# Patient Record
Sex: FEMALE | Race: BLACK OR AFRICAN AMERICAN | NOT HISPANIC OR LATINO | URBAN - METROPOLITAN AREA
[De-identification: names, ages, dates, MRNs, and addresses within clinical notes are randomized per-mention and may not be internally consistent; named-entity substitution may affect disease eponyms.]

---

## 2020-08-15 ENCOUNTER — EMERGENCY (EMERGENCY)
Facility: HOSPITAL | Age: 40
LOS: 1 days | Discharge: ANOTHER HOSPITAL | End: 2020-08-15
Attending: EMERGENCY MEDICINE | Admitting: EMERGENCY MEDICINE
Payer: MEDICARE

## 2020-08-15 VITALS
SYSTOLIC BLOOD PRESSURE: 113 MMHG | HEART RATE: 87 BPM | RESPIRATION RATE: 18 BRPM | DIASTOLIC BLOOD PRESSURE: 65 MMHG | OXYGEN SATURATION: 97 % | TEMPERATURE: 98 F

## 2020-08-15 DIAGNOSIS — R45.851 SUICIDAL IDEATIONS: ICD-10-CM

## 2020-08-15 DIAGNOSIS — F20.9 SCHIZOPHRENIA, UNSPECIFIED: ICD-10-CM

## 2020-08-15 DIAGNOSIS — Z20.828 CONTACT WITH AND (SUSPECTED) EXPOSURE TO OTHER VIRAL COMMUNICABLE DISEASES: ICD-10-CM

## 2020-08-15 DIAGNOSIS — Z88.2 ALLERGY STATUS TO SULFONAMIDES: ICD-10-CM

## 2020-08-15 LAB
ALBUMIN SERPL ELPH-MCNC: 4.3 G/DL — SIGNIFICANT CHANGE UP (ref 3.3–5)
ALP SERPL-CCNC: 63 U/L — SIGNIFICANT CHANGE UP (ref 40–120)
ALT FLD-CCNC: 12 U/L — SIGNIFICANT CHANGE UP (ref 10–45)
ANION GAP SERPL CALC-SCNC: 10 MMOL/L — SIGNIFICANT CHANGE UP (ref 5–17)
APAP SERPL-MCNC: <5 UG/ML — LOW (ref 10–30)
AST SERPL-CCNC: 15 U/L — SIGNIFICANT CHANGE UP (ref 10–40)
BASOPHILS # BLD AUTO: 0.02 K/UL — SIGNIFICANT CHANGE UP (ref 0–0.2)
BASOPHILS NFR BLD AUTO: 0.4 % — SIGNIFICANT CHANGE UP (ref 0–2)
BILIRUB SERPL-MCNC: 0.3 MG/DL — SIGNIFICANT CHANGE UP (ref 0.2–1.2)
BUN SERPL-MCNC: 10 MG/DL — SIGNIFICANT CHANGE UP (ref 7–23)
CALCIUM SERPL-MCNC: 9.5 MG/DL — SIGNIFICANT CHANGE UP (ref 8.4–10.5)
CHLORIDE SERPL-SCNC: 106 MMOL/L — SIGNIFICANT CHANGE UP (ref 96–108)
CO2 SERPL-SCNC: 25 MMOL/L — SIGNIFICANT CHANGE UP (ref 22–31)
CREAT SERPL-MCNC: 0.9 MG/DL — SIGNIFICANT CHANGE UP (ref 0.5–1.3)
EOSINOPHIL # BLD AUTO: 0.24 K/UL — SIGNIFICANT CHANGE UP (ref 0–0.5)
EOSINOPHIL NFR BLD AUTO: 4.3 % — SIGNIFICANT CHANGE UP (ref 0–6)
ETHANOL SERPL-MCNC: 79 MG/DL — HIGH (ref 0–10)
GLUCOSE SERPL-MCNC: 80 MG/DL — SIGNIFICANT CHANGE UP (ref 70–99)
HCT VFR BLD CALC: 34 % — LOW (ref 34.5–45)
HGB BLD-MCNC: 11.1 G/DL — LOW (ref 11.5–15.5)
IMM GRANULOCYTES NFR BLD AUTO: 0.2 % — SIGNIFICANT CHANGE UP (ref 0–1.5)
LYMPHOCYTES # BLD AUTO: 2.59 K/UL — SIGNIFICANT CHANGE UP (ref 1–3.3)
LYMPHOCYTES # BLD AUTO: 46.3 % — HIGH (ref 13–44)
MCHC RBC-ENTMCNC: 29.8 PG — SIGNIFICANT CHANGE UP (ref 27–34)
MCHC RBC-ENTMCNC: 32.6 GM/DL — SIGNIFICANT CHANGE UP (ref 32–36)
MCV RBC AUTO: 91.2 FL — SIGNIFICANT CHANGE UP (ref 80–100)
MONOCYTES # BLD AUTO: 0.46 K/UL — SIGNIFICANT CHANGE UP (ref 0–0.9)
MONOCYTES NFR BLD AUTO: 8.2 % — SIGNIFICANT CHANGE UP (ref 2–14)
NEUTROPHILS # BLD AUTO: 2.27 K/UL — SIGNIFICANT CHANGE UP (ref 1.8–7.4)
NEUTROPHILS NFR BLD AUTO: 40.6 % — LOW (ref 43–77)
NRBC # BLD: 0 /100 WBCS — SIGNIFICANT CHANGE UP (ref 0–0)
PLATELET # BLD AUTO: 254 K/UL — SIGNIFICANT CHANGE UP (ref 150–400)
POTASSIUM SERPL-MCNC: 3.6 MMOL/L — SIGNIFICANT CHANGE UP (ref 3.5–5.3)
POTASSIUM SERPL-SCNC: 3.6 MMOL/L — SIGNIFICANT CHANGE UP (ref 3.5–5.3)
PROT SERPL-MCNC: 7.4 G/DL — SIGNIFICANT CHANGE UP (ref 6–8.3)
RBC # BLD: 3.73 M/UL — LOW (ref 3.8–5.2)
RBC # FLD: 14.1 % — SIGNIFICANT CHANGE UP (ref 10.3–14.5)
SALICYLATES SERPL-MCNC: <0.3 MG/DL — LOW (ref 2.8–20)
SODIUM SERPL-SCNC: 141 MMOL/L — SIGNIFICANT CHANGE UP (ref 135–145)
WBC # BLD: 5.59 K/UL — SIGNIFICANT CHANGE UP (ref 3.8–10.5)
WBC # FLD AUTO: 5.59 K/UL — SIGNIFICANT CHANGE UP (ref 3.8–10.5)

## 2020-08-15 PROCEDURE — 85025 COMPLETE CBC W/AUTO DIFF WBC: CPT

## 2020-08-15 PROCEDURE — 93010 ELECTROCARDIOGRAM REPORT: CPT

## 2020-08-15 PROCEDURE — 80307 DRUG TEST PRSMV CHEM ANLYZR: CPT

## 2020-08-15 PROCEDURE — 93005 ELECTROCARDIOGRAM TRACING: CPT

## 2020-08-15 PROCEDURE — 99285 EMERGENCY DEPT VISIT HI MDM: CPT | Mod: 25

## 2020-08-15 PROCEDURE — 81003 URINALYSIS AUTO W/O SCOPE: CPT

## 2020-08-15 PROCEDURE — 87635 SARS-COV-2 COVID-19 AMP PRB: CPT

## 2020-08-15 PROCEDURE — 99285 EMERGENCY DEPT VISIT HI MDM: CPT

## 2020-08-15 PROCEDURE — 81025 URINE PREGNANCY TEST: CPT

## 2020-08-15 PROCEDURE — 36415 COLL VENOUS BLD VENIPUNCTURE: CPT

## 2020-08-15 PROCEDURE — 80053 COMPREHEN METABOLIC PANEL: CPT

## 2020-08-15 NOTE — ED PROVIDER NOTE - PROGRESS NOTE DETAILS
Director - pt received from Dr Suazo.  PARAM Walton continuing to follow.  Pt sleeping comfortably.  VS, labs reviewed.  Pt medically clear.  Pt eval by psycho who want to reassess in am.  Plan for signout to am team pending repeat psych eval. Director - Pt signed out to Dr Macias and PARAM Watts. Ree: pt received from night team at s/o; pt p/w depression/ suicidality, seen by telepsych and to be re-assessed by day time. Dispo pending. Will continue on 1:1 hold. MIGUEL: pt is awake alert and oriented, states that she is sad, and asking for help. no other current complaints. spoke with psychiatry, who states they are obtaining collateral information and will come to see patient in ed. disposition pending MIGUEL: pt is awake alert and oriented, states that she is sad, and asking for help. no other current complaints. spoke with psychiatry, who states they are obtaining collateral information and will come to see patient in ed. ua obtained, no infection. tox pending. disposition pending psych re-evaluation and reccs plan for voluntary admission. social work as well as case management involved. bed search began. disposition pending spoke with case management. bed search continued-- awaiting placement

## 2020-08-15 NOTE — ED PROVIDER NOTE - ATTENDING CONTRIBUTION TO CARE
The pt is a 39 y/o F, hx of schizophrenia, who presents to ED c/o feeling very depressed and SI over past mon. Pt states that has not been taking any psych meds. States "i'm just so sad", +SI - refusing to disclose plan, states that had 2 glasses wine tonight. Denies cp, sob, n/v/d, abd pain, HI, AH, VH    Pt comfortable not agitated   No focal physical findings  ETOH 79  COVID Neg    Telepsych eval pt and states they would like to reevaluate patient in morning  Signed out to Dr. Morris pending dispo and reevalution

## 2020-08-15 NOTE — ED PROVIDER NOTE - CLINICAL SUMMARY MEDICAL DECISION MAKING FREE TEXT BOX
hx of schizophrenia, not compliant w/meds, c/o worsening depression and si, medically cleared, psych consulted but state that needs an overnight hold and to be reassessed in am

## 2020-08-15 NOTE — ED PROVIDER NOTE - OBJECTIVE STATEMENT
The pt is a 41 y/o F, hx of schizophrenia, who presents to ED c/o feeling very depressed and SI over past mon. The pt is a 41 y/o F, hx of schizophrenia, who presents to ED c/o feeling very depressed and SI over past mon. Pt states that has not been taking any psych meds. States "i'm just so sad", +SI - refusing to disclose plan, states that had 2 glasses wine tonight. Denies cp, sob, n/v/d, abd pain, HI, AH, VH

## 2020-08-15 NOTE — ED ADULT NURSE NOTE - ORIENTED TO SITUATION
[place] INPATIENT ENCOUNTER  CRITICAL CARE DAILY PROGRESS NOTE    ADMISSION DATE:  12/18/2018  DATE:  12/28/2018  CURRENT HOSPITAL DAY:  Hospital Day: 11  ATTENDING PHYSICIAN:  Anthony Juárez MD  CODE STATUS:  No Resuscitation with Maximal Medical Support      IMPRESSION:     Acute on chronic hypoxic and hypercapnic respiratory failure  Altered mental status / possible narcotic induced  Acute on chronic renal failure  Hypotension, post intubation  decompensated diastolic heart failure / small pericardial effusion  Atrial fibrillation  Morbid obesity / Obesity hypoventilation syndrome           PLAN:     Continue vent support  Patient gets apneic on CPAP trials  Keep sedation off  dialysis  Repeat labs   Rate control  Blood sugar control      Patient remain critically ill    35 minutes of critical care time spent        SUBJECTIVE:  No events over night     MEDICATIONS:  SCHEDULED MEDS:  • piperacillin-tazobactam (ZOSYN) extended interval IVPB  3.375 g Intravenous 2 times per day   • chlorhexidine gluconate  15 mL Swish & Spit 2 times per day   • petrolatum(white)/mineral oil/NaCL  1 application Both Eyes 6 times per day   • pantoprazole  40 mg Intravenous Nightly   • epoetin sweetie  20,000 Units Subcutaneous Once per day on Wed   • ferrous sulfate  325 mg Oral Daily with breakfast   • insulin glargine  25 Units Subcutaneous 2 times per day   • pravastatin  80 mg Oral Nightly   • sodium chloride (PF)  2 mL Injection 2 times per day   • insulin lispro   Subcutaneous TID AC       CONTINUOUS INFUSIONS:  • propofol (DIPRIVAN) infusion 10 mcg/kg/min (12/28/18 0517)   • fentaNYL 1000 mcg in sodium chloride 0.9% 100 mL infusion premix 25 mcg/hr (12/28/18 0522)   • norepinephrine (LEVOPHED) 4 mg in sodium chloride 0.9% 250 mL infusion Stopped (12/27/18 0047)   • dextrose 5 % infusion         PRN MEDS:  sodium chloride, albumin human (SPA), sodium chloride, albumin human (SPA), naLOXone, chlorhexidine gluconate **AND**  chlorhexidine gluconate, fentaNYL, fentaNYL, propofol (DIPRIVAN) infusion **AND** Triglyceride, fentaNYL (SUBLIMAZE) standard infusion **AND** fentaNYL, ondansetron, metoCLOPramide, aluminum-magnesium hydroxide-simethicone, albuterol, sodium chloride, sodium chloride, magnesium sulfate, magnesium sulfate, magnesium sulfate, potassium chloride, potassium chloride, potassium chloride 20 mEq/100mL IVPB, potassium chloride, potassium chloride, potassium chloride 20 mEq/100mL IVPB, acetaminophen, polyethylene glycol, labetalol, dextrose, dextrose, glucagon, dextrose, sodium chloride (PF), guaiFENesin-DM)    HISTORIES:  I have reviewed the past medical history, family history, social history, medications and allergies listed in the medical record as well as the nursing notes for this encounter.    OBJECTIVE:  VITAL SIGNS:  Vital Last Value 24 Hour Range   Temperature 97.7 °F (36.5 °C) (12/28/18 0800) Temp  Min: 97.3 °F (36.3 °C)  Max: 98.9 °F (37.2 °C)   Pulse 121 (12/28/18 1000) Pulse  Min: 96  Max: 122   Respiratory 24 (12/27/18 1630) Resp  Min: 12  Max: 24   Non-Invasive  Blood Pressure 136/71 (12/28/18 1000) BP  Min: 88/62  Max: 156/86   Pulse Oximetry 99 % (12/28/18 1000) SpO2  Min: 96 %  Max: 100 %     Vital Today Admitted   Weight (!) 181.4 kg (12/27/18 1629) Weight: (!) 176.4 kg (12/18/18 1136)   Height N/A Height: 5' 7\" (170.2 cm) (12/18/18 1136)   BMI N/A BMI (Calculated): 60.91 (12/18/18 1136)     INTAKE/OUTPUT LAST 3 SHIFTS:  I/O last 3 completed shifts:  In: 687.3 [I.V.:581.7; IV Piggyback:105.6]  Out: 2650 [Urine:800; Drains:150; Other:1700]    INTAKE/OUTPUT THIS SHIFT:  No intake/output data recorded.    VENT SETTINGS LAST 24 HOURS:  FiO2 (%):  [30 %-35 %] 30 %  S RR:  [24] 24  S VT:  [480 mL] 480 mL  PEEP:  [5 cm H20] 5 cm H20    HEMODYNAMIC SETTINGS LAST 24 HOURS:       PHYSICAL EXAM:  General:  Lying in bed not in acute distress  Skin:  Skin color, texture, and turgor normal.  No rashes or  lesions.  Head:  Normocephalic, without obvious abnormality, atraumatic.  Eyes:  PERRL, conjunctivae/corneas clear.  Nose: Nares normal. Septum midline.  Throat:  Lips, mucosa, and tongue normal; teeth and gums normal.  Neck: Supple, symmetrical.  Trachea midline. No adenopathy.  Lungs:  Diminished,ed no crackles, rhonchi or wheezing   Heart:: IRRegular rate and rhythm. S1 and S2 normal. No murmur, rub or gallop.  Abdomen:  Soft, nontender.  Bowel sounds active in all four quadrants. No masses hard to assess for hepatosplenomegaly  Extremities: venous stasis changes, chronic lymphedema   Neurologic:  Sedated doesn't follow commands     LABORATORY DATA:  Recent Labs   Lab 12/28/18  0502 12/27/18  0438 12/26/18  1530   WBC 11.0 9.6 11.3*   HCT 31.1* 30.4* 36.6   HGB 9.0* 8.8* 9.8*   * 126* 142     Recent Labs   Lab 12/28/18  0502 12/27/18  0438 12/26/18  1530   SODIUM 136 136 136   POTASSIUM 3.9 3.8 4.8   CHLORIDE 100 99 99   CO2 29 28 29   ANIONGAP 11 13 13   GLUCOSE 125* 164* 218*   BUN 77* 98* 124*   CREATININE 3.03* 3.37* 4.23*   GFRA 18 16 12   GFRNA 16 14 10   BCRAT 25 29* 29*   CALCIUM 8.1* 7.9* 7.3*       IMAGING STUDIES:  Results for orders placed during the hospital encounter of 12/18/18   XR CHEST AP OR PA    Narrative PROCEDURE: CHEST, 1 VIEW, FRONTAL     COMPARISONS: Study or today    CLINICAL INDICATION: line placement    FINDINGS: Patient is intubated with endotracheal tube tip positioned 4 cm  above the cheryl. Nasogastric tube is directed into the stomach.  Right-sided dialysis catheter remains in place. There is been interval  placement of a left internal jugular central venous catheter with tube tip  in the right atrium. No pneumothorax.    Lung base opacity and right greater than left pleural effusions persist.  Stable heart size.           Impression IMPRESSION: Tubes in expected position. Persistent pleural effusions and  lung base opacity.             Thank you for letting me see Ms.  Galindo.      Jeyson Edwards M.D, [facility],      Yes

## 2020-08-16 ENCOUNTER — INPATIENT (INPATIENT)
Facility: HOSPITAL | Age: 40
LOS: 4 days | Discharge: HOME | End: 2020-08-21
Attending: PSYCHIATRY & NEUROLOGY | Admitting: PSYCHIATRY & NEUROLOGY
Payer: MEDICARE

## 2020-08-16 VITALS
HEART RATE: 66 BPM | DIASTOLIC BLOOD PRESSURE: 72 MMHG | WEIGHT: 128.97 LBS | SYSTOLIC BLOOD PRESSURE: 113 MMHG | TEMPERATURE: 97 F | HEIGHT: 68 IN | RESPIRATION RATE: 18 BRPM

## 2020-08-16 VITALS
TEMPERATURE: 99 F | HEART RATE: 61 BPM | DIASTOLIC BLOOD PRESSURE: 72 MMHG | RESPIRATION RATE: 18 BRPM | SYSTOLIC BLOOD PRESSURE: 131 MMHG | OXYGEN SATURATION: 100 %

## 2020-08-16 DIAGNOSIS — F32.9 MAJOR DEPRESSIVE DISORDER, SINGLE EPISODE, UNSPECIFIED: ICD-10-CM

## 2020-08-16 DIAGNOSIS — F20.9 SCHIZOPHRENIA, UNSPECIFIED: ICD-10-CM

## 2020-08-16 LAB
APPEARANCE UR: CLEAR — SIGNIFICANT CHANGE UP
BILIRUB UR-MCNC: NEGATIVE — SIGNIFICANT CHANGE UP
COLOR SPEC: YELLOW — SIGNIFICANT CHANGE UP
DIFF PNL FLD: NEGATIVE — SIGNIFICANT CHANGE UP
GLUCOSE UR QL: NEGATIVE — SIGNIFICANT CHANGE UP
HCG UR QL: NEGATIVE — SIGNIFICANT CHANGE UP
KETONES UR-MCNC: NEGATIVE — SIGNIFICANT CHANGE UP
LEUKOCYTE ESTERASE UR-ACNC: NEGATIVE — SIGNIFICANT CHANGE UP
NITRITE UR-MCNC: NEGATIVE — SIGNIFICANT CHANGE UP
PCP SPEC-MCNC: SIGNIFICANT CHANGE UP
PH UR: 6 — SIGNIFICANT CHANGE UP (ref 5–8)
PROT UR-MCNC: NEGATIVE MG/DL — SIGNIFICANT CHANGE UP
SARS-COV-2 RNA SPEC QL NAA+PROBE: SIGNIFICANT CHANGE UP
SP GR SPEC: 1.02 — SIGNIFICANT CHANGE UP (ref 1–1.03)
UROBILINOGEN FLD QL: 1 E.U./DL — SIGNIFICANT CHANGE UP

## 2020-08-16 PROCEDURE — 90792 PSYCH DIAG EVAL W/MED SRVCS: CPT | Mod: 95

## 2020-08-16 RX ORDER — HYDROXYZINE HCL 10 MG
25 TABLET ORAL EVERY 6 HOURS
Refills: 0 | Status: DISCONTINUED | OUTPATIENT
Start: 2020-08-16 | End: 2020-08-17

## 2020-08-16 NOTE — ED BEHAVIORAL HEALTH ASSESSMENT NOTE - OTHER PAST PSYCHIATRIC HISTORY (INCLUDE DETAILS REGARDING ONSET, COURSE OF ILLNESS, INPATIENT/OUTPATIENT TREATMENT)
per chart review pt has had prior psychiatric hospitalizations including to [facility], though details are unclear

## 2020-08-16 NOTE — ED BEHAVIORAL HEALTH ASSESSMENT NOTE - DESCRIPTION
Per ED staff pt has been quiet, requesting food, and sleeping, not requiring any PRN medication    Vital Signs Last 24 Hrs  T(C): 36.8 (16 Aug 2020 00:01), Max: 36.8 (16 Aug 2020 00:01)  T(F): 98.3 (16 Aug 2020 00:01), Max: 98.3 (16 Aug 2020 00:01)  HR: 63 (16 Aug 2020 00:01) (63 - 87)  BP: 124/81 (16 Aug 2020 00:01) (113/65 - 124/81)  BP(mean): --  RR: 18 (16 Aug 2020 00:01) (18 - 18)  SpO2: 98% (16 Aug 2020 00:01) (97% - 98%) denies reports living alone in an apartment

## 2020-08-16 NOTE — PATIENT PROFILE BEHAVIORAL HEALTH - NSBHINSIGHT_PSY_A_CORE
Notification Instructions: Patient will be notified of biopsy results. However, patient instructed to call the office if not contacted within 2 weeks. Detail Level: Detailed Bill 68251 For Specimen Handling/Conveyance To Laboratory?: no Size Of Lesion In Cm (Required): 1.3 Hemostasis: Drysol Path Notes (To The Dermatopathologist): Please check margins. Consent was obtained from the patient. The risks and benefits to therapy were discussed in detail. Specifically, the risks of infection, scarring, bleeding, prolonged wound healing, incomplete removal, allergy to anesthesia, nerve injury and recurrence were addressed. Prior to the procedure, the treatment site was clearly identified and confirmed by the patient. All components of Universal Protocol/PAUSE Rule completed. Billing Type: Third-Party Bill X Size Of Lesion In Cm (Optional): 0 Anesthesia Volume In Cc: 0.5 Lab: 6 Medical Necessity Information: It is in your best interest to select a reason for this procedure from the list below. All of these items fulfill various CMS LCD requirements except the new and changing color options. Was A Bandage Applied: Yes Wound Care: Petrolatum Anesthesia Type: 1% lidocaine with epinephrine Lab Facility: 3 Biopsy Method: Dermablade Post-Care Instructions: I reviewed with the patient in detail post-care instructions. Patient is to keep the biopsy site dry overnight, and then apply bacitracin twice daily until healed. Patient may apply hydrogen peroxide soaks to remove any crusting. Medical Necessity Clause: This procedure was medically necessary because the lesion that was treated was: fair

## 2020-08-16 NOTE — H&P ADULT - NSHPLABSRESULTS_GEN_ALL_CORE
11.1   5.59  )-----------( 254      ( 15 Aug 2020 22:22 )             34.0       08-15    141  |  106  |  10  ----------------------------<  80  3.6   |  25  |  0.90    Ca    9.5      15 Aug 2020 22:22    TPro  7.4  /  Alb  4.3  /  TBili  0.3  /  DBili  x   /  AST  15  /  ALT  12  /  AlkPhos  63  08-15              Urinalysis Basic - ( 16 Aug 2020 10:09 )    Color: Yellow / Appearance: Clear / S.025 / pH: x  Gluc: x / Ketone: NEGATIVE  / Bili: Negative / Urobili: 1.0 E.U./dL   Blood: x / Protein: NEGATIVE mg/dL / Nitrite: NEGATIVE   Leuk Esterase: NEGATIVE / RBC: x / WBC x   Sq Epi: x / Non Sq Epi: x / Bacteria: x            Lactate Trend            CAPILLARY BLOOD GLUCOSE

## 2020-08-16 NOTE — H&P ADULT - REASON FOR ADMISSION
40 YEARS OLD FEMALE TRANSFERRED FROM [facility] TO ADMIT TO [facility] UN[facility] FOR SUICIDAL THOUGHTS .

## 2020-08-16 NOTE — ED BEHAVIORAL HEALTH ASSESSMENT NOTE - THOUGHT CONTENT
Reason for Disposition   Passing pure blood or large blood clots (i e , size > a dime) (Exception: marco or small strands)    Answer Assessment - Initial Assessment Questions  1  COLOR of URINE: "Describe the color of the urine "  (e g , tea-colored, pink, red, blood clots, bloody)      Patient has urinated pure colored blood twice since yesterday night  2  ONSET: "When did the bleeding start?"       Noticed last night at around 2100 and this morning at 1130     3  EPISODES: "How many times has there been blood in the urine?" or "How many times today?"      Twice so far since last night  4  PAIN with URINATION: "Is there any pain with passing your urine?" If so, ask: "How bad is the pain?"  (Scale 1-10; or mild, moderate, severe)         Denies pain  5  FEVER: "Do you have a fever?" If so, ask: "What is your temperature, how was it measured, and when did it start?"      97 4 (oral)     6  ASSOCIATED SYMPTOMS: "Are you passing urine more frequently than usual?"     Denies frequency  Denies flank pain  No fever  7  OTHER SYMPTOMS: "Do you have any other symptoms?" (e g , back/flank pain, abdominal pain, vomiting)     No other symptoms  Is not on any blood thinners      Protocols used: URINE - BLOOD IN-ADULT- Other

## 2020-08-16 NOTE — ED BEHAVIORAL HEALTH ASSESSMENT NOTE - HPI (INCLUDE ILLNESS QUALITY, SEVERITY, DURATION, TIMING, CONTEXT, MODIFYING FACTORS, ASSOCIATED SIGNS AND SYMPTOMS)
Pt is a 41 y/o AAF, domiciled, reported psychiatric hx of schizophrenia, substance misuse, no significant pmh, prior psychiatric hospitalizations including state hospitalization (per chart review), unclear hx of prior suicidality, non-adherent to outpatient care and medication, presenting self to ED with suicidal ideations.    Per ED staff pt was vague but reported SI without reporting plan. She was cooperative with staff and requesting food. She had reported drinking 2 glasses of wine and had a BAL of 79 upon arrival, UDS pending.    Upon evaluation, pt was minimally cooperative and was falling asleep throughout interview, became more irritable when aroused or woken up. She would only say that she took the train to the hospital because she was feeling 'sad' and 'needs to be in psych'. She continued to fall asleep and when woken up would only repeat those statements, not answering questions about suicidality or plan, any current or active stressors, compliance with treatment or medication, or any psychotic sxs. She may be under the influence of drugs and or alcohol at this time contributing to her presentation, but would not address these questions.  Pt had no available collaterals, and most of her exam at this point is limited due to her lack of ability to cooperate at this time, requiring further assessment.

## 2020-08-16 NOTE — ED BEHAVIORAL HEALTH NOTE - BEHAVIORAL HEALTH NOTE
===================  PRE-HOSPITAL COURSE  ===================  SOURCE:  ED triage note and ED RN  DETAILS:  Patient walked into the ED stating she was feeling sad, depressed and suicidal    ============  ED COURSE   ============  SOURCE:  ED Triage note and ED RN   ARRIVAL:  Walk in  BELONGINGS:  All belongings were searched and secured.   BEHAVIOR: Per ED RN patient was cooperative during the triage process and has been calm and cooperative during her stay in the ED. Per ED RN patient has been quiet and sleeping. When patient was awake she told the RN she was depressed and had thoughts of hurting herself by overdosing on pills. Per ED RN patients speech was low, no eye contact, no a/v/h, does not look disheveled.   TREATMENT:  She did not receive any medications   VISITORS:  None    ========================  FOR EACH COLLATERAL  ========================  NAME: Paula  NUMBER: [phone]  RELATIONSHIP: Mother  COMMENTS: [facility] left message for mother on 8/15/2020 at 23:53        COVID Exposure Screen- collateral (i.e. third-party, chart review, belongings, etc; include EMS and ED staff)    1.            *In the past 14 days, has the patient been around anyone with a positive COVID-19 test?*   (  ) Yes   (  ) No   (  x) Unknown- Reason (e.g. collateral uncertain, refusing to answer, etc.):  ______  IF YES PROCEED TO QUESTION #2. IF NO or UNKNOWN, PLEASE SKIP TO QUESTION #7  2.            Was the patient within 6 feet of them for at least 15 minutes? (  ) Yes   (  ) No   (  ) Unknown- Reason: ______    3.            Did the patient provide care for them? (  ) Yes   (  ) No   (  ) Unknown- Reason: ______    4.            Did the patient have direct physical contact with them (touched, hugged, or kissed them)? (  ) Yes   (  ) No    (  ) Unknown- Reason: ______    5.            Did the patient share eating or drinking utensils with them? (  ) Yes   (  ) No    (  ) Unknown- Reason: ______    6.            Have they sneezed, coughed, or somehow got respiratory droplets on the patient? (  ) Yes   (  ) No    (  ) Unknown- Reason: ______    7.            *Has the patient been out of [place] within the past 14 days?*  (  ) Yes   (  ) No   ( x ) Unknown- Reason (e.g. collateral uncertain, refusing to answer, etc.): _______  IF YES PLEASE ANSWER THE FOLLOWING QUESTIONS:  8.            Which state/country has the patient been to? ______   9.            Was the patient there over 24 hours? (  ) Yes   (  ) No    (  ) Unknown- Reason: ______    10.          What date did the patient return to [facility]? ______

## 2020-08-16 NOTE — H&P ADULT - HISTORY OF PRESENT ILLNESS
41 y/o F, hx of schizophrenia, who presents to ED c/o feeling very depressed and SI over past mon. Pt states that has not been taking any psych meds. States "i'm just so sad", +SI - refusing to disclose plan, states that had 2 glasses wine tonight. Denies cp, sob, n/v/d, abd pain, HI, AH, VH

## 2020-08-16 NOTE — ED BEHAVIORAL HEALTH NOTE - BEHAVIORAL HEALTH NOTE
Patient ID  Patient is a 39 yo woman, domiciled with mother in apartment, unemployed, PPH of schizoaffective disorder, polysubstance use (alcohol, crack cocaine), multiple prior hospitalizations, most recently discharged from [facility] on 7/31/20 after admission for low mood, reported history of Invega IM and AOT, previous suicide attempts, with remote PMH of seizure disorder per chart, who walked in reporting sadness.     The patient was uncooperative with initial assessment overnight, and continued to fall asleep during interview. BAL was 79 on arrival.        Interval Events/HPI  Upon evaluation today, the patient remains sleepy but overall cooperative with questions. She demonstrates an odd affect, smiling and laughing inappropriately. Her thought process however is overall linear and there appears to be no evidence of distraction or internal preoccupation. She is guarded and not forthcoming throughout the interview. She says “I feel sad” and reports vague SI with plan to overdose “on pills”. She denies HI, AH, paranoid thoughts. She says she is usually on Invega but “not in a while”. She denies any  recent substance use, including alcohol. She does not provide any spontaneous information, and cannot allude to any current psychosocial stressors causing her low mood. She requests admission to “get back on medications” and is vague about her plan to follow up as an outpatient after. She declined to provide any collateral informants.     Collateral from [facility] system shows a recent admission per chart at [facility] for 10 days, discharged 7/31/20. The patient had presented similarly to the [facility] ER for reported “sadness”, and was found to be inappropriately smiling, guarded, and minimizing of her symptoms. She was admitted and re-started on Risperdal. The team was unable to reach her AOT/outpatient team or gather collateral regarding her Invega IM. The patient was safe for discharge 10 days later with a plan to follow up at [facility] outpatient clinic.        Mental Status Exam:  Patient appropriately groomed and dressed lying in stretcher, sleepy but calm, cooperative, speech within normal limits, no psychomotor abnormalities, mood “sad”, affect constricted with occasional inappropriate smiling and laughter, thought process concrete, linear, not overtly disorganized, thought content was non-spontaneous, endorsed SI with plan to OD, denied HI, AH, paranoid thoughts, did not appear internally preoccupied, cognition grossly intact, insight limited, judgement fair, impulse control appropriate to setting at this time.       Assessment:  Patient is a 39 yo woman, domiciled with mother in apartment, unemployed, PPH of schizoaffective disorder, polysubstance use (alcohol, crack cocaine), multiple prior hospitalizations, most recently discharged from [facility] on 7/31/20 after admission for low mood, reported history of Invega IM and AOT, previous suicide attempts, with remote PMH of seizure disorder per chart, who walked in reporting sadness.     Patient presented with no overt signs of faraz, depression, or psychosis. Thought process was overall linear albeit concrete and without spontaneous offering of information. She demonstrated an odd affect, smiling and laughing inappropriately when discussing suicide. She appeared guarded and minimizing any symptoms, though did constantly mention her “sadness”. Although she did not demonstrate other signs of psychosis, the patient has a history per chart collateral of requiring admission for what may be negative symptoms of schizophrenia vs schizoaffective disorder depressive type. There is likely a substance use component to her presentation (alcohol at least), with unclear implications for a possible substance-induced mood disorder. Patient was requesting admission for restarting medications and she subsequently signed 9.13 voluntary paperwork.      It is unclear whether patient is still on Invega IM or AOT; admission will be helpful for further diagnostic clarification and outpatient service bolstering. The patient at this time does not appear to be interested in any deliberate secondary gain.        Impression:  Schizophrenia vs schizoaffective disorder depressive type  Rule out alcohol use disorder  Rule out substance-induced mood disorder       Risk Assessment:  Suicide  Chronic risk factors include history of psychiatric illness, history of mood symptoms, history of substance use, prior suicide attempts. Acute factors include recent substance use, current mood symptoms, current endorsement of SI. Mitigating factors include patient’s vagueness without intent to die, help-seeking behavior, and stable housing with mother (unconfirmed). Overall, patient has a moderate chronic risk but low acute risk of suicide.     Violence  Patient is calm, cooperative, not endorsing any violent thoughts or HI at this time. There is no clear history of violence per chart. Patient is a low violence risk at this time.       Plan  --Admit 9.13/voluntary; paperwork signed; searching for available beds  --Continue 1:1 for suicidality and elopement precautions  --Follow up urine toxicology  --Hold medications for now due to sleepiness; would consider restarting Risperdal in near future  --Vitals q4hr; assess for any alcohol withdrawal symptoms

## 2020-08-16 NOTE — H&P ADULT - NSHPPHYSICALEXAM_GEN_ALL_CORE
Vital Signs Last 24 Hrs  T(C): 36.3 (08-16-20 @ 19:55)  T(F): 97.4 (08-16-20 @ 19:55), Max: 98.8 (08-16-20 @ 17:35)  HR: 66 (08-16-20 @ 19:55) (54 - 87)  BP: 113/72 (08-16-20 @ 19:55)  BP(mean): --  RR: 18 (08-16-20 @ 19:55) (16 - 18)  SpO2: 100% (08-16-20 @ 17:35) (97% - 100%)  Wt(kg): --

## 2020-08-16 NOTE — ED BEHAVIORAL HEALTH ASSESSMENT NOTE - RISK ASSESSMENT
unable to fully determine at this time due to patients uncooperative presentation Unable to determine Suicide Risk

## 2020-08-16 NOTE — ED BEHAVIORAL HEALTH ASSESSMENT NOTE - SUMMARY
Pt is a 41 y/o AAF, domiciled, reported psychiatric hx of schizophrenia, substance misuse, no significant pmh, prior psychiatric hospitalizations including state hospitalization (per chart review), unclear hx of prior suicidality, non-adherent to outpatient care and medication, presenting self to ED with suicidal ideations.  Pt was largely uncooperative during interview and repeatedly falling asleep after saying she is feeling sad and 'needs psych', refusing to elaborate and unable to stay awake. Pt does carry a significant psychiatric hx but it is unclear if she has bene compliant with outpatient care or follow up contributing to her current presentation.  She requires further assessment once better able to tolerate interview.

## 2020-08-17 DIAGNOSIS — F17.200 NICOTINE DEPENDENCE, UNSPECIFIED, UNCOMPLICATED: ICD-10-CM

## 2020-08-17 DIAGNOSIS — F14.10 COCAINE ABUSE, UNCOMPLICATED: ICD-10-CM

## 2020-08-17 DIAGNOSIS — F25.9 SCHIZOAFFECTIVE DISORDER, UNSPECIFIED: ICD-10-CM

## 2020-08-17 DIAGNOSIS — F10.10 ALCOHOL ABUSE, UNCOMPLICATED: ICD-10-CM

## 2020-08-17 LAB
SARS-COV-2 IGG SERPL QL IA: NEGATIVE — SIGNIFICANT CHANGE UP
SARS-COV-2 IGM SERPL IA-ACNC: 0.12 INDEX — SIGNIFICANT CHANGE UP

## 2020-08-17 PROCEDURE — 99221 1ST HOSP IP/OBS SF/LOW 40: CPT

## 2020-08-17 PROCEDURE — 99232 SBSQ HOSP IP/OBS MODERATE 35: CPT

## 2020-08-17 RX ORDER — RISPERIDONE 4 MG/1
1 TABLET ORAL EVERY 8 HOURS
Refills: 0 | Status: DISCONTINUED | OUTPATIENT
Start: 2020-08-17 | End: 2020-08-18

## 2020-08-17 RX ORDER — HYDROXYZINE HCL 10 MG
25 TABLET ORAL EVERY 6 HOURS
Refills: 0 | Status: DISCONTINUED | OUTPATIENT
Start: 2020-08-17 | End: 2020-08-18

## 2020-08-17 RX ORDER — HYDROXYZINE HCL 10 MG
50 TABLET ORAL ONCE
Refills: 0 | Status: COMPLETED | OUTPATIENT
Start: 2020-08-17 | End: 2020-08-17

## 2020-08-17 RX ORDER — NICOTINE POLACRILEX 2 MG
2 GUM BUCCAL
Refills: 0 | Status: DISCONTINUED | OUTPATIENT
Start: 2020-08-17 | End: 2020-08-21

## 2020-08-17 RX ADMIN — RISPERIDONE 1 MILLIGRAM(S): 4 TABLET ORAL at 18:41

## 2020-08-17 RX ADMIN — Medication 2 MILLIGRAM(S): at 17:21

## 2020-08-17 RX ADMIN — Medication 25 MILLIGRAM(S): at 17:27

## 2020-08-17 RX ADMIN — Medication 50 MILLIGRAM(S): at 19:58

## 2020-08-17 RX ADMIN — Medication 25 MILLIGRAM(S): at 20:15

## 2020-08-17 NOTE — PROGRESS NOTE BEHAVIORAL HEALTH - SUMMARY
41 y/o woman, domiciled with mother in apartment?, unemployed, PPH: schizoaffective disorder, polysubstance use (alcohol, crack cocaine), multiple prior hospitalizations, most recently discharged from [facility] on 7/31/20 after admission for low mood, reported history of Invega IM and AOT, previous suicide attempts, with remote PMH of seizure disorder per chart, BIB self to [facility] ED reporting sadness. On evaluation, pt presents with odd affect and inappropriate smiling/laughing while discussing past SA and mood history, becoming increasingly guarded and evasive on hx of psychosis. Of note, on admission BAL 79 and UDS positive cocaine. Her presentation seems consistent with SIMD at this time, however d/t pt's hx of schizoaffective disorder and inability to confirm with collaterals medication history, decompensation in context of medication nonadherence remains on differential.     #Schizoaffective d/o; R/O SIMD  -consider starting risperdal 1 mg bid  -pt states received Invega NAGY prior to discharge from [facility], unable to verify  -c/w hydroxyzine 50 mg q6h prn for anxiety or/and insomnia    #Polysubstance abuse (cocaine, ETOH, tobacco)  -pt denies withdrawal sx  -monitor for s/sx of withdrawal  -encourage rehab 41 y/o woman, domiciled with mother in apartment?, unemployed, PPH: schizoaffective disorder, polysubstance use (alcohol, crack cocaine), multiple prior hospitalizations, most recently discharged from [facility] on 7/31/20 after admission for low mood, reported history of Invega IM and AOT, previous suicide attempts, with remote PMH of seizure disorder per chart, BIB self to [facility] ED reporting sadness. On evaluation, pt presents with odd affect and inappropriate smiling/laughing while discussing past SA and mood history, becoming increasingly guarded and evasive on hx of psychosis. Of note, on admission BAL 79 and UDS positive cocaine. Her presentation seems consistent with SIMD at this time, however d/t pt's hx of schizoaffective disorder and inability to confirm with collaterals medication history, decompensation in context of medication nonadherence remains on differential.     #Schizoaffective d/o; R/O SIMD  -consider starting risperdal 1 mg bid  -start risperdal 1 mg q8h prn for agitation  -pt states received Invega NAGY prior to discharge from [facility], unable to verify  -c/w hydroxyzine 25 mg q6h prn for anxiety or/and insomnia    #Polysubstance abuse (cocaine, ETOH, tobacco)  -pt denies withdrawal sx  -monitor for s/sx of withdrawal  -start librium 25 mg q4h prn for objective s/sx of alcohol withdrawal  -addiction consult pending  -encourage rehab 39 y/o woman, domiciled with mother in apartment?, unemployed, PPH: schizoaffective disorder, polysubstance use (alcohol, crack cocaine), multiple prior hospitalizations, most recently discharged from [facility] on 7/31/20 after admission for low mood, reported history of Invega IM and AOT, previous suicide attempts, with remote PMH of seizure disorder per chart, BIB self to [facility] ED reporting sadness. On evaluation, pt presents with odd affect and inappropriate smiling/laughing while discussing past SA and mood history, becoming increasingly guarded and evasive on hx of psychosis. Of note, on admission BAL 79 and UDS positive cocaine, pt appears to be minimizing substance use hx, addiction consult pending. Her presentation seems consistent with SIMD at this time, however d/t pt's hx of schizoaffective disorder and inability to confirm with collaterals on medication history, decompensation in context of medication nonadherence remains on differential.     #Schizoaffective d/o; R/O SIMD  -consider starting risperdal 1 mg bid  -start risperdal 1 mg q8h prn for agitation  -pt states received Invega NAGY prior to discharge from [facility], unable to verify  -c/w hydroxyzine 25 mg q6h prn for anxiety or/and insomnia    #Polysubstance abuse (cocaine, ETOH, tobacco)  -pt denies withdrawal sx  -monitor for s/sx of withdrawal  -start librium 25 mg q4h prn for objective s/sx of alcohol withdrawal  -addiction consult pending  -encourage rehab

## 2020-08-17 NOTE — PROGRESS NOTE BEHAVIORAL HEALTH - OTHER
Message from [employer]:  Gentry Estrada would like a refill of the following medications:     amphetamine-dextroamphetamine (ADDERALL) 20 MG tablet [Amadeo Rios MD]     zolpidem (AMBIEN) 10 MG tablet [Amadeo Rios MD]    Preferred pharmacy: [facility] [id number] [place] [address] [address]  AT [id number]  [address]  Delivery method: Pickup  Preferred pick-up date and time: 8/16/2018 6:00 PM       unable to assess

## 2020-08-17 NOTE — PROGRESS NOTE BEHAVIORAL HEALTH - NSBHFUPADDHPIFT_PSY_A_CORE
As per chart review, HPI obtained by writer in the emergency room.  Interval CC and HPI in designated sections below.     39 yo woman, domiciled with mother in apartment, unemployed, PPH of schizoaffective disorder, polysubstance use (alcohol, crack cocaine), multiple prior hospitalizations, most recently discharged from  on 7/31/20 after admission for low mood, reported history of Invega IM and AOT, previous suicide attempts, with remote PMH of seizure disorder per chart, BIB self to [facility] ED reporting sadness. The patient was uncooperative with initial assessment overnight, and continued to fall asleep during interview. BAL was 79 on arrival.     Upon evaluation today, the patient remains sleepy but overall cooperative with questions. She demonstrates an odd affect, smiling and laughing inappropriately. Her thought process however is overall linear and there appears to be no evidence of distraction or internal preoccupation. She is guarded and not forthcoming throughout the interview. She says “I feel sad” and reports vague SI with plan to overdose “on pills”. She denies HI, AH, paranoid thoughts. She says she is usually on Invega but “not in a while”. She denies any recent substance use, including alcohol. She does not provide any spontaneous information, and cannot allude to any current psychosocial stressors causing her low mood. She requests admission to “get back on medications” and is vague about her plan to follow up as an outpatient after. She declined to provide any collateral informants.     Collateral from [facility] system shows a recent admission per chart at  for 10 days, discharged 7/31/20. The patient had presented similarly to the [facility] ER for reported “sadness”, and was found to be inappropriately smiling, guarded, and minimizing of her symptoms. She was admitted and re-started on Risperdal. The team was unable to reach her AOT/outpatient team or gather collateral regarding her Invega IM. The patient was safe for discharge 10 days later with a plan to follow up at [facility] outpatient clinic.

## 2020-08-17 NOTE — CONSULT NOTE ADULT - ASSESSMENT
39 yo with schizophrenia admitted to [facility] for suicidal ideation and has no known medical history    Continue care per psychiatry team  Smoking cessation counseled will need nicotine patch    Please call medicine for any medical issues that may arise during his admission    Dr Almaraz  [phone]

## 2020-08-17 NOTE — PROGRESS NOTE BEHAVIORAL HEALTH - NSBHFUPINTERVALHXFT_PSY_A_CORE
Pt seen and evaluated, chart reviewed. As per nursing report, pt had no acute events overnight. On evaluation, pt presents sleeping in bed, arousable to verbal stimuli but requesting for this writer to come back later for interview. On reevaluation, pt endorses she continu Pt seen and evaluated, chart reviewed. As per nursing report, pt had no acute events overnight. On evaluation, pt presents sleeping in bed, arousable to verbal stimuli but requesting for this writer to come back later for interview. On reevaluation, pt appears linear and does not appear to be responding to internal stimuli. Pt presents with an odd affect, at times smiling and laughing inappropriately when discussing suicide and then becoming increasingly guarded and evasive when discussing history of psychosis as per chart review. Pt endorses having “sadness”, unable to quantify for how long stating "for awhile", unable to identify trigger, shrugging her shoulders. Pt endorses past medications include Invega and risperdal, however is unable to state dose and when last received. When evaluated on depression, pt states she has been sleeping a lot, denies changes to her appetite, shrugging on evaluation of motivation and concentration. Denies current SI/HI, intent and plan. Pt endorses hx of not sleeping for days and feeling full of energy, states last time "awhile ago". Pt appears to be minimizing substance use, at first denying alcohol and cocaine use, but when told of positive lab results, pt endorses she drinks alcohol "sometimes", unsure quantity; and takes cocaine "now and then", states last use day before admission, unsure of quantity. Pt is unable to differentiate prior hx of manic sx with periods of sobriety. Pt denies current AV/VH, paranoia, delusions; at first denied past hx, but then admits to past hx after chart review, states "it happened before, not anymore... not for a long time", refusing to discuss prior psychosis. Pt became increasing guarded and agitated, refusing to answer further questions, refuses to start standing medications and refuses to provide collateral and permission to speak with her mother, Paula Abbott. Pt seen and evaluated, chart reviewed. As per nursing report, pt had no acute events overnight. On evaluation, pt presents sleeping in bed, arousable to verbal stimuli but requesting for this writer to come back later for interview. On reevaluation, pt appears linear and does not appear to be responding to internal stimuli. Pt presents with an odd affect, at times smiling and laughing inappropriately when discussing suicide and then becoming increasingly guarded and evasive when discussing history of psychosis as per chart review. Pt endorses having “sadness”, unable to quantify for how long stating "for awhile", unable to identify trigger, shrugging her shoulders. Pt endorses past medications include Invega and risperdal, however is unable to state dose and when last received, states she received a shot when last discharged at [facility] "awhile ago... I didn't  medications", denies current OP providers, unable to identify prior providers. When evaluated on depression, pt states she has been sleeping a lot, denies changes to her appetite, shrugging on evaluation of motivation and concentration. Denies current SI/HI, intent and plan. Pt endorses hx of not sleeping for days and feeling full of energy, states last time "awhile ago". Pt appears to be minimizing substance use, at first denying alcohol and cocaine use, but when told of positive lab results, pt endorses she drinks alcohol "sometimes", unsure quantity; and takes cocaine "now and then", states last use day before admission, unsure of quantity. Pt is unable to differentiate prior hx of manic sx with periods of sobriety. Pt denies current AV/VH, paranoia, delusions; at first denied past hx, but then admits to past hx after chart review, states "it happened before, not anymore... not for a long time", refusing to discuss prior psychosis. Denies withdrawal sx, negative hand tremor. Pt became increasing guarded and agitated, refusing to answer further questions, refuses to start standing medications and refuses to provide collateral and permission to speak with her mother, Paula Abbott.    Attempted to contact pt's last provider, as per chart review pt was to follow-up at [facility] OP clinic ([phone]) - as per clinic, pt was a no-show for her intake. States pt was discharged from [facility]'s 8W IPP ([phone]) - left VM and callback #. Pt seen and evaluated, chart reviewed. As per nursing report, pt had no acute events overnight. On evaluation, pt presents sleeping in bed, arousable to verbal stimuli but requesting for this writer to come back later for interview. On reevaluation, pt appears linear and does not appear to be responding to internal stimuli. Pt presents with an odd affect, at times smiling and laughing inappropriately when discussing suicide and then becoming increasingly guarded and evasive when discussing history of psychosis as per chart review. Pt endorses having “sadness”, unable to quantify for how long stating "for awhile", unable to identify trigger, shrugging her shoulders. Pt endorses past medications include Invega and risperdal, however is unable to state dose and when last received, states she received a shot when last discharged at [facility] [place] "awhile ago... I didn't  medications", denies current OP providers, unable to identify prior providers and pharmacy. When evaluated on depression, pt states she has been sleeping a lot, denies changes to her appetite, shrugging on evaluation of motivation and concentration. Denies current SI/HI, intent and plan. Pt endorses hx of not sleeping for days and feeling full of energy, states last time "awhile ago". Pt appears to be minimizing substance use, at first denying alcohol and cocaine use, but when told of positive lab results, pt endorses she drinks alcohol "sometimes", unsure quantity; and takes cocaine "now and then", states last use day before admission, unsure of quantity. Pt is unable to differentiate prior hx of manic sx with periods of sobriety. Pt denies current AV/VH, paranoia, delusions; at first denied past hx, but then admits to past hx after chart review, states "it happened before, not anymore... not for a long time", refusing to discuss prior psychosis and becoming increasingly agitated with further questioning on past psychosis. Pt refuses to answer further questions, refuses to start standing medications and refuses to provide collateral and permission to speak with her mother, Paula Abbott, as listed in chart review.    Attempted to contact pt's last provider, as per chart review pt was to follow-up at [facility] [place] OP clinic ([phone]) - as per clinic, pt was a no-show for her intake. States pt was discharged from [facility] [place]'s 8[facility] ([phone]) - left VM and callback #.

## 2020-08-17 NOTE — CHART NOTE - NSCHARTNOTEFT_GEN_A_CORE
Social Work Admit Note:    Patient is 40 years of age female who was admitted for evaluation after persistent feelings of sadness along with suicidal thoughts, no plan or intent. Patient agrees to meet with treatment team for evaluation, chart reviewed as well. Patient presents with soft low speech and at times laughs inappropriately when discussing mental health history.  Patient denies mental health history and indicates there is no one in the community who can provide collateral. Upon reviewing chart it appears patient was previously at the [facility].     Patient endorsed persistent sadness the past few months, indicates that there was no precipitating factor states that she "just feels sad". Patient denies illicit drug use however UDS is positive for cocaine. Results discussed patient then admitted to using intermittently most recently a few days prior to admission. Patient is a poor historian and or is minimizing symptoms, MH HX/TX and SA usage.  Audio / visual hallucinations denied.      In the community patient resides in a private residence with her spouse and two children – ages 20 and 22 years of age.  She is unemployed.  Highest level of education is three years of college.  She stopped working after having her first child and did not return to work.     Sexual History – patient identifies as heterosexual. 	  Family relationships and history – Patient is  with two children.  She reports good relations with family members.    Leisure Activity Assessment – spending time with her family and children.       Community Supports – No known attendance in any self- help groups or other organizations.   Employment – patient is not employed at this time.   Substance Use Assessment – use of alcohol or other substances denied.     History of suicidality or self- injurious behaviors – history of cutting.      Significant Loses – None identified.    Life Goals – patient verbalized goal of possible return to work in the future.       will continue to meet with patient 1:1 and with treatment team daily.  Discharge plan is for continued mental health treatment in outpatient setting.    Please refer to Social Work Psychosocial for additional information. Social Work Admit Note:    Patient is 40 years of age female who was admitted for evaluation after persistent feelings of sadness along with suicidal thoughts, no plan or intent. Patient agrees to meet with treatment team for evaluation, chart reviewed as well. Patient presents with soft low speech and at times laughs inappropriately when discussing mental health history.  Patient denies mental health history and indicates there is no one in the community who can provide collateral. Upon reviewing chart it appears patient was previously at the [facility].        Patient endorsed persistent sadness the past few months, indicates that there was no precipitating factor states that she "just feels sad". Patient denies illicit drug use however UDS is positive for cocaine. Results discussed, patient then admitted to using intermittently most recently a few days prior to admission. Patient is a poor historian and or is minimizing symptoms, MH HX/TX and SA usage. As per chart: Patient is a 41 yo woman, domiciled with mother in apartment, unemployed, PPH of schizoaffective disorder, polysubstance use (alcohol, crack cocaine), multiple prior hospitalizations, most recently discharged from [facility] on 7/31/20 after admission for low mood, reported history of Invega IM and AOT, previous suicide attempts, with remote PMH of seizure disorder per chart, who walked in reporting sadness.    Audio / visual hallucinations denied.  Suicidal plan and or intent denied at this time. Patient remains with suicidal thoughts, feelings of helplessness and hopelessness. Patient remains with lack of motivation, lethargy and low mood. Patient contracts for safety on unit and is amenable to treatment.         Sexual History – patient identifies as homosexual. 	  Family relationships and history – patient is single and lives with her mother.  Leisure Activity Assessment – spending time with her family and friends.       Community Supports – no known attendance in any self- help groups or other organizations.   Employment – patient is not employed at this time.   Substance Use Assessment – use of alcohol or other substances denied UDS + for cocaine.     History of suicidality or self- injurious behaviors – denies.       Significant Loses – none identified.    Life Goals – patient verbalized goal of feeling better.       will continue to meet with patient 1:1 and with treatment team daily.  Discharge plan is for continued mental health treatment in outpatient setting.      Please refer to Social Work Psychosocial for additional information.

## 2020-08-17 NOTE — CONSULT NOTE ADULT - REASON FOR ADMISSION
40 YEARS OLD FEMALE TRANSFERRED FROM [facility] TO ADMIT TO [facility] UNIT FOR SUICIDAL THOUGHTS .

## 2020-08-17 NOTE — CONSULT NOTE ADULT - SUBJECTIVE AND OBJECTIVE BOX
FIDENCIO GARSIA  40y, Female  Allergy: Haldol (Unknown)      OVERNIGHT EVENTS:   39 y/o F, hx of schizophrenia admitted to [facility] for suicidal ideation. Patient seen and evaluated at bedside, he denied any medical condition. No chest pain, sob, palpitations.     REVIEW OF SYSTEMS:    CONSTITUTIONAL: No weakness, fevers or chills  EYES/ENT: No visual changes;  No vertigo or throat pain   NECK: No pain or stiffness  RESPIRATORY: No cough, wheezing, hemoptysis; No shortness of breath  CARDIOVASCULAR: No chest pain or palpitations  GASTROINTESTINAL: No abdominal or epigastric pain. No nausea, vomiting, or hematemesis; No diarrhea or constipation. No melena or hematochezia.  GENITOURINARY: No dysuria, frequency or hematuria  NEUROLOGICAL: No numbness or weakness  SKIN: No itching, rashes    PAST MEDICAL & SURGICAL HISTORY:  Schizophrenia    FAMILY HISTORY:  Reviewed and none    Social History:  Smokes 1PP of cigarette  Denied alcohol use      VITALS:  T(F): 97.4 (20 @ 19:55), Max: 97.4 (20 @ 19:55)  HR: 66 (20 @ 19:55)  BP: 113/72 (20 @ 19:55) (113/72 - 113/72)  RR: 18 (20 @ 19:55)  SpO2: --    General: WN/WD NAD  Neurology: A&Ox3, nonfocal, WALL x 4  Eyes: PERRLA/ EOMI, Gross vision intact  ENT/Neck: Neck supple, trachea midline, No JVD, Gross hearing intact  Respiratory: CTA B/L, No wheezing, rales, rhonchi  CV: RRR, S1S2, no murmurs, rubs or gallops  Abdominal: Soft, NT, ND +BS,   Extremities: No edema, + peripheral pulses  Skin: No Rashes, Hematoma, Ecchymosis    TESTS & MEASUREMENTS:  Height (cm): 172.7 (20 @ 19:55)  Weight (kg): 58.513 (20 @ 19:55)  BMI (kg/m2): 19.6 (20 @ 19:55)                          11.1   5.59  )-----------( 254      ( 15 Aug 2020 22:22 )             34.0       08-15    141  |  106  |  10  ----------------------------<  80  3.6   |  25  |  0.90    Ca    9.5      15 Aug 2020 22:22    TPro  7.4  /  Alb  4.3  /  TBili  0.3  /  DBili  x   /  AST  15  /  ALT  12  /  AlkPhos  63  08-15    LIVER FUNCTIONS - ( 15 Aug 2020 22:22 )  Alb: 4.3 g/dL / Pro: 7.4 g/dL / ALK PHOS: 63 U/L / ALT: 12 U/L / AST: 15 U/L / GGT: x                 Urinalysis Basic - ( 16 Aug 2020 10:09 )    Color: Yellow / Appearance: Clear / S.025 / pH: x  Gluc: x / Ketone: NEGATIVE  / Bili: Negative / Urobili: 1.0 E.U./dL   Blood: x / Protein: NEGATIVE mg/dL / Nitrite: NEGATIVE   Leuk Esterase: NEGATIVE / RBC: x / WBC x   Sq Epi: x / Non Sq Epi: x / Bacteria: x        RADIOLOGY & ADDITIONAL TESTS:    MEDICATIONS:  MEDICATIONS  (STANDING):    MEDICATIONS  (PRN):  hydrOXYzine hydrochloride 25 milliGRAM(s) Oral every 6 hours PRN anxiety  nicotine  Polacrilex Gum 2 milliGRAM(s) Oral every 2 hours PRN smoking cessation      HEALTH ISSUES - PROBLEM Dx:  Schizophrenia: Schizophrenia          Case discussed in details with:

## 2020-08-17 NOTE — PROGRESS NOTE BEHAVIORAL HEALTH - NSBHADDHXPSYSOCFT_PSY_A_CORE
As per chart review, reports living alone in an apartment, has upcoming court date for petit larceny

## 2020-08-17 NOTE — PROGRESS NOTE BEHAVIORAL HEALTH - NSBHCHARTREVIEWLAB_PSY_A_CORE FT
Complete Blood Count + Automated Diff (08.15.20 @ 22:22)    WBC Count: 5.59 K/uL    RBC Count: 3.73 M/uL    Hemoglobin: 11.1 g/dL    Hematocrit: 34.0 %    Mean Cell Volume: 91.2 fl    Mean Cell Hemoglobin: 29.8 pg    Mean Cell Hemoglobin Conc: 32.6 gm/dL    Red Cell Distrib Width: 14.1 %    Platelet Count - Automated: 254 K/uL    Auto Neutrophil #: 2.27 K/uL    Auto Lymphocyte #: 2.59 K/uL    Auto Monocyte #: 0.46 K/uL    Auto Eosinophil #: 0.24 K/uL    Auto Basophil #: 0.02 K/uL    Auto Neutrophil %: 40.6    Auto Lymphocyte %: 46.3 %    Auto Monocyte %: 8.2 %    Auto Eosinophil %: 4.3 %    Auto Basophil %: 0.4 %    Auto Immature Granulocyte %: 0.2 %    Nucleated RBC: 0 /100 WBCs  Comprehensive Metabolic Panel (08.15.20 @ 22:22)    Sodium, Serum: 141 mmol/L    Potassium, Serum: 3.6 mmol/L    Chloride, Serum: 106 mmol/L    Carbon Dioxide, Serum: 25 mmol/L    Anion Gap, Serum: 10 mmol/L    Blood Urea Nitrogen, Serum: 10 mg/dL    Creatinine, Serum: 0.90 mg/dL    Glucose, Serum: 80 mg/dL    Calcium, Total Serum: 9.5 mg/dL    Protein Total, Serum: 7.4 g/dL    Albumin, Serum: 4.3 g/dL    Bilirubin Total, Serum: 0.3 mg/dL    Alkaline Phosphatase, Serum: 63 U/L    Aspartate Aminotransferase (AST/SGOT): 15 U/L    Alanine Aminotransferase (ALT/SGPT): 12 U/L    eGFR if Non : 79    eGFR if : 91 Complete Blood Count + Automated Diff (08.15.20 @ 22:22)    WBC Count: 5.59 K/uL    RBC Count: 3.73 M/uL    Hemoglobin: 11.1 g/dL    Hematocrit: 34.0 %    Mean Cell Volume: 91.2 fl    Mean Cell Hemoglobin: 29.8 pg    Mean Cell Hemoglobin Conc: 32.6 gm/dL    Red Cell Distrib Width: 14.1 %    Platelet Count - Automated: 254 K/uL    Auto Neutrophil #: 2.27 K/uL    Auto Lymphocyte #: 2.59 K/uL    Auto Monocyte #: 0.46 K/uL    Auto Eosinophil #: 0.24 K/uL    Auto Basophil #: 0.02 K/uL    Auto Neutrophil %: 40.6    Auto Lymphocyte %: 46.3 %    Auto Monocyte %: 8.2 %    Auto Eosinophil %: 4.3 %    Auto Basophil %: 0.4 %    Auto Immature Granulocyte %: 0.2 %    Nucleated RBC: 0 /100 WBCs  Comprehensive Metabolic Panel (08.15.20 @ 22:22)    Sodium, Serum: 141 mmol/L    Potassium, Serum: 3.6 mmol/L    Chloride, Serum: 106 mmol/L    Carbon Dioxide, Serum: 25 mmol/L    Anion Gap, Serum: 10 mmol/L    Blood Urea Nitrogen, Serum: 10 mg/dL    Creatinine, Serum: 0.90 mg/dL    Glucose, Serum: 80 mg/dL    Calcium, Total Serum: 9.5 mg/dL    Protein Total, Serum: 7.4 g/dL    Albumin, Serum: 4.3 g/dL    Bilirubin Total, Serum: 0.3 mg/dL    Alkaline Phosphatase, Serum: 63 U/L    Aspartate Aminotransferase (AST/SGOT): 15 U/L    Alanine Aminotransferase (ALT/SGPT): 12 U/L    eGFR if Non : 79    eGFR if : 91  Pregnancy Profile, Urine (08.16.20 @ 10:09)    Pregnancy Profile, Urine: Negative  Alcohol, Blood (08.15.20 @ 22:22)    Alcohol, Blood: 79  Cocaine Metabolite, Urine (08.16.20 @ 10:09)    Cocaine Metabolite, Urine: Positive

## 2020-08-18 LAB
A1C WITH ESTIMATED AVERAGE GLUCOSE RESULT: 5.2 % — SIGNIFICANT CHANGE UP (ref 4–5.6)
CHOLEST SERPL-MCNC: 135 MG/DL — SIGNIFICANT CHANGE UP (ref 100–200)
ESTIMATED AVERAGE GLUCOSE: 103 MG/DL — SIGNIFICANT CHANGE UP (ref 68–114)
HDLC SERPL-MCNC: 51 MG/DL — SIGNIFICANT CHANGE UP
LIPID PNL WITH DIRECT LDL SERPL: 84 MG/DL — SIGNIFICANT CHANGE UP (ref 4–129)
TOTAL CHOLESTEROL/HDL RATIO MEASUREMENT: 2.6 RATIO — LOW (ref 4–5.5)
TRIGL SERPL-MCNC: 48 MG/DL — SIGNIFICANT CHANGE UP (ref 10–149)
TSH SERPL-MCNC: 0.8 UIU/ML — SIGNIFICANT CHANGE UP (ref 0.27–4.2)

## 2020-08-18 PROCEDURE — 99231 SBSQ HOSP IP/OBS SF/LOW 25: CPT

## 2020-08-18 RX ORDER — RISPERIDONE 4 MG/1
1 TABLET ORAL EVERY 8 HOURS
Refills: 0 | Status: DISCONTINUED | OUTPATIENT
Start: 2020-08-18 | End: 2020-08-21

## 2020-08-18 RX ORDER — HYDROXYZINE HCL 10 MG
50 TABLET ORAL EVERY 6 HOURS
Refills: 0 | Status: DISCONTINUED | OUTPATIENT
Start: 2020-08-18 | End: 2020-08-21

## 2020-08-18 RX ORDER — RISPERIDONE 4 MG/1
1 TABLET ORAL AT BEDTIME
Refills: 0 | Status: DISCONTINUED | OUTPATIENT
Start: 2020-08-18 | End: 2020-08-21

## 2020-08-18 RX ADMIN — RISPERIDONE 1 MILLIGRAM(S): 4 TABLET ORAL at 20:19

## 2020-08-18 RX ADMIN — Medication 2 MILLIGRAM(S): at 17:15

## 2020-08-18 RX ADMIN — Medication 50 MILLIGRAM(S): at 20:19

## 2020-08-18 NOTE — PROGRESS NOTE BEHAVIORAL HEALTH - SUMMARY
39 y/o woman, domiciled with mother in apartment?, unemployed, PPH: schizoaffective disorder, polysubstance use (alcohol, crack cocaine), multiple prior hospitalizations, most recently discharged from [facility] on 7/31/20 after admission for low mood, reported history of Invega IM and AOT, previous suicide attempts, with remote PMH of seizure disorder per chart, BIB self to [facility] ED reporting sadness. On evaluation, pt presents with odd affect and inappropriate smiling/laughing while discussing past SA and mood history, becoming increasingly guarded and evasive on hx of psychosis. Of note, on admission BAL 79 and UDS positive cocaine, pt appears to be minimizing substance use hx, addiction consult pending. Her presentation seems consistent with SIMD at this time, however d/t pt's hx of schizoaffective disorder and inability to confirm with collaterals on medication history, decompensation in context of medication nonadherence remains on differential.     #Schizoaffective d/o; R/O SIMD  -consider starting risperdal 1 mg bid  -start risperdal 1 mg q8h prn for agitation  -pt states received Invega NAGY prior to discharge from [facility], unable to verify  -c/w hydroxyzine 25 mg q6h prn for anxiety or/and insomnia    #Polysubstance abuse (cocaine, ETOH, tobacco)  -pt denies withdrawal sx  -monitor for s/sx of withdrawal  -start librium 25 mg q4h prn for objective s/sx of alcohol withdrawal  -addiction consult pending  -pt requests rehab on discharge 39 y/o woman, domiciled with mother in apartment?, unemployed, PPH: schizoaffective disorder, polysubstance use (alcohol, crack cocaine), multiple prior hospitalizations, most recently discharged from [facility] on 7/31/20 after admission for low mood, reported history of Invega IM and AOT, previous suicide attempts, with remote PMH of seizure disorder per chart, BIB self to [facility] ED reporting sadness. On evaluation, pt presents with odd affect and inappropriate smiling/laughing while discussing past SA and mood history, becoming increasingly guarded and evasive on hx of psychosis. Of note, on admission BAL 79 and UDS positive cocaine, pt appears to be minimizing substance use hx, addiction consult pending. Her presentation seems consistent with SIMD at this time, however d/t pt's hx of schizoaffective disorder and inability to confirm with collaterals on medication history, decompensation in context of medication nonadherence remains on differential.     #Schizoaffective d/o; R/O SIMD  -start risperdal 1 mg qhs  -start risperdal 1 mg q8h prn for agitation  -pt states received Invega NAGY prior to discharge from [facility], unable to verify  -c/w hydroxyzine 25 mg q6h prn for anxiety or/and insomnia    #Polysubstance abuse (cocaine, ETOH, tobacco)  -pt denies withdrawal sx  -monitor for s/sx of withdrawal  -start librium 25 mg q4h prn for objective s/sx of alcohol withdrawal  -addiction consult pending  -pt requests rehab on discharge

## 2020-08-18 NOTE — PROGRESS NOTE BEHAVIORAL HEALTH - NSBHCHARTREVIEWLAB_PSY_A_CORE FT
Complete Blood Count + Automated Diff (08.15.20 @ 22:22)    WBC Count: 5.59 K/uL    RBC Count: 3.73 M/uL    Hemoglobin: 11.1 g/dL    Hematocrit: 34.0 %    Mean Cell Volume: 91.2 fl    Mean Cell Hemoglobin: 29.8 pg    Mean Cell Hemoglobin Conc: 32.6 gm/dL    Red Cell Distrib Width: 14.1 %    Platelet Count - Automated: 254 K/uL    Auto Neutrophil #: 2.27 K/uL    Auto Lymphocyte #: 2.59 K/uL    Auto Monocyte #: 0.46 K/uL    Auto Eosinophil #: 0.24 K/uL    Auto Basophil #: 0.02 K/uL    Auto Neutrophil %: 40.6    Auto Lymphocyte %: 46.3 %    Auto Monocyte %: 8.2 %    Auto Eosinophil %: 4.3 %    Auto Basophil %: 0.4 %    Auto Immature Granulocyte %: 0.2 %    Nucleated RBC: 0 /100 WBCs  Comprehensive Metabolic Panel (08.15.20 @ 22:22)    Sodium, Serum: 141 mmol/L    Potassium, Serum: 3.6 mmol/L    Chloride, Serum: 106 mmol/L    Carbon Dioxide, Serum: 25 mmol/L    Anion Gap, Serum: 10 mmol/L    Blood Urea Nitrogen, Serum: 10 mg/dL    Creatinine, Serum: 0.90 mg/dL    Glucose, Serum: 80 mg/dL    Calcium, Total Serum: 9.5 mg/dL    Protein Total, Serum: 7.4 g/dL    Albumin, Serum: 4.3 g/dL    Bilirubin Total, Serum: 0.3 mg/dL    Alkaline Phosphatase, Serum: 63 U/L    Aspartate Aminotransferase (AST/SGOT): 15 U/L    Alanine Aminotransferase (ALT/SGPT): 12 U/L    eGFR if Non : 79    eGFR if : 91  Lipid Profile in AM (08.18.20 @ 09:03)    Total Cholesterol/HDL Ratio Measurement: 2.6 Ratio    Cholesterol, Serum: 135 mg/dL    Triglycerides, Serum: 48 mg/dL    HDL Cholesterol, Serum: 51    Direct LDL: 84  Alcohol, Blood (08.15.20 @ 22:22)  Alcohol, Blood: 79  Cocaine Metabolite, Urine (08.16.20 @ 10:09)  Cocaine Metabolite, Urine: Positive

## 2020-08-18 NOTE — PROGRESS NOTE BEHAVIORAL HEALTH - NSBHFUPINTERVALHXFT_PSY_A_CORE
Pt seen and evaluated, chart reviewed. As per nursing report, pt became agitated overnight and tremulous, PRNs risperdal, hydroxyzine and librium given with good effect. On evaluation, pt presents linear, remains with odd effect, does not appear to be responding to internal stimuli. Pt endorses improved mood since admission, states mood "I'm fine", denies sadness from yesterday, endorses concern of anxiety she experienced last night. Pt states she is unsure why she felt anxious; in questioning pt's substance use, pt denies daily alcohol use, states drank 2 glasses of wine before admission, and denies daily cocaine use "only sometimes". Denies withdrawal sx, denies shakiness, HA, nausea/diarrhea, sweats; negative hand and lingual tremor. Of note, pt appears to be minimizing substance use, addiction consult pending. Denies AV/VH, paranoia, delusions. Denies SI/HI, intent and plan. Pt continues to refuse permission for this writer to obtain collateral from her mother, Paula Abbott, states she does not live with her, states she lives "with a friend", refuses to provide name and contact information. Pt requests rehab at discharge.

## 2020-08-19 PROCEDURE — 99231 SBSQ HOSP IP/OBS SF/LOW 25: CPT

## 2020-08-19 RX ORDER — PALIPERIDONE 1.5 MG/1
234 TABLET, EXTENDED RELEASE ORAL ONCE
Refills: 0 | Status: COMPLETED | OUTPATIENT
Start: 2020-08-20 | End: 2020-08-20

## 2020-08-19 RX ADMIN — Medication 50 MILLIGRAM(S): at 20:36

## 2020-08-19 RX ADMIN — RISPERIDONE 1 MILLIGRAM(S): 4 TABLET ORAL at 20:07

## 2020-08-19 RX ADMIN — Medication 2 MILLIGRAM(S): at 12:15

## 2020-08-19 NOTE — DISCHARGE NOTE BEHAVIORAL HEALTH - REASON FOR ADMISSION
BIB self to [facility] ED reporting "sadness" and SI in context of relapse on illicit substances

## 2020-08-19 NOTE — DISCHARGE NOTE BEHAVIORAL HEALTH - NSBHDCADMRISKMITFT_PSY_A_CORE
Patient is future oriented, endorses relapse on illicit as major contributing factor to current hospitalization and prior low mood, verbalizes motivation to maintain sobriety Patient is future oriented, endorses relapse on illicit as major contributing factor to current hospitalization and prior low mood, verbalizes motivation to maintain sobriety, active AOT with

## 2020-08-19 NOTE — DISCHARGE NOTE BEHAVIORAL HEALTH - NSTOBACCOREFERRAL_GEN_A_NCS
Yes Yes/Patient registered and referred to the [place] [facility]. Phone appointment scheduled for 8/24/2020 at 0900.

## 2020-08-19 NOTE — DISCHARGE NOTE BEHAVIORAL HEALTH - NSBHDCSUBSTHXFT_PSY_A_CORE
Of note, pt appears to be minimizing substance use, at first denying alcohol and cocaine use, but when told of positive UDS, pt endorses she drinks alcohol "sometimes", unsure quantity; and takes cocaine "now and then", states last use day before admission, unsure of quantity.

## 2020-08-19 NOTE — DISCHARGE NOTE BEHAVIORAL HEALTH - NSBHDCTHERAPYFT_PSY_A_CORE
Patient was provided with milieu therapy as well as daily supportive psychotherapy. Patient has been actively engaged in treatment, attending several groups. Patient has attended group related to illness management and recovery as well as DBT crisis skill group. Reinforced positive coping skills learned on the unit, TIPP skill. Patient was provided with milieu therapy as well as daily supportive psychotherapy. Reinforced positive coping skills learned on the unit, ie STOP skill.

## 2020-08-19 NOTE — DISCHARGE NOTE BEHAVIORAL HEALTH - NSBHDCRESPONSEFT_PSY_A_CORE
Pt has made significant progress over the course of hospitalization. With continuous psychotherapy from the treatment team and the medications, patient reports feeling better, sleeping and eating well. Thought process and insight improved. Pt was calm and cooperative and was in good behavioral control. Patient denied any suicidal or homicidal ideations. Patient denied any auditory or visual hallucinations. Pt was motivated to go to inpatient rehab, was screened and accepted. Pt was evaluated by treatment team, pt is stable for discharge and patient shows no imminent danger to self, others or property at this time. Pt understands and agrees with treatment plan and following up with outpatient. Psychoeducation provided regarding diagnosis, medications, treatment and follow up. Risks, benefits, alternatives discussed, all questions and concerns addressed and answered. Pt has made significant progress over the course of hospitalization. With continuous psychotherapy from the treatment team and the medications, patient reports feeling better, sleeping and eating well. Thought process and insight improved, pt verbalizes substance use as major contributing factor to hospitalization, endorses motivation to maintain sobriety. Pt was calm and cooperative and was in good behavioral control. Patient denied any suicidal or homicidal ideations. Patient denied any auditory or visual hallucinations. Pt was evaluated by treatment team, pt is stable for discharge and patient shows no imminent danger to self, others or property at this time. Pt understands and agrees with treatment plan and following up with outpatient. Psychoeducation provided regarding diagnosis, medications, treatment and follow up. Risks, benefits, alternatives discussed, all questions and concerns addressed and answered. Pt has made significant progress over the course of hospitalization. With continuous psychotherapy from the treatment team and the medications, patient reports feeling better, sleeping and eating well. Thought process and insight improved, pt verbalizes substance use as major contributing factor to hospitalization, endorses motivation to maintain sobriety, refuses rehab and MAT at this time, endorses plan to join AA/NA. Pt was calm and cooperative and was in good behavioral control. Patient denied any suicidal or homicidal ideations. Patient denied any auditory or visual hallucinations. Pt was evaluated by treatment team, pt is stable for discharge and patient shows no imminent danger to self, others or property at this time. Pt understands and agrees with treatment plan and following up with outpatient. Psychoeducation provided regarding diagnosis, medications, treatment and follow up. Risks, benefits, alternatives discussed, all questions and concerns addressed and answered.

## 2020-08-19 NOTE — PROGRESS NOTE BEHAVIORAL HEALTH - NSBHCHARTREVIEWLAB_PSY_A_CORE FT
Complete Blood Count + Automated Diff (08.15.20 @ 22:22)    WBC Count: 5.59 K/uL    RBC Count: 3.73 M/uL    Hemoglobin: 11.1 g/dL    Hematocrit: 34.0 %    Mean Cell Volume: 91.2 fl    Mean Cell Hemoglobin: 29.8 pg    Mean Cell Hemoglobin Conc: 32.6 gm/dL    Red Cell Distrib Width: 14.1 %    Platelet Count - Automated: 254 K/uL    Auto Neutrophil #: 2.27 K/uL    Auto Lymphocyte #: 2.59 K/uL    Auto Monocyte #: 0.46 K/uL    Auto Eosinophil #: 0.24 K/uL    Auto Basophil #: 0.02 K/uL    Auto Neutrophil %: 40.6    Auto Lymphocyte %: 46.3 %    Auto Monocyte %: 8.2 %    Auto Eosinophil %: 4.3 %    Auto Basophil %: 0.4 %    Auto Immature Granulocyte %: 0.2 %    Nucleated RBC: 0 /100 WBCs  Comprehensive Metabolic Panel (08.15.20 @ 22:22)    Sodium, Serum: 141 mmol/L    Potassium, Serum: 3.6 mmol/L    Chloride, Serum: 106 mmol/L    Carbon Dioxide, Serum: 25 mmol/L    Anion Gap, Serum: 10 mmol/L    Blood Urea Nitrogen, Serum: 10 mg/dL    Creatinine, Serum: 0.90 mg/dL    Glucose, Serum: 80 mg/dL    Calcium, Total Serum: 9.5 mg/dL    Protein Total, Serum: 7.4 g/dL    Albumin, Serum: 4.3 g/dL    Bilirubin Total, Serum: 0.3 mg/dL    Alkaline Phosphatase, Serum: 63 U/L    Aspartate Aminotransferase (AST/SGOT): 15 U/L    Alanine Aminotransferase (ALT/SGPT): 12 U/L    eGFR if Non : 79    eGFR if : 91  Lipid Profile in AM (08.18.20 @ 09:03)    Total Cholesterol/HDL Ratio Measurement: 2.6 Ratio    Cholesterol, Serum: 135 mg/dL    Triglycerides, Serum: 48 mg/dL    HDL Cholesterol, Serum: 51    Direct LDL: 84  Alcohol, Blood (08.15.20 @ 22:22)  Alcohol, Blood: 79  Cocaine Metabolite, Urine (08.16.20 @ 10:09)  Cocaine Metabolite, Urine: Positive  Thyroid Stimulating Hormone, Serum in AM (08.18.20 @ 09:03)  Thyroid Stimulating Hormone, Serum: 0.80 uIU/mL  A1C with Estimated Average Glucose in AM (08.18.20 @ 09:03)  A1C with Estimated Average Glucose Result: 5.2

## 2020-08-19 NOTE — PROGRESS NOTE BEHAVIORAL HEALTH - NSBHFUPINTERVALHXFT_PSY_A_CORE
Pt seen and evaluated, chart reviewed. As per nursing report, pt c/o insomnia last night, PRN trazodone given with good effect, medication compliant. On evaluation, pt presents linear, remains with odd effect, however does not appear to be responding to internal stimuli. Pt endorses improved mood since admission, states mood "I'm fine", denies continued sadness and anxiety, states ready for discharge. Denies AV/VH, paranoia, delusions. Denies SI/HI, intent and plan. Denies medication side effects. Denies withdrawal sx, no withdrawal sx noted. Of note, pt appears to be minimizing substance use, at first requesting rehab at discharge, but then retracting and writing 72 hr letter for discharge.

## 2020-08-19 NOTE — DISCHARGE NOTE BEHAVIORAL HEALTH - NSBHDCSUICFCTRMIT_PSY_A_CORE
Patient has been medication compliant, not endorsing any side effects. Patient verbalizing reasons for living, states motivation to maintain sobriety at discharge. Patient with improved insight into events that led up to hospitalization, able to verbalize substance use as major contributing factor to low mood. Patient to use positive coping skills learned during the course of the inpatient hospitalization (ie, STOP Skill). Patient verbalized willingness to seek care in moment of crisis. Patient is agreeable that should she have any thoughts of hurting herself or others, she will call 911, return to the ED. Patient has been medication compliant, not endorsing any side effects. Patient verbalizing reasons for living, states motivation to maintain sobriety at discharge. Patient with improved insight into events that led up to hospitalization, able to verbalize substance use as major contributing factor to low mood. Patient to use positive coping skills learned during the course of the inpatient hospitalization (ie, STOP Skill). Patient verbalized willingness to seek care in moment of crisis. Patient is agreeable that should she have any thoughts of hurting herself or others, she will call 911, return to the ED. Patient has active AOT and .

## 2020-08-19 NOTE — DISCHARGE NOTE BEHAVIORAL HEALTH - CONDITIONS AT DISCHARGE
Patient verbalizes understanding of discharge instructions and follow-up care. She denies hallucinations, delusions, and/or suicidal ideation at this time. No signs of distress noted. No complaints offered.

## 2020-08-19 NOTE — DISCHARGE NOTE BEHAVIORAL HEALTH - SECONDARY DIAGNOSIS.
Schizoaffective disorder, unspecified type Alcohol use disorder, mild, abuse Cocaine use disorder Tobacco use disorder

## 2020-08-19 NOTE — CHART NOTE - NSCHARTNOTEFT_GEN_A_CORE
Writer met with patient; Pt assessed writer provided support and education. Treatment plan and discharge plan discussed. Patient is compliant with treatment and unit protocol. Patient is taking medications as prescribed and is compliant with unit rules. Patient contracts for safety on the unit. Patient is oriented on all spheres, denies current suicidal and or homicidal ideations. Patient denies audio visual hallucinations but presents as internally preoccupied. Patient is in good behavioral control at this time. Activities of daily living are within normal limits. Patient refused referral to Health Home. Patient submitted 72 hour notice for discharge. Patient is voluntary does not meet clinical need for [facility]PC. Patient is slated for discharge tomorrow providing no regressive behaviors and or medical complications.    Mental Status Exam:    Mood – Low  Sleep - Fair  Appetite - Good  ADLs - Fair  Thought Process – Linear    Observation – [id number]    No barriers to discharge identified at this time. Plan is for referral to [facility] outpatient program.

## 2020-08-19 NOTE — PROGRESS NOTE BEHAVIORAL HEALTH - SUMMARY
39 y/o woman, domiciled with mother in apartment?, unemployed, PPH: schizoaffective disorder, polysubstance use (alcohol, crack cocaine), multiple prior hospitalizations, most recently discharged from [facility] on 7/31/20 after admission for low mood, reported history of Invega IM and AOT, previous suicide attempts, with remote PMH of seizure disorder per chart, BIB self to [facility] ED reporting sadness. On evaluation, pt presents with odd affect and inappropriate smiling/laughing while discussing past SA and mood history, becoming increasingly guarded and evasive on hx of psychosis. Of note, on admission BAL 79 and UDS positive cocaine, pt appears to be minimizing substance use hx, addiction consult pending. Her presentation seems consistent with SIMD at this time, however d/t pt's hx of schizoaffective disorder and inability to confirm with collaterals on medication history, decompensation in context of medication nonadherence remains on differential. On evaluation, pt presents linear, remains with odd effect, however does not appear to be responding to internal stimuli. Pt endorses improved mood, denies continued sadness and anxiety, denies SI/HI, states ready for discharge and submitted 72 hr letter for discharge (8/19/20).    #Schizoaffective d/o; R/O SIMD  -start risperdal 1 mg qhs  -start risperdal 1 mg q8h prn for agitation  -pt states received Invega NAGY prior to discharge from [facility], unable to verify  -c/w hydroxyzine 25 mg q6h prn for anxiety or/and insomnia    #Polysubstance abuse (cocaine, ETOH, tobacco)  -pt denies withdrawal sx; no withdrawal sx noted  -monitor for s/sx of withdrawal  -start librium 25 mg q4h prn for objective s/sx of alcohol withdrawal  -addiction consult pending  -pt refuses rehab at discharge

## 2020-08-19 NOTE — DISCHARGE NOTE BEHAVIORAL HEALTH - NSBHDCSUICPROTECTFT_PSY_A_CORE
Medication and treatment adherence, future orientation, ability to state reasons for living, improved insight, motivation to maintain sobriety, willingness to seek care in moment of crisis Medication and treatment adherence, future orientation, ability to state reasons for living, improved insight, motivation to maintain sobriety, willingness to seek care in moment of crisis, active AOT and

## 2020-08-19 NOTE — DISCHARGE NOTE BEHAVIORAL HEALTH - HPI (INCLUDE ILLNESS QUALITY, SEVERITY, DURATION, TIMING, CONTEXT, MODIFYING FACTORS, ASSOCIATED SIGNS AND SYMPTOMS)
41 yo woman, domiciled with mother in apartment, unemployed, PPH of schizoaffective disorder, polysubstance use (alcohol, crack cocaine), multiple prior hospitalizations, most recently discharged from [facility] on 7/31/20 after admission for low mood, reported history of Invega IM and AOT, previous suicide attempts, with remote PMH of seizure disorder per chart, BIB self to [facility] ED reporting sadness. The patient was uncooperative with initial assessment overnight, and continued to fall asleep during interview. BAL was 79 on arrival. She demonstrates an odd affect, smiling and laughing inappropriately. Her thought process however is overall linear and there appears to be no evidence of distraction or internal preoccupation. She is guarded and not forthcoming throughout the interview. She says “I feel sad” and reports vague SI with plan to overdose “on pills”. She denies HI, AH, paranoid thoughts. She says she is usually on Invega but “not in a while”. She denies any recent substance use, including alcohol. She does not provide any spontaneous information, and cannot allude to any current psychosocial stressors causing her low mood. She requests admission to “get back on medications” and is vague about her plan to follow up as an outpatient after. She declined to provide any collateral informants.     Collateral from [facility] system shows a recent admission per chart at [facility] for 10 days, discharged 7/31/20. The patient had presented similarly to the [facility] ER for reported “sadness”, and was found to be inappropriately smiling, guarded, and minimizing of her symptoms. She was admitted and re-started on Risperdal. The team was unable to reach her AOT/outpatient team or gather collateral regarding her Invega IM. The patient was safe for discharge 10 days later with a plan to follow up at [facility] outpatient clinic.    In IPP, pt appears linear and does not appear to be responding to internal stimuli. Pt presents with an odd affect, at times smiling and laughing inappropriately when discussing suicide and then becoming increasingly guarded and evasive when discussing history of psychosis as per chart review. Pt endorses having “sadness”, unable to quantify for how long stating "for awhile", unable to identify trigger, shrugging her shoulders. Pt endorses past medications include Invega and risperdal, however is unable to state dose and when last received, states she received a shot when last discharged at [facility] "awhile ago... I didn't  medications", denies current OP providers, unable to identify prior providers and pharmacy. When evaluated on depression, pt states she has been sleeping a lot, denies changes to her appetite, shrugging on evaluation of motivation and concentration. Denies current SI/HI, intent and plan. Pt endorses hx of not sleeping for days and feeling full of energy, states last time "awhile ago". Pt appears to be minimizing substance use, at first denying alcohol and cocaine use, but when told of positive lab results, pt endorses she drinks alcohol "sometimes", unsure quantity; and takes cocaine "now and then", states last use day before admission, unsure of quantity. Pt is unable to differentiate prior hx of manic sx with periods of sobriety. Pt denies current AV/VH, paranoia, delusions; at first denied past hx, but then admits to past hx after chart review, states "it happened before, not anymore... not for a long time", refusing to discuss prior psychosis and becoming increasingly agitated with further questioning on past psychosis. Pt refuses to answer further questions, refuses to start standing medications and refuses to provide collateral and permission to speak with her mother, Paula Abbott, as listed in chart review.    Attempted to contact pt's last provider, as per chart review pt was to follow-up at [facility] OP clinic ([phone]) - as per clinic, pt was a no-show for her intake. States pt was discharged from [facility]'s 8W IPP ([phone]) - left VM and callback #. 39 yo woman, domiciled with mother in apartment, unemployed, PPH of schizoaffective disorder, polysubstance use (alcohol, crack cocaine), multiple prior hospitalizations, most recently discharged from [facility] on 7/31/20 after admission for low mood, reported history of Invega IM and AOT, previous suicide attempts, with remote PMH of seizure disorder per chart, BIB self to [facility] ED reporting sadness. The patient was uncooperative with initial assessment overnight, and continued to fall asleep during interview. BAL was 79 on arrival. She demonstrates an odd affect, smiling and laughing inappropriately. Her thought process however is overall linear and there appears to be no evidence of distraction or internal preoccupation. She is guarded and not forthcoming throughout the interview. She says “I feel sad” and reports vague SI with plan to overdose “on pills”. She denies HI, AH, paranoid thoughts. She says she is usually on Invega but “not in a while”. She denies any recent substance use, including alcohol. She does not provide any spontaneous information, and cannot allude to any current psychosocial stressors causing her low mood. She requests admission to “get back on medications” and is vague about her plan to follow up as an outpatient after. She declined to provide any collateral informants.     Collateral from [facility] system shows a recent admission per chart at [facility] for 10 days, discharged 7/31/20. The patient had presented similarly to the [facility] ER for reported “sadness”, and was found to be inappropriately smiling, guarded, and minimizing of her symptoms. She was admitted and re-started on Risperdal. The team was unable to reach her AOT/outpatient team or gather collateral regarding her Invega IM. The patient was safe for discharge 10 days later with a plan to follow up at [facility] outpatient clinic.    In IPP, pt appears linear and does not appear to be responding to internal stimuli. Pt presents with an odd affect, at times smiling and laughing inappropriately when discussing suicide and then becoming increasingly guarded and evasive when discussing history of psychosis as per chart review. Pt endorses having “sadness”, unable to quantify for how long stating "for awhile", unable to identify trigger, shrugging her shoulders. Pt endorses past medications include Invega and risperdal, however is unable to state dose and when last received, states she received a shot when last discharged at [facility] "awhile ago... I didn't  medications", denies current OP providers, unable to identify prior providers and pharmacy. When evaluated on depression, pt states she has been sleeping a lot, denies changes to her appetite, shrugging on evaluation of motivation and concentration. Denies current SI/HI, intent and plan. Pt endorses hx of not sleeping for days and feeling full of energy, states last time "awhile ago". Pt appears to be minimizing substance use, at first denying alcohol and cocaine use, but when told of positive lab results, pt endorses she drinks alcohol "sometimes", unsure quantity; and takes cocaine "now and then", states last use day before admission, unsure of quantity. Pt is unable to differentiate prior hx of manic sx with periods of sobriety. Pt denies current AV/VH, paranoia, delusions; at first denied past hx, but then admits to past hx after chart review, states "it happened before, not anymore... not for a long time", refusing to discuss prior psychosis and becoming increasingly agitated with further questioning on past psychosis. Pt refuses to answer further questions, refuses to start standing medications and refuses to provide collateral and permission to speak with her mother, Paula Abbott, as listed in chart review.    As per chart review pt was to follow-up at [facility] OP clinic ([phone]) - as per clinic, pt was a no-show for her intake. [facility] pt was discharged from [facility]'s 8W IPP ([phone]) - spoke with IPP provider, [facility] pt was discharged on risperdal 1 mg bid and did not receive a NAGY before discharge.    Spoke with pt's OP provider at [facility], Dr. Gandara ([phone]) - [facility] pt last received Invega Sustenna 234 mg on 7/31/2020. [facility] pt has been in and out of several hospitals within the last month. Pt with active AOT and , Ms. Cole ([phone]). 41 yo woman, domiciled with mother in apartment, unemployed, PPH of schizoaffective disorder, polysubstance use (alcohol, crack cocaine), multiple prior hospitalizations, most recently discharged from [facility] on 7/31/20 after admission for low mood, reported history of Invega IM and AOT, previous suicide attempts, with remote PMH of seizure disorder per chart, BIB self to [facility] ED reporting sadness. The patient was uncooperative with initial assessment overnight, and continued to fall asleep during interview. BAL was 79 on arrival. She demonstrates an odd affect, smiling and laughing inappropriately. Her thought process however is overall linear and there appears to be no evidence of distraction or internal preoccupation. She is guarded and not forthcoming throughout the interview. She says “I feel sad” and reports vague SI with plan to overdose “on pills”. She denies HI, AH, paranoid thoughts. She says she is usually on Invega but “not in a while”. She denies any recent substance use, including alcohol. She does not provide any spontaneous information, and cannot allude to any current psychosocial stressors causing her low mood. She requests admission to “get back on medications” and is vague about her plan to follow up as an outpatient after. She declined to provide any collateral informants.     Collateral from [facility] system shows a recent admission per chart at [facility] for 10 days, discharged 7/31/20. The patient had presented similarly to the [facility] ER for reported “sadness”, and was found to be inappropriately smiling, guarded, and minimizing of her symptoms. She was admitted and re-started on Risperdal. The team was unable to reach her AOT/outpatient team or gather collateral regarding her Invega IM. The patient was safe for discharge 10 days later with a plan to follow up at [facility] outpatient clinic.    In IPP, pt appears linear and does not appear to be responding to internal stimuli. Pt presents with an odd affect, at times smiling and laughing inappropriately when discussing suicide and then becoming increasingly guarded and evasive when discussing history of psychosis as per chart review. Pt endorses having “sadness”, unable to quantify for how long stating "for awhile", unable to identify trigger, shrugging her shoulders. Pt endorses past medications include Invega and risperdal, however is unable to state dose and when last received, states she received a shot when last discharged at [facility] "awhile ago... I didn't  medications", denies current OP providers, unable to identify prior providers and pharmacy. When evaluated on depression, pt states she has been sleeping a lot, denies changes to her appetite, shrugging on evaluation of motivation and concentration. Denies current SI/HI, intent and plan. Pt endorses hx of not sleeping for days and feeling full of energy, states last time "awhile ago". Pt appears to be minimizing substance use, at first denying alcohol and cocaine use, but when told of positive lab results, pt endorses she drinks alcohol "sometimes", unsure quantity; and takes cocaine "now and then", states last use day before admission, unsure of quantity. Pt is unable to differentiate prior hx of manic sx with periods of sobriety. Pt denies current AV/VH, paranoia, delusions; at first denied past hx, but then admits to past hx after chart review, states "it happened before, not anymore... not for a long time", refusing to discuss prior psychosis and becoming increasingly agitated with further questioning on past psychosis. Pt refuses to answer further questions, refuses to start standing medications and refuses to provide collateral and permission to speak with her mother, Paula Abbott, as listed in chart review.    As per chart review pt was to follow-up at [facility] OP clinic ([phone]) - as per clinic, pt was a no-show for her intake. [facility] pt was discharged from [facility]'s 8W IPP ([phone]) - spoke with IPP provider, [facility] pt was discharged on risperdal 1 mg bid and did not receive a NAGY before discharge.    Spoke with pt's OP provider at [facility], Dr. Gandara ([phone]) - [facility] pt last received Invega Sustenna 234 mg on 7/21/2020. [facility] pt has been in and out of several hospitals within the last month. Pt with active AOT and , Ms. Cole ([phone]).

## 2020-08-19 NOTE — DISCHARGE NOTE BEHAVIORAL HEALTH - MEDICATION SUMMARY - MEDICATIONS TO TAKE
I will START or STAY ON the medications listed below when I get home from the hospital:    Invega Sustenna 234 mg/1.5 mL intramuscular suspension, extended release  -- 1 dose(s) intramuscular every 4 weeks Continue to take as prescribed until told otherwise by your provider  Last dose given: 08/21/2020  Next dose due: 09/18/2020  -- Indication: For Schizoaffective disorder    nicotine  -- 1 gum by mouth 4 times a day, As Needed -smoking cessation Continue to take as prescribed until told otherwise by your provider  -- Indication: For Smoking cessation

## 2020-08-19 NOTE — DISCHARGE NOTE BEHAVIORAL HEALTH - NSBHDCSUICFCTROTHERFT_PSY_A_CORE
Patient and provider identified future stressors as being nonadherence with medication/outpatient follow-up, relapse with illicit substances, financial and residential instability.

## 2020-08-19 NOTE — DISCHARGE NOTE BEHAVIORAL HEALTH - NSBHDCMEDSFT_PSY_A_CORE
Started on Risperdal 1 mg qhs. As per pt, received Invega NAGY prior to discharge on 7/31/2020 from last hospitalization at [facility], unable to confirm. Pt tolerated medication well, denied side effects. Started on Risperdal 1 mg qhs. Confirmed with pt's OP provider that pt last received Invega Sustenna 234 mg IM on 7/21/2020 and due for next injection prior to discharge. Pt tolerated medication well, denied side effects.

## 2020-08-19 NOTE — DISCHARGE NOTE BEHAVIORAL HEALTH - NSBHDCDXVALIDYESFT_PSY_A_CORE
Substance induced mood diisorder added as primary diagnosis, UDS positive for heroin and cocaine, pt later states "The sadness was because of the drugs" Substance induced mood disorder added as primary diagnosis, UDS positive for heroin and cocaine, pt later states "The sadness was because of the drugs"  Schizoaffective disorder, Alcohol use disorder, Cocaine use disorder, Tobacco use disorder Substance induced mood disorder added as primary diagnosis, UDS positive cocaine and BAL 79 on admission, pt later states "The sadness was because of the drugs"  Schizoaffective disorder, Alcohol use disorder, Cocaine use disorder, Tobacco use disorder

## 2020-08-19 NOTE — DISCHARGE NOTE BEHAVIORAL HEALTH - PAST PSYCHIATRIC HISTORY
Schizoaffective disorder, polysubstance use (alcohol, crack cocaine), multiple prior hospitalizations, most recently discharged from [facility] on 7/31/20 after admission for low mood, reported history of Invega IM and AOT, previous suicide attempts Schizoaffective disorder, polysubstance use (alcohol, crack cocaine), multiple prior hospitalizations, most recently discharged from [facility] on 7/31/20 after admission for low mood, active AOT, previous suicide attempts

## 2020-08-20 VITALS
HEART RATE: 68 BPM | SYSTOLIC BLOOD PRESSURE: 101 MMHG | DIASTOLIC BLOOD PRESSURE: 64 MMHG | RESPIRATION RATE: 18 BRPM | TEMPERATURE: 97 F

## 2020-08-20 PROCEDURE — 99231 SBSQ HOSP IP/OBS SF/LOW 25: CPT

## 2020-08-20 RX ADMIN — Medication 2 MILLIGRAM(S): at 22:01

## 2020-08-20 RX ADMIN — RISPERIDONE 1 MILLIGRAM(S): 4 TABLET ORAL at 20:51

## 2020-08-20 RX ADMIN — Medication 50 MILLIGRAM(S): at 17:55

## 2020-08-20 NOTE — PROGRESS NOTE BEHAVIORAL HEALTH - NSBHFUPINTERVALHXFT_PSY_A_CORE
Pt seen and evaluated, chart reviewed. As per nursing report, pt had no acute events overnight, medication compliant. On evaluation, pt presents linear, remains with odd effect, however does not appear to be responding to internal stimuli. Pt endorses good mood, sleep, appetite and anxiety. Pt states she feels ready for discharge. Denies AV/VH, paranoia, delusions. Denies SI/HI, intent and plan. Denies medication side effects. Denies withdrawal sx, no withdrawal sx noted. Of note, pt wrote 72 hr letter for discharge 8/19/20.    Spoke with pt's OP provider at [facility], Dr. Gandara ([phone]) - [facility] pt last received Invega Sustenna 234 mg on 7/21/2020 and is due for next dose today, preferably prior to discharge. [facility] pt has been in and out of several hospitals within the last month. Pt with active AOT and , Ms. Cole ([phone]).    Spoke with [facility]'s  IPP ([phone]) - as per collateral, [facility] pt was discharged on 7/31/2020 on Risperdal 1 mg bid and did not receive a NAGY before discharge. Pt seen and evaluated, chart reviewed. As per nursing report, pt had no acute events overnight, medication compliant. On evaluation, pt presents linear, remains with odd effect, however does not appear to be responding to internal stimuli. Pt endorses good mood, sleep, appetite and anxiety. Pt states she feels ready for discharge. Denies AV/VH, paranoia, delusions. Denies SI/HI, intent and plan. Denies medication side effects. Denies withdrawal sx, no withdrawal sx noted. Of note, pt wrote 72 hr letter for discharge 8/19/20. Agrees to Invega Sustenna NAGY prior to discharge, pending procurement from pharmacy.     Spoke with pt's OP provider at [facility], Dr. Gandara ([phone]) - \[facility]\"" pt last received Invega Sustenna 234 mg on 7/21/2020 and is due for next dose today, preferably prior to discharge. [facility] pt has been in and out of several hospitals within the last month. Pt with active AOT and , Ms. Cole ([phone]).    Spoke with [facility]'s 8W IPP ([phone]) - as per collateral, \[facility]\"" pt was discharged on 7/31/2020 on Risperdal 1 mg bid and did not receive a NAGY before discharge.

## 2020-08-20 NOTE — PROGRESS NOTE BEHAVIORAL HEALTH - SUMMARY
39 y/o woman, domiciled with mother in apartment?, unemployed, PPH: schizoaffective disorder, polysubstance use (alcohol, crack cocaine), multiple prior hospitalizations, most recently discharged from [facility] [place] on 7/31/20 after admission for low mood, reported history of Invega IM and AOT, previous suicide attempts, with remote PMH of seizure disorder per chart, BIB self to [facility] ED reporting sadness. On evaluation, pt presents with odd affect and inappropriate smiling/laughing while discussing past SA and mood history, becoming increasingly guarded and evasive on hx of psychosis. Of note, on admission BAL 79 and UDS positive cocaine, pt appears to be minimizing substance use hx, addiction consult pending. Her presentation seems consistent with SIMD at this time, however d/t pt's hx of schizoaffective disorder and inability to confirm with collaterals on medication history, decompensation in context of medication nonadherence remains on differential. On evaluation, pt presents linear, remains with odd effect, however does not appear to be responding to internal stimuli. Pt endorses improved mood, denies continued sadness and anxiety, denies SI/HI, states ready for discharge and submitted 72 hr letter for discharge (8/19/20). Obtained collateral from pt's OP provider - pt due for Invega Sustenna 234 mg prior to discharge, has active AOT and , Ms. Cole.     #Schizoaffective d/o; R/O SIMD  -start risperdal 1 mg qhs --> d/c when pt receives Invega Sustenna  -start Invega Sustenna 234 mg IM once prior to discharge  -start risperdal 1 mg q8h prn for agitation  -c/w hydroxyzine 25 mg q6h prn for anxiety or/and insomnia    #Polysubstance abuse (cocaine, ETOH, tobacco)  -pt denies withdrawal sx; no withdrawal sx noted  -monitor for s/sx of withdrawal  -start librium 25 mg q4h prn for objective s/sx of alcohol withdrawal --> d/c  -addiction consulted  -pt refuses rehab at discharge

## 2020-08-20 NOTE — PROGRESS NOTE BEHAVIORAL HEALTH - AXIS III
As per chart review, remote PMH of seizure disorder

## 2020-08-20 NOTE — PROGRESS NOTE BEHAVIORAL HEALTH - RISK ASSESSMENT
Chronic risk factors include history of psychiatric illness, history of mood symptoms, history of substance use, prior suicide attempts. Acute factors include recent substance use, current mood symptoms, current endorsement of SI. Mitigating factors include patient’s vagueness without intent to die, help-seeking behavior, and stable housing with mother (unconfirmed). Overall, patient has a moderate chronic risk but low acute risk of suicide.

## 2020-08-20 NOTE — PROGRESS NOTE BEHAVIORAL HEALTH - NSBHCHARTREVIEWVS_PSY_A_CORE FT
Vital Signs Last 24 Hrs  T(C): 36.7 (18 Aug 2020 21:24), Max: 36.7 (18 Aug 2020 21:24)  T(F): 98.1 (18 Aug 2020 21:24), Max: 98.1 (18 Aug 2020 21:24)  HR: 67 (18 Aug 2020 21:24) (67 - 67)  BP: 95/54 (18 Aug 2020 21:24) (95/54 - 95/54)  BP(mean): --  RR: 18 (18 Aug 2020 21:24) (18 - 18)  SpO2: --
Vital Signs Last 24 Hrs  T(C): 37 (17 Aug 2020 16:31), Max: 37 (17 Aug 2020 16:31)  T(F): 98.6 (17 Aug 2020 16:31), Max: 98.6 (17 Aug 2020 16:31)  HR: 50 (17 Aug 2020 20:09) (50 - 56)  BP: 138/70 (17 Aug 2020 20:09) (106/59 - 138/70)  BP(mean): --  RR: 16 (17 Aug 2020 20:09) (16 - 16)  SpO2: --
Vital Signs Last 24 Hrs  T(C): 35.7 (17 Aug 2020 10:26), Max: 37.1 (16 Aug 2020 17:35)  T(F): 96.2 (17 Aug 2020 10:26), Max: 98.8 (16 Aug 2020 17:35)  HR: 90 (17 Aug 2020 10:26) (56 - 90)  BP: 88/68 (17 Aug 2020 10:26) (88/68 - 131/72)  BP(mean): --  RR: 18 (17 Aug 2020 10:26) (16 - 18)  SpO2: 100% (16 Aug 2020 17:35) (99% - 100%)
Vital Signs Last 24 Hrs  T(C): 36.3 (20 Aug 2020 10:13), Max: 36.8 (20 Aug 2020 06:13)  T(F): 97.4 (20 Aug 2020 10:13), Max: 98.3 (20 Aug 2020 06:13)  HR: 68 (20 Aug 2020 10:13) (60 - 100)  BP: 101/64 (20 Aug 2020 10:13) (98/56 - 101/64)  BP(mean): --  RR: 18 (20 Aug 2020 10:13) (16 - 20)  SpO2: --

## 2020-08-20 NOTE — PROGRESS NOTE BEHAVIORAL HEALTH - NSBHCHARTREVIEWINVESTIGATE_PSY_A_CORE FT
< from: 12 Lead ECG (08.15.20 @ 22:13) >      Ventricular Rate 51 BPM    Atrial Rate 51 BPM    P-R Interval 174 ms    QRS Duration 90 ms    Q-T Interval 434 ms    QTC Calculation(Bezet) 400 ms    P Axis -18 degrees    R Axis 86 degrees    T Axis 69 degrees    Diagnosis Line Sinus bradycardia    < end of copied text >

## 2020-08-20 NOTE — PROGRESS NOTE BEHAVIORAL HEALTH - SECONDARY DX2
Alcohol use disorder, mild, abuse

## 2020-08-20 NOTE — PROGRESS NOTE BEHAVIORAL HEALTH - NSBHADMITMEDEDUDETAILS_A_CORE FT
Re-educated on the risks and benefits of Invega Sustenna, and side effects profile. Pt verbalized understanding.

## 2020-08-21 PROCEDURE — 99238 HOSP IP/OBS DSCHRG MGMT 30/<: CPT

## 2020-08-21 RX ORDER — NICOTINE POLACRILEX 2 MG
1 GUM BUCCAL
Qty: 0 | Refills: 0 | DISCHARGE
Start: 2020-08-21

## 2020-08-21 RX ORDER — PALIPERIDONE 1.5 MG/1
234 TABLET, EXTENDED RELEASE ORAL ONCE
Refills: 0 | Status: DISCONTINUED | OUTPATIENT
Start: 2020-08-21 | End: 2020-08-21

## 2020-08-21 RX ADMIN — Medication 50 MILLIGRAM(S): at 08:17

## 2020-08-21 RX ADMIN — PALIPERIDONE 234 MILLIGRAM(S): 1.5 TABLET, EXTENDED RELEASE ORAL at 12:54

## 2020-08-24 DIAGNOSIS — R45.851 SUICIDAL IDEATIONS: ICD-10-CM

## 2020-08-24 DIAGNOSIS — Z88.8 ALLERGY STATUS TO OTHER DRUGS, MEDICAMENTS AND BIOLOGICAL SUBSTANCES: ICD-10-CM

## 2020-08-24 DIAGNOSIS — F17.210 NICOTINE DEPENDENCE, CIGARETTES, UNCOMPLICATED: ICD-10-CM

## 2020-08-24 DIAGNOSIS — F14.10 COCAINE ABUSE, UNCOMPLICATED: ICD-10-CM

## 2020-08-24 DIAGNOSIS — Z91.5 PERSONAL HISTORY OF SELF-HARM: ICD-10-CM

## 2020-08-24 DIAGNOSIS — F25.9 SCHIZOAFFECTIVE DISORDER, UNSPECIFIED: ICD-10-CM

## 2020-08-24 DIAGNOSIS — F10.10 ALCOHOL ABUSE, UNCOMPLICATED: ICD-10-CM

## 2020-08-24 DIAGNOSIS — F32.9 MAJOR DEPRESSIVE DISORDER, SINGLE EPISODE, UNSPECIFIED: ICD-10-CM

## 2020-08-31 ENCOUNTER — EMERGENCY (EMERGENCY)
Facility: HOSPITAL | Age: 40
LOS: 1 days | Discharge: HOME | End: 2020-08-31
Attending: EMERGENCY MEDICINE | Admitting: EMERGENCY MEDICINE
Payer: MEDICARE

## 2020-08-31 VITALS
OXYGEN SATURATION: 99 % | HEART RATE: 58 BPM | RESPIRATION RATE: 18 BRPM | DIASTOLIC BLOOD PRESSURE: 82 MMHG | SYSTOLIC BLOOD PRESSURE: 123 MMHG | TEMPERATURE: 97 F

## 2020-08-31 DIAGNOSIS — F32.9 MAJOR DEPRESSIVE DISORDER, SINGLE EPISODE, UNSPECIFIED: ICD-10-CM

## 2020-08-31 DIAGNOSIS — F14.10 COCAINE ABUSE, UNCOMPLICATED: ICD-10-CM

## 2020-08-31 LAB
ALBUMIN SERPL ELPH-MCNC: 4.3 G/DL — SIGNIFICANT CHANGE UP (ref 3.3–5)
ALP SERPL-CCNC: 63 U/L — SIGNIFICANT CHANGE UP (ref 40–120)
ALT FLD-CCNC: 23 U/L — SIGNIFICANT CHANGE UP (ref 10–45)
ANION GAP SERPL CALC-SCNC: 9 MMOL/L — SIGNIFICANT CHANGE UP (ref 5–17)
APAP SERPL-MCNC: <5 UG/ML — LOW (ref 10–30)
AST SERPL-CCNC: 26 U/L — SIGNIFICANT CHANGE UP (ref 10–40)
BILIRUB SERPL-MCNC: 0.3 MG/DL — SIGNIFICANT CHANGE UP (ref 0.2–1.2)
BUN SERPL-MCNC: 12 MG/DL — SIGNIFICANT CHANGE UP (ref 7–23)
CALCIUM SERPL-MCNC: 9.5 MG/DL — SIGNIFICANT CHANGE UP (ref 8.4–10.5)
CHLORIDE SERPL-SCNC: 105 MMOL/L — SIGNIFICANT CHANGE UP (ref 96–108)
CO2 SERPL-SCNC: 25 MMOL/L — SIGNIFICANT CHANGE UP (ref 22–31)
CREAT SERPL-MCNC: 0.94 MG/DL — SIGNIFICANT CHANGE UP (ref 0.5–1.3)
ETHANOL SERPL-MCNC: <10 MG/DL — SIGNIFICANT CHANGE UP (ref 0–10)
GLUCOSE SERPL-MCNC: 94 MG/DL — SIGNIFICANT CHANGE UP (ref 70–99)
HCT VFR BLD CALC: 33.9 % — LOW (ref 34.5–45)
HGB BLD-MCNC: 10.9 G/DL — LOW (ref 11.5–15.5)
MCHC RBC-ENTMCNC: 29.4 PG — SIGNIFICANT CHANGE UP (ref 27–34)
MCHC RBC-ENTMCNC: 32.2 GM/DL — SIGNIFICANT CHANGE UP (ref 32–36)
MCV RBC AUTO: 91.4 FL — SIGNIFICANT CHANGE UP (ref 80–100)
NRBC # BLD: 0 /100 WBCS — SIGNIFICANT CHANGE UP (ref 0–0)
PCP SPEC-MCNC: SIGNIFICANT CHANGE UP
PLATELET # BLD AUTO: 235 K/UL — SIGNIFICANT CHANGE UP (ref 150–400)
POTASSIUM SERPL-MCNC: 3.8 MMOL/L — SIGNIFICANT CHANGE UP (ref 3.5–5.3)
POTASSIUM SERPL-SCNC: 3.8 MMOL/L — SIGNIFICANT CHANGE UP (ref 3.5–5.3)
PROT SERPL-MCNC: 7.2 G/DL — SIGNIFICANT CHANGE UP (ref 6–8.3)
RBC # BLD: 3.71 M/UL — LOW (ref 3.8–5.2)
RBC # FLD: 14.5 % — SIGNIFICANT CHANGE UP (ref 10.3–14.5)
SALICYLATES SERPL-MCNC: <0.3 MG/DL — LOW (ref 2.8–20)
SARS-COV-2 RNA SPEC QL NAA+PROBE: SIGNIFICANT CHANGE UP
SODIUM SERPL-SCNC: 139 MMOL/L — SIGNIFICANT CHANGE UP (ref 135–145)
WBC # BLD: 4.6 K/UL — SIGNIFICANT CHANGE UP (ref 3.8–10.5)
WBC # FLD AUTO: 4.6 K/UL — SIGNIFICANT CHANGE UP (ref 3.8–10.5)

## 2020-08-31 PROCEDURE — 36415 COLL VENOUS BLD VENIPUNCTURE: CPT

## 2020-08-31 PROCEDURE — 93010 ELECTROCARDIOGRAM REPORT: CPT

## 2020-08-31 PROCEDURE — 71045 X-RAY EXAM CHEST 1 VIEW: CPT | Mod: 26

## 2020-08-31 PROCEDURE — 80053 COMPREHEN METABOLIC PANEL: CPT

## 2020-08-31 PROCEDURE — 90792 PSYCH DIAG EVAL W/MED SRVCS: CPT

## 2020-08-31 PROCEDURE — 99285 EMERGENCY DEPT VISIT HI MDM: CPT | Mod: 25

## 2020-08-31 PROCEDURE — 85027 COMPLETE CBC AUTOMATED: CPT

## 2020-08-31 PROCEDURE — 84702 CHORIONIC GONADOTROPIN TEST: CPT

## 2020-08-31 PROCEDURE — 93005 ELECTROCARDIOGRAM TRACING: CPT | Mod: 76

## 2020-08-31 PROCEDURE — U0003: CPT

## 2020-08-31 PROCEDURE — 71045 X-RAY EXAM CHEST 1 VIEW: CPT

## 2020-08-31 PROCEDURE — 80307 DRUG TEST PRSMV CHEM ANLYZR: CPT

## 2020-08-31 RX ORDER — SODIUM CHLORIDE 9 MG/ML
1000 INJECTION INTRAMUSCULAR; INTRAVENOUS; SUBCUTANEOUS ONCE
Refills: 0 | Status: COMPLETED | OUTPATIENT
Start: 2020-08-31 | End: 2020-08-31

## 2020-08-31 RX ADMIN — SODIUM CHLORIDE 1000 MILLILITER(S): 9 INJECTION INTRAMUSCULAR; INTRAVENOUS; SUBCUTANEOUS at 08:50

## 2020-08-31 NOTE — ED ADULT NURSE NOTE - OBJECTIVE STATEMENT
pt to ED for SI. denies HI, plan for SI, denies sob, cp, dizziness, n/v/d, numbness, tingling, chance of pregnancy. pt reports alcohol and cocaine use yesterday morning.

## 2020-08-31 NOTE — ED BEHAVIORAL HEALTH ASSESSMENT NOTE - DESCRIPTION
reported history of seizures per prior chart: single, lives with mother in apartment. unemployed Pt has been calm, largely sleeping in the ED. She reported to ED team that she has been "feeling sad" for "a while."  She denied suicide attempt and endorsed SI from "time to time." She has been bradycardic and hypotensive at times, for which she received IV fluids. Bloodwork without acute abnormalities; negative serum alcohol. Urine tox not obtained.

## 2020-08-31 NOTE — ED BEHAVIORAL HEALTH ASSESSMENT NOTE - OTHER
unknown suspected substance intox. Unclear if adherent with outpatient care unable to assess unable to assess - fully under blanket moving all extremities - motor exam deferred due to COVID precautions gait not observed impaired based on reported SI and inability to participate in interview - assessment limited impaired based on limited ability to participate in interview, although apparently help-seeking for distressing symptoms sedated-appearing +vague depression and SI. No specified sx endorsed. No endorsed delusions or paranoia. No specific endorsed suicidal intent or plan unable to assess. Not obviously responding to internal stimuli

## 2020-08-31 NOTE — ED PROVIDER NOTE - NS ED ROS FT
Constitutional: No fever or chills.   Eyes: No pain, blurry vision, or discharge.  ENMT: No hearing changes, pain, discharge or infections. No neck pain or stiffness.  Cardiac: No chest pain, SOB or edema. No chest pain with exertion.  Respiratory: No cough or respiratory distress. No hemoptysis. No history of asthma or RAD.  GI: No nausea, vomiting, diarrhea or abdominal pain.  : No dysuria, frequency or burning.  MS: No myalgia, muscle weakness, joint pain or back pain.  Neuro: No headache or weakness. No LOC.  Skin: No skin rash.   Psych:  + Depressed

## 2020-08-31 NOTE — ED PROVIDER NOTE - OBJECTIVE STATEMENT
40 year old female presents to ED with concern for depressed mood and "feeling sad" for "a while."  Patient denies suicide attempt and states she feels suicidal from "time to time."  She denies associated fever, chills, chest pain, shortness of breath, abdominal pain, nausea, emesis, changes to bowel movements, rashes, recent travel, known sick contacts or any additional acute complaints or concerns at this time.

## 2020-08-31 NOTE — ED BEHAVIORAL HEALTH ASSESSMENT NOTE - PSYCHIATRIC ISSUES AND PLAN (INCLUDE STANDING AND PRN MEDICATION)
may offer risperidone 1mg BID for acute agitation or haloperidol 5mg IM Q6H PRN fo acute agitation posing a risk to self/others, in the case oral medication refused ADDENDUM 250PM: may offer risperidone 1mg BID for acute agitation or olanzapine 5mg IM Q6H PRN fo acute agitation posing a risk to self/others, in the case oral medication refused (note allergy to haloperidol listed in EMR). Do not combine parental olanzapine with parental benzodiazpine

## 2020-08-31 NOTE — ED PROVIDER NOTE - CLINICAL SUMMARY MEDICAL DECISION MAKING FREE TEXT BOX
Patient presents to ED w concern for feeling depressed and stating she doesn't want to live.  No suicide attempt.  Poor historian.  Patient is medically cleared and has been evaluated by psych team.  Plan for voluntary admission.  Patient is aware of plan and in agreement.  Awaiting placement/bed availability at psychiatric facility. Patient presents to ED w concern for feeling depressed and stating she doesn't want to live.  No suicide attempt.  Poor historian.  Patient is medically cleared and has been evaluated by psych team.  Plan for voluntary admission.  Patient is aware of plan and in agreement.  Awaiting placement/bed availability at psychiatric facility.  Following completion of my shift, patient's care is handed over to oncoming physician for final disposition pending bed availability.  Patient is stable at time of transfer of care.

## 2020-08-31 NOTE — ED BEHAVIORAL HEALTH NOTE - BEHAVIORAL HEALTH NOTE
Psychiatry ED Reassessment    41 yo woman, domiciled with mother in apartment, unemployed, with a documented PPH of schizoaffective disorder v substance induced mood disorder, polysubstance use (alcohol, crack cocaine), multiple prior hospitalizations, currently on AOT, most recently discharged from [facility] 8/21 on Invega Sustenna, also recently admitted to [facility] in the end of July), documented previous suicide attempts, who presented to the ED with vague depressive symptoms and suicidal ideation in the setting of likely (unknown) substance intoxication. Pt initially evaluated this morning with limited assessment due to degree of lethargy. Seen this afternoon for re-evaluation. Per sitter at bedside, pt has been sleeping all day, no observed self-harm.    On re-evaluation, pt awoke to participate for evaluation with extensive encouragement. She was irritable, disoriented to time (thought middle of night), with organized though at times evasive thought process. She reported feeling "depressed and suicidal" for "a while" when asked re: time frame. She denied any attempts to harm herself. She reported difficulty sleeping for days. She initially denied any recent alcohol or drug use, then reported use of crack and alcohol yesterday with prompting. She denied hallucinations. Denied HI.    Pt voiced concern that her "sadness" has not been treated with medication and that she needs a period of observation in the hospital to find the right medication to treat her mood. She expressed belief that she keeps receiving risperidone and it is not the right medicine for her. She expressed concern that her outpatient psychiatrist doesn't treat her depression, so has not been attending appointments. She denied possibility that her drug and alcohol use could be a problem or impacting her mood. She did not feel safe going home and asked to be psychiatrically admitted.    Collateral obtained by Nicol Garner, psychology intern, from pt's mother and  as below:    Spoke with pt's , Ms. Cole ([phone]), who has been working with pt since 7/1/20. Ms. Cole stated that most recent contact with pt was one week ago (8/24/20) when Ms. Cole went to pt's mother's home to see pt. They met for 15-20 minutes and reportedly, pt was very resistant to any kind of treatment, not engaged, and didn't communicate during meeting. Ms. Cole stated that pt said she used crack cocaine prior to their meeting. Pt lives with mother most of the time but they frequently, have disagreements and pt leaves for weeks at a time. Ms. Cole thinks pt needs to be in a rehab and inquired into whether it is possible to mandate her to get substance abuse treatment. She also said that she thinks that pt is using the ER to sleep and rest after she uses drugs and has no place to go, because if she goes home to her mother while intoxicated they will get into conflict. Ms. Cole asked for us to let her know when/if pt will get discharged from [facility] and requested that the discharge summary be emailed to her (email: [email or web address]) or if it cannot be emailed then faxed to her (fax #: [phone]).      Also spoke with pt's mother, Paula ([phone]), who did not know pt was admitted to ED. Pt's mother stated that she last saw pt two days ago (Saturday). Reportedly, pt stayed with mother for a few days after last inpatient discharge, then left home for a few days, and then came back this past Friday and stayed the night. Pt's mother told pt she has to leave when pt smoked crack cocaine at mother's house on Saturday. Pt does not live with mother but stays with her frequently. Pt's mother stated that when pt is not at mother's home she is homeless, per mother: "living on the street smoking crack." Pt's mother was very upset that pt keeps being released from the hospital and stated that she needs long-term hospitalization. She reported that she is concerned about pt hurting herself, stating that pt tried to kill herself twice this year and five times last year (though she was vague with details and unable to say anything about suicide attempts). Pt's mother also stated that pt has been arrested 18 times.     ASSESSMENT: concern for substance-induced mood disorder, cocaine and alcohol use disorders, currently unable to tolerate mood symptoms and seeking help for suicidal ideation without intent or plan. Appropriate for voluntary psychiatric admission for safety and stabilization, consideration for antidepressant medication, inpatient substance treatment. In long-term would most benefit from engagement in substance treatment, currently with very poor insight into problematic use.    RECOMMENDATIONS  -continue sitter for safety  -plan for voluntary psychiatric admission - 9.13 paperwork completed with patient, pending identification of accepting facility  -urine toxicology when able to obtain  -observe mood as substances metabolized  -Invega Sustenna given 8/21; monitor for indication for antidepressant  -collateral from outpatient psychiatrist as able to obtain  -substance counseling as able to participate  d/w ED team

## 2020-08-31 NOTE — ED BEHAVIORAL HEALTH ASSESSMENT NOTE - HPI (INCLUDE ILLNESS QUALITY, SEVERITY, DURATION, TIMING, CONTEXT, MODIFYING FACTORS, ASSOCIATED SIGNS AND SYMPTOMS)
39 yo woman, domiciled with mother in apartment, unemployed, PPH of schizoaffective disorder, polysubstance use (alcohol, crack cocaine), multiple prior hospitalizations, most recently discharged from [facility] 8/21, also recently admitted to [facility] in the end of July), reported history of Invega IM and AOT, previous suicide attempts, with remote PMH of seizure disorder per chart, who walked in reporting sadness. 39 yo woman, domiciled with mother in apartment, unemployed, documented PPH of schizoaffective disorder v substance induced mood disorder, polysubstance use (alcohol, crack cocaine), multiple prior hospitalizations, currently on AOT, most recently discharged from [facility] 8/21 on Invega Sustenna, also recently admitted to [facility] in the end of July), documented previous suicide attempts, with remote PMH of seizure disorder per chart, who walked into the ED this morning with complaint of depression and intermittent suicidal thoughts.     On evaluation mid-morning with Nicol Cole, psychology intern, pt found asleep, difficult to awaken, with quiet, slowed speech and poor eye contact. She stated that she is "depressed and suicidal" then immediately fell back asleep. With repeated attempts to engage, she stated that she lived "at home" but did not answer any further questions re: recent symptoms or any recent substance use.     Per review of records, pt admitted to [facility] 8/16-8/21/20 on voluntary status for treatment of self-reported depression and vague SI (plan to OD on pills, no attempts), also concern for substance-induced sx (urine toxicology positive for cocaine). Noted during admission with odd affect and evasiveness re: symptoms, with reported nonadherence with risperidone (with which treated during prior admission). Acute psychosis not observed per records and discharge dx substance induced mood d/o. Treated with risperidone and received Invega Sustenna 234mg IM prior to discharge. Referred to OP provider at [facility], Dr. Gandara ([phone]). Also noted with active AOT and , Ms. Cole ([phone]). 41 yo woman, domiciled with mother in apartment, unemployed, documented PPH of schizoaffective disorder v substance induced mood disorder, polysubstance use (alcohol, crack cocaine), multiple prior hospitalizations, currently on AOT, most recently discharged from [facility] 8/21 on Invega Sustenna, also recently admitted to [facility] in the end of July), documented previous suicide attempts, with remote PMH of seizure disorder per chart, who walked into the ED this morning with complaint of depression and intermittent suicidal thoughts.     On evaluation mid-morning with Nicol Cole, psychology intern, pt found asleep, difficult to awaken, with quiet, slowed speech and poor eye contact. She stated that she is "depressed and suicidal" then immediately fell back asleep. With repeated attempts to engage, she stated that she lived "at home" but did not answer any further questions re: recent symptoms or any recent substance use.     Per review of records, pt admitted to [facility] 8/16-8/21/20 on voluntary status for treatment of self-reported depression and vague SI (plan to OD on pills, no attempts), also concern for substance-induced sx (urine toxicology positive for cocaine). Noted during admission with odd affect and evasiveness re: symptoms, with reported nonadherence with risperidone (with which treated during prior admission). Acute psychosis not observed per records and discharge dx substance induced mood d/o. Treated with risperidone and received Invega Sustenna 234mg IM prior to discharge. Referred to OP provider at [facility], Dr. Gandara ([phone]). Also noted with active AOT and , Ms. Cole ([phone]).    Pending collateral from providers.    [facility] reviewed Reference #:[id number] - no record.

## 2020-08-31 NOTE — ED BEHAVIORAL HEALTH ASSESSMENT NOTE - SUMMARY
39 yo woman, domiciled with mother in apartment, unemployed, documented PPH of schizoaffective disorder v substance induced mood disorder, polysubstance use (alcohol, crack cocaine), multiple prior hospitalizations, currently on AOT, most recently discharged from [facility] 8/21 on Invega Sustenna, also recently admitted to [facility] in the end of July), documented previous suicide attempts, who presents with vague depressive symptoms and suicidal ideation in the setting of likely (unknown) substance intoxication.    Pt is currently sedated, likely due to substance intoxication, and is unable to meaningfully participate in interview. Based on available recent history, cocaine, alcohol, and ?other substance use is likely the cause of her current lethargy and a probable cause of her current mood symptoms and vague SI without endorsed intent or plan to harm self. Recommend trying to obtain urine toxicology. Will attempt to reach outpatient providers at [facility] for collateral information. Given mood complaints and suicidality, will need re-evaluation to determine appropriate psychiatric disposition. Not currently safe for discharge given reported SI and likely intox.    RECOMMENDATIONS  -continue 1:1 observation for safety  -attempt to obtain urine toxicology  -monitor for alcohol/BZD withdrawal  -will contact outpatient providers for collateral  -plan to re-evaluate safety and appropriate psychiatric disposition once collateral obtained and pt better able to participate 41 yo woman, domiciled with mother in apartment, unemployed, documented PPH of schizoaffective disorder v substance induced mood disorder, polysubstance use (alcohol, crack cocaine), multiple prior hospitalizations, currently on AOT, most recently discharged from [facility] 8/21 on Invega Sustenna, also recently admitted to [facility] in the end of July), documented previous suicide attempts, who presents with vague depressive symptoms and suicidal ideation in the setting of likely (unknown) substance intoxication.    Pt is currently sedated, likely due to substance intoxication, and is unable to meaningfully participate in interview. Based on available recent history, cocaine, alcohol, and ?other substance use is likely the cause of her current lethargy and a probable cause of her current mood symptoms and vague SI without endorsed intent or plan to harm self. Recommend trying to obtain urine toxicology. Will attempt to reach outpatient providers at [facility] and [facility] for collateral information. Given mood complaints and suicidality, will need re-evaluation to determine appropriate psychiatric disposition. Not currently safe for discharge given reported SI and likely intox.    RECOMMENDATIONS  -continue 1:1 observation for safety  -attempt to obtain urine toxicology  -monitor for alcohol/BZD withdrawal  -no standing psychotropic medication. Received Invega Sustenna (234mg IM Qmonthly) on 8/21  -will contact outpatient providers for collateral  -plan to re-evaluate safety and appropriate psychiatric disposition once collateral obtained and pt better able to participate 39 yo woman, domiciled with mother in apartment, unemployed, documented PPH of schizoaffective disorder v substance induced mood disorder, polysubstance use (alcohol, crack cocaine), multiple prior hospitalizations, currently on AOT, most recently discharged from [facility] 8/21 on Invega Sustenna, also recently admitted to [facility] in the end of July), documented previous suicide attempts, who presents with vague depressive symptoms and suicidal ideation in the setting of likely (unknown) substance intoxication.    Pt is currently sedated, likely due to substance intoxication, and is unable to meaningfully participate in interview. Based on available recent history, cocaine, alcohol, and ?other substance use is likely the cause of her current lethargy and a probable cause of her current mood symptoms and vague SI without endorsed intent or plan to harm self. Recommend trying to obtain urine toxicology. Will attempt to reach outpatient providers at [facility] and [facility] for collateral information. Given mood complaints and suicidality, will need re-evaluation to determine safety and appropriate psychiatric disposition. Not currently safe for discharge given reported SI and likely intox.    RECOMMENDATIONS  -continue 1:1 observation for safety  -attempt to obtain urine toxicology  -monitor for alcohol/BZD withdrawal  -no standing psychotropic medication. Received Invega Sustenna (234mg IM Qmonthly) on 8/21  -will contact outpatient providers for collateral  -plan to re-evaluate safety and appropriate psychiatric disposition once collateral obtained and pt better able to participate

## 2020-08-31 NOTE — ED BEHAVIORAL HEALTH ASSESSMENT NOTE - SUICIDE RISK FACTORS
Psychotic disorder current/past/Unable to engage in safety planning/Alcohol/Substance abuse disorders

## 2020-08-31 NOTE — ED BEHAVIORAL HEALTH ASSESSMENT NOTE - DESCRIPTION (FIRST USE, LAST USE, QUANTITY, FREQUENCY, DURATION)
per prior chart per prior records reported history of cocaine abuse, last documented in mid-August 2020

## 2020-08-31 NOTE — ED PROVIDER NOTE - DIAGNOSTIC INTERPRETATION
CXR read: The heart appears to be within normal limits in transverse diameter.  No acute infiltrates, effusions or evidence of pulmonary vascular congestion.  The chest wall and surrounding bony structures appear normal.  ED Physician: Dr. Verona Durand

## 2020-08-31 NOTE — ED BEHAVIORAL HEALTH ASSESSMENT NOTE - DETAILS
admitted to [facility] in August 2020 records reviewed in EMR pt reports vague SI unable to further assess reported history of seizures, ?if alcohol related fatigue self d/w ED Dr. Stratton re: plan to observe for safety and sobriety, obtain collateral and reassessment

## 2020-08-31 NOTE — ED PROVIDER NOTE - PROGRESS NOTE DETAILS
[place]: medically cleared and signed out to me pending psych placement. Pt currently sleeping comfortably. Will reassess. No accepting facility yet. [place]: still sleeping comfortably. Awaiting psych reassessment/placement. ashish: 40M c/o SI. medically cleared. psych consulted. reccs for voluntary admission. stable. pending placement. ashish: pt stable. s/o'd to dr valles pending placement. Pasha: Received s/o from Dr Patel @ 1a. Pending psych placement for voluntary admission. Medically cleared. No issues overnight.

## 2020-08-31 NOTE — ED BEHAVIORAL HEALTH ASSESSMENT NOTE - RISK ASSESSMENT
Asssessed at elevated acute risk for suicide given current endorsed SI and depression, concern for substance intoxication, with hx of substance abuse and recent psychiatric admission, unclear if adherent with outpatient care, unclear social supports. Unable to engage in safety planning High Acute Suicide Risk

## 2020-08-31 NOTE — ED BEHAVIORAL HEALTH ASSESSMENT NOTE - DIFFERENTIAL
Unspecified mood disorder, concern for substance-induced depressive d/o  Cocaine Use Disorder  Alcohol Use Disorder  r/o psychotic d/o

## 2020-08-31 NOTE — ED PROVIDER NOTE - PHYSICAL EXAMINATION
VITAL SIGNS: I have reviewed nursing notes and confirm.  CONSTITUTIONAL: Well-developed; well-nourished; in no acute distress.   SKIN:  warm and dry, no acute rash.   HEAD:  normocephalic, atraumatic.  EYES: PERRL.  EOM intact; conjunctiva and sclera clear.  ENT: No nasal discharge; airway clear.   NECK: Supple; non tender.  CARD: S1, S2 normal; no murmurs, gallops, or rubs. Regular rate and rhythm.   RESP:  Clear to auscultation b/l, no wheezes, rales or rhonchi.  ABD: Normal bowel sounds; soft; non-distended; non-tender; no guarding/rebound.  EXT: Normal ROM. No clubbing, cyanosis or edema. 2+ pulses to b/l ue/le.  NEURO: Alert, oriented, grossly unremarkable.  5/5 strength x 4 extremities against gravity and external force.  No drift x 4 extremities.  Sensation intact and symmetric x 4 extremities.  No facial asymmetry.    PSYCH: Cooperative, flat affect

## 2020-08-31 NOTE — ED BEHAVIORAL HEALTH ASSESSMENT NOTE - OTHER PAST PSYCHIATRIC HISTORY (INCLUDE DETAILS REGARDING ONSET, COURSE OF ILLNESS, INPATIENT/OUTPATIENT TREATMENT)
Documented history of schizoaffective disorder, cocaine and alcohol abuse, many past psychiatric admissions, currently with active AOT order, seen as outpatient at [facility].  On Invega Sustenna from last psychiatric admission to [facility] in August.  Documented history of suicide attempt

## 2020-09-01 VITALS
RESPIRATION RATE: 17 BRPM | HEART RATE: 45 BPM | TEMPERATURE: 98 F | DIASTOLIC BLOOD PRESSURE: 66 MMHG | OXYGEN SATURATION: 100 % | SYSTOLIC BLOOD PRESSURE: 117 MMHG

## 2020-09-01 PROCEDURE — 99283 EMERGENCY DEPT VISIT LOW MDM: CPT

## 2020-09-01 NOTE — ED ADULT NURSE REASSESSMENT NOTE - NS ED NURSE REASSESS COMMENT FT1
"I'm allergic to Haldol but I overcame"  - pt
Patient continues to sleep while being monitored by constant observation.  Patient bp and pulse has normalized going up to 115/67, 50 after fluids, but bp and pulse fluctuates.  Patient was not able to provide Psych MD with any information.  Psych MD will speak with patient's doctor for collateral.
Patient observed sleeping in bed. Patient bp 81/50 then administered IV Saline by ED RN. Patient is in good behavioral control.
Received pt from Night RN Arianna, pt resting comfortably in ED OBGYN room, in NAD, 1:1 in place for patient and staff safety.
Received pt from overnight RN. Pt with +SI, no plan in place. Pt drowsy, unwilling to participate in assessment at this time. Pt hypotensive, (SBP 81). Pt appears stable, L 20g PIV placed, 1L IVF infusing. Will continue to monitor. 1:1 obs at bedside maintained.
pt denies chance of pregnancy.
Awaiting for psych assignment.

## 2020-09-01 NOTE — ED BEHAVIORAL HEALTH NOTE - BEHAVIORAL HEALTH NOTE
Per ED provider, patient has been sleeping most of the time, she is asleep on evaluation, visualized on camera, no noted acute distress.      39 yo woman, domiciled with mother in apartment, unemployed, with a documented PPH of schizoaffective disorder v substance induced mood disorder, polysubstance use (alcohol, crack cocaine), multiple prior hospitalizations, currently on AOT, most recently discharged from [facility] 8/21 on Invega Sustenna, also recently admitted to [facility] [place], operated by [facility] in the end of July), documented previous suicide attempts, who presented to the ED with vague depressive symptoms and suicidal ideation in the setting of likely (unknown) substance intoxication. Pt initially evaluated this morning with limited assessment due to degree of lethargy. Seen this afternoon for re-evaluation. Per sitter at bedside, pt has been sleeping all day, no observed self-harm.    ASSESSMENT: concern for substance-induced mood disorder, cocaine and alcohol use disorders, currently unable to tolerate mood symptoms and seeking help for suicidal ideation without intent or plan. Appropriate for voluntary psychiatric admission for safety and stabilization, consideration for antidepressant medication, inpatient substance treatment. In long-term would most benefit from engagement in substance treatment, currently with very poor insight into problematic use.    RECOMMENDATIONS  -continue 1:1 for safety  -plan for voluntary psychiatric admission - 9.13 paperwork completed by [facility] day psych team with patient, pending bed availability  -Invega Sustenna given 8/21; monitor for indication for antidepressant  -collateral from outpatient psychiatrist as able to obtain  -substance counseling as able to participate  d/w ED team.  - Provide Haldol 5mg q 6 hours PRN for agitation (IM or p.o.), Ativan 2mg q 6 hours PRN for agitation (IM or p.o.), Benadryl 50mg q 6 hours PRN for agitation (IM or p.o.)   -refer to  assessment for more details.

## 2020-09-01 NOTE — ED ADULT NURSE REASSESSMENT NOTE - DESCRIPTION
Pt slept throughout. Regular, unlabored respirations. Pt ate a sandwich for dinner, tolerated well. Calm, cooperative. Denied pain, discomfort.

## 2020-09-01 NOTE — ED BEHAVIORAL HEALTH NOTE - BEHAVIORAL HEALTH NOTE
Psychiatry ED Reassessment    39 yo woman, domiciled with mother in apartment, unemployed, with a documented PPH of schizoaffective disorder v substance induced mood disorder, polysubstance use (alcohol, crack cocaine), multiple prior hospitalizations, currently on AOT, most recently discharged from [facility] 8/21 on Invega Sustenna, also recently admitted to [facility] in the end of July), documented previous suicide attempts, who presented to the ED on 8/31 with vague depressive symptoms and suicidal ideation in the setting of likely (unknown) substance intoxication/withdrawal. Pt was initially too lethargic for evaluation; on re-evaluation yesterday afternoon pt endorsed disabling depression and passive suicidal thoughts, seeking psychaitric admission for stabilization on medication. She endorsed recent cocaine and alcohol use with very poor insight into likely contribution to her mood symptoms. Planned for voluntary psychiatric admission pending identification of accepting facility. Seen today for re-evaluation.     On evaluation this AM, pt found asleep but easily awakened, calm, endorsing ongoing pervasive sadness and thoughts about suicide without intent or plan. She stated "I don't want to kill myself but I need help".    ASSESSMENT: concern for substance-induced mood disorder with mild improvement today, cocaine and alcohol use disorders. remains unable to tolerate mood symptoms and seeking help for suicidal ideation without intent or plan. Appropriate for voluntary psychiatric admission for safety and stabilization, consideration for antidepressant medication, inpatient substance treatment. In long-term would most benefit from engagement in substance treatment, currently with very poor insight into problematic use.    RECOMMENDATIONS  -continue sitter for safety  -plan for voluntary psychiatric admission - 9.13 paperwork completed 8/31 with patient, pending identification of accepting facility  -observe mood as substances metabolized  -Invega Sustenna given 8/21; monitor for indication for antidepressant  -collateral from outpatient psychiatrist as able to obtain  -substance counseling as able to participate  d/w ED team. Psychiatry ED Reassessment    41 yo woman, domiciled with mother in apartment, unemployed, with a documented PPH of schizoaffective disorder v substance induced mood disorder, polysubstance use (alcohol, crack cocaine), multiple prior hospitalizations, currently on AOT, most recently discharged from [facility] 8/21 on Invega Sustenna, also recently admitted to [facility] in the end of July), documented previous suicide attempts, who presented to the ED on 8/31 with vague depressive symptoms and suicidal ideation in the setting of suspected substance intoxication/withdrawal. Pt was initially too lethargic for evaluation; on re-evaluation yesterday afternoon pt endorsed disabling depression and passive suicidal thoughts, seeking psychiatric admission for stabilization on medication. She endorsed recent cocaine and alcohol use with very poor insight into likely contribution to her mood symptoms. Planned for voluntary psychiatric admission pending identification of accepting facility. Seen today for re-evaluation.     On evaluation this AM, pt found asleep but easily awakened, calm, endorsing ongoing pervasive sadness and thoughts about suicide without intent or plan. She stated "I just don't feel good" and "I don't want to kill myself but I need help". She became upset when cocaine use discussed, stating "I don't use drugs" despite utox +cocaine and discussion re: recent use yesterday. She was unable to engage in a discussion re: safety planning. She declined to have her mother contacted. She voiced ongoing preference for psychiatric admission to address her depressive symptoms.    MSE: well-developed, disheveled-appearing Black woman, dressed in hospital attire, lying under covers. Fair eye contact, behavior calm, no psychomotor agitation/retardation, no tics or tremors observed. Speech regular rate/rhythm/volume. Mood "depressed", affect congruent. Thought process grossly coherent but vague, at times illogical. Thought content +vague depressive content and passive SI, no voiced delusions or paranoia, no reported AVH, does not appear internally preoccupied. no HI. Normal associations. Insight into need for psychiatric treatment fair, insight into drug use poor. Judgment impaired based on recurrent substance abuse but seeking help for distressing symptoms.    ASSESSMENT: concern for substance-induced mood disorder with mild improvement today, cocaine and alcohol use disorders. remains unable to tolerate mood symptoms and seeking help for suicidal ideation without intent or plan. Appropriate for voluntary psychiatric admission for safety and stabilization, consideration for antidepressant medication, inpatient substance treatment. In long-term would most benefit from engagement in substance treatment, currently with very poor insight into problematic use.    RECOMMENDATIONS  -continue sitter for safety  -plan for voluntary psychiatric admission - 9.13 paperwork completed 8/31 with patient, pending identification of accepting facility  -observe mood as substances metabolized  -Invega Sustenna given 8/21; monitor for indication for antidepressant  -offer risperidone 1mg BID PRN for acute agitation or olanzapine 5mg IM Q6H PRN fo acute agitation posing a risk to self/others, in the case oral medication refused (note allergy to haloperidol listed in EMR). Do not combine parental olanzapine with parental benzodiazepine  -collateral from outpatient psychiatrist as able to obtain  -substance counseling as able to participate  d/w ED SW. Psychiatry ED Reassessment    41 yo woman, domiciled with mother in apartment, unemployed, with a documented PPH of schizoaffective disorder v substance induced mood disorder, polysubstance use (alcohol, crack cocaine), multiple prior hospitalizations, currently on AOT, most recently discharged from [facility] 8/21 on Invega Sustenna, also recently admitted to [facility] in the end of July), documented previous suicide attempts, who presented to the ED on 8/31 with vague depressive symptoms and suicidal ideation in the setting of suspected substance intoxication/withdrawal. Pt was initially too lethargic for evaluation; on re-evaluation yesterday afternoon pt endorsed disabling depression and passive suicidal thoughts, seeking psychiatric admission for stabilization on medication. She endorsed recent cocaine and alcohol use with very poor insight into likely contribution to her mood symptoms. Planned for voluntary psychiatric admission pending identification of accepting facility. Seen today for re-evaluation.     On evaluation this AM, pt found asleep but easily awakened, calm, endorsing ongoing pervasive sadness and thoughts about suicide without intent or plan. She stated "I just don't feel good" and "I don't want to kill myself but I need help". She became upset when cocaine use discussed, stating "I don't use drugs" despite utox +cocaine and discussion re: recent use yesterday. She was unable to engage in a discussion re: safety planning. She declined to have her mother contacted. She voiced ongoing preference for psychiatric admission to address her depressive symptoms.    MSE: well-developed, disheveled-appearing Black woman, dressed in hospital attire, lying under covers. Fair eye contact, behavior calm, no psychomotor agitation/retardation, no tics or tremors observed. Speech regular rate/rhythm/volume. Mood "depressed", affect congruent. Thought process grossly coherent but vague, at times illogical. Thought content +vague depressive content and passive SI, no voiced delusions or paranoia, no reported AVH, does not appear internally preoccupied. no HI. Normal associations. Insight into need for psychiatric treatment fair, insight into drug use poor. Judgment impaired based on recurrent substance abuse but seeking help for distressing symptoms.    ASSESSMENT: concern for substance-induced mood disorder with mild improvement today, cocaine and alcohol use disorders. remains unable to tolerate mood symptoms and seeking help for suicidal ideation without intent or plan. Appropriate for voluntary psychiatric admission for safety and stabilization, consideration for antidepressant medication, inpatient substance treatment. In long-term would most benefit from engagement in substance treatment, currently with very poor insight into problematic use.    RECOMMENDATIONS  -continue sitter for safety  -plan for voluntary psychiatric admission - 9.13 paperwork completed 8/31 with patient, pending identification of accepting facility  -observe mood as substances metabolized  -Invega Sustenna given 8/21; monitor for indication for antidepressant  -offer risperidone 1mg BID PRN for acute agitation or olanzapine 5mg IM Q6H PRN fo acute agitation posing a risk to self/others, in the case oral medication refused (note allergy to haloperidol listed in EMR). Do not combine parental olanzapine with parental benzodiazepine  -collateral from outpatient psychiatrist as able to obtain  -substance counseling as able to participate  d/w ED LUANN.    ADDENDUM 10:50AM    Contacted by ED LUANN and MD that pt has declined available inpatient psychiatric bed on [place] and is now requesting discharge from hospital. No further voiced SI. No agitation in ED.  Pt seen for reassessment - found awake, alert, with organized though at times vague thought process, repeatedly stating "just get me my things" and "I don't want to leave [place]". She reported feeling better though still "sad", without any voiced SI, intent, or plan to harm herself. States "I don't want to die". She stated that if she needs to go to [place] to a psychiatric unit, she would rather be discharged from the hospital. She endorsed plan to return to her mother's home and stated that she would see her outpatient psychiatrist at [facility] "today or tomorrow". She declined further discussion regarding drug use or rehab options.     At this time, suspect resolving substance-induced mood symptoms as cocaine metabolized, also with likely component of malingering for shelter in hospital. No acute safety concerns that would warrant involuntary inpatient psychiatric hospitalization or clear benefit from voluntary psychiatric admission given multiple recent admissions and suspected primary substance use disorder. Strongly encourage engagement in outpatient care particularly substance treatment. Will attempt to reach AOT  and [facility] psychiatrist to convey discharge plan. Pt is psychiatrically cleared for discharge. Psychiatry ED Reassessment    41 yo woman, domiciled with mother in apartment, unemployed, with a documented PPH of schizoaffective disorder v substance induced mood disorder, polysubstance use (alcohol, crack cocaine), multiple prior hospitalizations, currently on AOT, most recently discharged from [facility] 8/21 on Invega Sustenna, also recently admitted to [facility] in the end of July), documented previous suicide attempts, who presented to the ED on 8/31 with vague depressive symptoms and suicidal ideation in the setting of suspected substance intoxication/withdrawal. Pt was initially too lethargic for evaluation; on re-evaluation yesterday afternoon pt endorsed disabling depression and passive suicidal thoughts, seeking psychiatric admission for stabilization on medication. She endorsed recent cocaine and alcohol use with very poor insight into likely contribution to her mood symptoms. Planned for voluntary psychiatric admission pending identification of accepting facility. Seen today for re-evaluation.     On evaluation this AM, pt found asleep but easily awakened, calm, endorsing ongoing pervasive sadness and thoughts about suicide without intent or plan. She stated "I just don't feel good" and "I don't want to kill myself but I need help". She became upset when cocaine use discussed, stating "I don't use drugs" despite utox +cocaine and discussion re: recent use yesterday. She was unable to engage in a discussion re: safety planning. She declined to have her mother contacted. She voiced ongoing preference for psychiatric admission to address her depressive symptoms.    MSE: well-developed, disheveled-appearing Black woman, dressed in hospital attire, lying under covers. Fair eye contact, behavior calm, no psychomotor agitation/retardation, no tics or tremors observed. Speech regular rate/rhythm/volume. Mood "depressed", affect congruent. Thought process grossly coherent but vague, at times illogical. Thought content +vague depressive content and passive SI, no voiced delusions or paranoia, no reported AVH, does not appear internally preoccupied. no HI. Normal associations. Insight into need for psychiatric treatment fair, insight into drug use poor. Judgment impaired based on recurrent substance abuse but seeking help for distressing symptoms.    ASSESSMENT: concern for substance-induced mood disorder with mild improvement today, cocaine and alcohol use disorders. remains unable to tolerate mood symptoms and seeking help for suicidal ideation without intent or plan. Appropriate for voluntary psychiatric admission for safety and stabilization, consideration for antidepressant medication, inpatient substance treatment. In long-term would most benefit from engagement in substance treatment, currently with very poor insight into problematic use.    RECOMMENDATIONS  -continue sitter for safety  -plan for voluntary psychiatric admission - 9.13 paperwork completed 8/31 with patient, pending identification of accepting facility  -observe mood as substances metabolized  -Invega Sustenna given 8/21; monitor for indication for antidepressant  -offer risperidone 1mg BID PRN for acute agitation or olanzapine 5mg IM Q6H PRN fo acute agitation posing a risk to self/others, in the case oral medication refused (note allergy to haloperidol listed in EMR). Do not combine parental olanzapine with parental benzodiazepine  -collateral from outpatient psychiatrist as able to obtain  -substance counseling as able to participate  d/w ED SW.    ADDENDUM 10:50AM    Contacted by ED SW and MD that pt has declined available inpatient psychiatric bed on [place] and is now requesting discharge from hospital. No further voiced SI. No agitation in ED.  Pt seen for reassessment - found awake, alert, with organized though at times vague thought process, repeatedly stating "just get me my things" and "I don't want to leave [place]". She reported feeling better though still "sad", without any voiced SI, intent, or plan to harm herself. States "I don't want to die"; no access to weapons. She stated that if she needs to go to [place] to a psychiatric unit, she would rather be discharged from the hospital. She endorsed plan to return to her mother's home and stated that she would see her outpatient psychiatrist at [facility] "today or tomorrow". She declined further discussion regarding drug use or rehab options.     At this time, suspect resolving substance-induced mood symptoms as cocaine metabolized, also with likely component of malingering for shelter in hospital. No acute safety concerns that would warrant involuntary inpatient psychiatric hospitalization or clear benefit from voluntary psychiatric admission given multiple recent admissions and suspected primary substance use disorder. Strongly encourage engagement in outpatient care particularly substance treatment. Spoke with pt's AOT , MsRigoberto  ([phone]) to discuss discharge plan. Unable to reach Dr. Gandara at [facility] (number listed [phone] is for another provider and alternative number [phone] with no answer).   -Pt is psychiatrically cleared for discharge.  -please email/fax discharge papers to Ms. Cole at [facility] email: [email or web address] or fax #: [phone]).

## 2020-09-04 DIAGNOSIS — R45.851 SUICIDAL IDEATIONS: ICD-10-CM

## 2020-09-04 DIAGNOSIS — Z88.8 ALLERGY STATUS TO OTHER DRUGS, MEDICAMENTS AND BIOLOGICAL SUBSTANCES: ICD-10-CM

## 2020-09-04 DIAGNOSIS — Z20.828 CONTACT WITH AND (SUSPECTED) EXPOSURE TO OTHER VIRAL COMMUNICABLE DISEASES: ICD-10-CM

## 2020-09-04 DIAGNOSIS — F32.9 MAJOR DEPRESSIVE DISORDER, SINGLE EPISODE, UNSPECIFIED: ICD-10-CM

## 2020-12-23 ENCOUNTER — EMERGENCY (EMERGENCY)
Facility: HOSPITAL | Age: 40
LOS: 1 days | Discharge: HOME | End: 2020-12-23
Attending: EMERGENCY MEDICINE | Admitting: EMERGENCY MEDICINE
Payer: MEDICARE

## 2020-12-23 ENCOUNTER — INPATIENT (INPATIENT)
Facility: HOSPITAL | Age: 40
LOS: 6 days | Discharge: HOME | End: 2020-12-30
Attending: PSYCHIATRY & NEUROLOGY | Admitting: PSYCHIATRY & NEUROLOGY
Payer: MEDICARE

## 2020-12-23 VITALS
OXYGEN SATURATION: 100 % | HEART RATE: 76 BPM | DIASTOLIC BLOOD PRESSURE: 72 MMHG | SYSTOLIC BLOOD PRESSURE: 111 MMHG | TEMPERATURE: 99 F | RESPIRATION RATE: 18 BRPM

## 2020-12-23 VITALS
HEART RATE: 99 BPM | RESPIRATION RATE: 18 BRPM | TEMPERATURE: 97 F | DIASTOLIC BLOOD PRESSURE: 80 MMHG | HEIGHT: 68 IN | OXYGEN SATURATION: 98 % | SYSTOLIC BLOOD PRESSURE: 135 MMHG

## 2020-12-23 DIAGNOSIS — Z88.2 ALLERGY STATUS TO SULFONAMIDES: ICD-10-CM

## 2020-12-23 DIAGNOSIS — R45.851 SUICIDAL IDEATIONS: ICD-10-CM

## 2020-12-23 DIAGNOSIS — F32.9 MAJOR DEPRESSIVE DISORDER, SINGLE EPISODE, UNSPECIFIED: ICD-10-CM

## 2020-12-23 DIAGNOSIS — Z20.828 CONTACT WITH AND (SUSPECTED) EXPOSURE TO OTHER VIRAL COMMUNICABLE DISEASES: ICD-10-CM

## 2020-12-23 LAB
ALBUMIN SERPL ELPH-MCNC: 4.6 G/DL — SIGNIFICANT CHANGE UP (ref 3.3–5)
ALP SERPL-CCNC: 67 U/L — SIGNIFICANT CHANGE UP (ref 40–120)
ALT FLD-CCNC: 24 U/L — SIGNIFICANT CHANGE UP (ref 10–45)
AMPHET UR-MCNC: NEGATIVE — SIGNIFICANT CHANGE UP
ANION GAP SERPL CALC-SCNC: 12 MMOL/L — SIGNIFICANT CHANGE UP (ref 5–17)
APAP SERPL-MCNC: <5 UG/ML — LOW (ref 10–30)
APPEARANCE UR: CLEAR — SIGNIFICANT CHANGE UP
AST SERPL-CCNC: 22 U/L — SIGNIFICANT CHANGE UP (ref 10–40)
BACTERIA # UR AUTO: PRESENT /HPF
BARBITURATES UR SCN-MCNC: NEGATIVE — SIGNIFICANT CHANGE UP
BASOPHILS # BLD AUTO: 0.02 K/UL — SIGNIFICANT CHANGE UP (ref 0–0.2)
BASOPHILS NFR BLD AUTO: 0.5 % — SIGNIFICANT CHANGE UP (ref 0–2)
BENZODIAZ UR-MCNC: NEGATIVE — SIGNIFICANT CHANGE UP
BILIRUB SERPL-MCNC: 0.6 MG/DL — SIGNIFICANT CHANGE UP (ref 0.2–1.2)
BILIRUB UR-MCNC: NEGATIVE — SIGNIFICANT CHANGE UP
BUN SERPL-MCNC: 14 MG/DL — SIGNIFICANT CHANGE UP (ref 7–23)
CALCIUM SERPL-MCNC: 10 MG/DL — SIGNIFICANT CHANGE UP (ref 8.4–10.5)
CHLORIDE SERPL-SCNC: 102 MMOL/L — SIGNIFICANT CHANGE UP (ref 96–108)
CO2 SERPL-SCNC: 27 MMOL/L — SIGNIFICANT CHANGE UP (ref 22–31)
COCAINE METAB.OTHER UR-MCNC: POSITIVE
COLOR SPEC: YELLOW — SIGNIFICANT CHANGE UP
CREAT SERPL-MCNC: 0.96 MG/DL — SIGNIFICANT CHANGE UP (ref 0.5–1.3)
DIFF PNL FLD: NEGATIVE — SIGNIFICANT CHANGE UP
EOSINOPHIL # BLD AUTO: 0.15 K/UL — SIGNIFICANT CHANGE UP (ref 0–0.5)
EOSINOPHIL NFR BLD AUTO: 4 % — SIGNIFICANT CHANGE UP (ref 0–6)
EPI CELLS # UR: ABNORMAL /HPF (ref 0–5)
ETHANOL SERPL-MCNC: <10 MG/DL — SIGNIFICANT CHANGE UP (ref 0–10)
GLUCOSE SERPL-MCNC: 95 MG/DL — SIGNIFICANT CHANGE UP (ref 70–99)
GLUCOSE UR QL: NEGATIVE — SIGNIFICANT CHANGE UP
HCT VFR BLD CALC: 33.7 % — LOW (ref 34.5–45)
HGB BLD-MCNC: 10.8 G/DL — LOW (ref 11.5–15.5)
IMM GRANULOCYTES NFR BLD AUTO: 0.3 % — SIGNIFICANT CHANGE UP (ref 0–1.5)
KETONES UR-MCNC: NEGATIVE — SIGNIFICANT CHANGE UP
LEUKOCYTE ESTERASE UR-ACNC: NEGATIVE — SIGNIFICANT CHANGE UP
LYMPHOCYTES # BLD AUTO: 1.64 K/UL — SIGNIFICANT CHANGE UP (ref 1–3.3)
LYMPHOCYTES # BLD AUTO: 44 % — SIGNIFICANT CHANGE UP (ref 13–44)
MCHC RBC-ENTMCNC: 28.3 PG — SIGNIFICANT CHANGE UP (ref 27–34)
MCHC RBC-ENTMCNC: 32 GM/DL — SIGNIFICANT CHANGE UP (ref 32–36)
MCV RBC AUTO: 88.2 FL — SIGNIFICANT CHANGE UP (ref 80–100)
METHADONE UR-MCNC: NEGATIVE — SIGNIFICANT CHANGE UP
MONOCYTES # BLD AUTO: 0.36 K/UL — SIGNIFICANT CHANGE UP (ref 0–0.9)
MONOCYTES NFR BLD AUTO: 9.7 % — SIGNIFICANT CHANGE UP (ref 2–14)
NEUTROPHILS # BLD AUTO: 1.55 K/UL — LOW (ref 1.8–7.4)
NEUTROPHILS NFR BLD AUTO: 41.5 % — LOW (ref 43–77)
NITRITE UR-MCNC: NEGATIVE — SIGNIFICANT CHANGE UP
NRBC # BLD: 0 /100 WBCS — SIGNIFICANT CHANGE UP (ref 0–0)
OPIATES UR-MCNC: NEGATIVE — SIGNIFICANT CHANGE UP
PCP SPEC-MCNC: SIGNIFICANT CHANGE UP
PCP UR-MCNC: NEGATIVE — SIGNIFICANT CHANGE UP
PH UR: 6 — SIGNIFICANT CHANGE UP (ref 5–8)
PLATELET # BLD AUTO: 284 K/UL — SIGNIFICANT CHANGE UP (ref 150–400)
POTASSIUM SERPL-MCNC: 3.8 MMOL/L — SIGNIFICANT CHANGE UP (ref 3.5–5.3)
POTASSIUM SERPL-SCNC: 3.8 MMOL/L — SIGNIFICANT CHANGE UP (ref 3.5–5.3)
PROT SERPL-MCNC: 7.5 G/DL — SIGNIFICANT CHANGE UP (ref 6–8.3)
PROT UR-MCNC: ABNORMAL MG/DL
RBC # BLD: 3.82 M/UL — SIGNIFICANT CHANGE UP (ref 3.8–5.2)
RBC # FLD: 14.9 % — HIGH (ref 10.3–14.5)
RBC CASTS # UR COMP ASSIST: < 5 /HPF — SIGNIFICANT CHANGE UP
SALICYLATES SERPL-MCNC: <0.3 MG/DL — LOW (ref 2.8–20)
SARS-COV-2 RNA SPEC QL NAA+PROBE: SIGNIFICANT CHANGE UP
SODIUM SERPL-SCNC: 141 MMOL/L — SIGNIFICANT CHANGE UP (ref 135–145)
SP GR SPEC: >=1.03 — SIGNIFICANT CHANGE UP (ref 1–1.03)
THC UR QL: NEGATIVE — SIGNIFICANT CHANGE UP
TSH SERPL-MCNC: 1.02 UIU/ML — SIGNIFICANT CHANGE UP (ref 0.35–4.94)
UROBILINOGEN FLD QL: 1 E.U./DL — SIGNIFICANT CHANGE UP
WBC # BLD: 3.73 K/UL — LOW (ref 3.8–10.5)
WBC # FLD AUTO: 3.73 K/UL — LOW (ref 3.8–10.5)
WBC UR QL: < 5 /HPF — SIGNIFICANT CHANGE UP

## 2020-12-23 PROCEDURE — 90792 PSYCH DIAG EVAL W/MED SRVCS: CPT | Mod: 95

## 2020-12-23 PROCEDURE — 80053 COMPREHEN METABOLIC PANEL: CPT

## 2020-12-23 PROCEDURE — 99285 EMERGENCY DEPT VISIT HI MDM: CPT

## 2020-12-23 PROCEDURE — 81025 URINE PREGNANCY TEST: CPT

## 2020-12-23 PROCEDURE — 80307 DRUG TEST PRSMV CHEM ANLYZR: CPT

## 2020-12-23 PROCEDURE — 85025 COMPLETE CBC W/AUTO DIFF WBC: CPT

## 2020-12-23 PROCEDURE — 86769 SARS-COV-2 COVID-19 ANTIBODY: CPT

## 2020-12-23 PROCEDURE — 93010 ELECTROCARDIOGRAM REPORT: CPT

## 2020-12-23 PROCEDURE — 84443 ASSAY THYROID STIM HORMONE: CPT

## 2020-12-23 PROCEDURE — 93005 ELECTROCARDIOGRAM TRACING: CPT

## 2020-12-23 PROCEDURE — 36415 COLL VENOUS BLD VENIPUNCTURE: CPT

## 2020-12-23 PROCEDURE — 87635 SARS-COV-2 COVID-19 AMP PRB: CPT

## 2020-12-23 PROCEDURE — 81001 URINALYSIS AUTO W/SCOPE: CPT

## 2020-12-23 PROCEDURE — 99283 EMERGENCY DEPT VISIT LOW MDM: CPT

## 2020-12-23 RX ORDER — QUETIAPINE FUMARATE 200 MG/1
100 TABLET, FILM COATED ORAL ONCE
Refills: 0 | Status: COMPLETED | OUTPATIENT
Start: 2020-12-23 | End: 2020-12-23

## 2020-12-23 RX ORDER — LANOLIN ALCOHOL/MO/W.PET/CERES
5 CREAM (GRAM) TOPICAL AT BEDTIME
Refills: 0 | Status: DISCONTINUED | OUTPATIENT
Start: 2020-12-23 | End: 2020-12-30

## 2020-12-23 RX ADMIN — QUETIAPINE FUMARATE 100 MILLIGRAM(S): 200 TABLET, FILM COATED ORAL at 10:32

## 2020-12-23 NOTE — ED BEHAVIORAL HEALTH ASSESSMENT NOTE - RISK ASSESSMENT
Assessed at elevated acute risk for suicide given current report of SI and depression, concern for substance intoxication, with hx of substance abuse , unclear if adherent with outpatient care, unclear social supports. Unable to engage in safety planning High Acute Suicide Risk

## 2020-12-23 NOTE — BH PATIENT PROFILE - STATED REASON FOR ADMISSION
40 year old female with substance induced mood disorder vs schizophrenia. Presented to ED with suicidal ideation with plan to OD. Uses ETOH and cocaine last night

## 2020-12-23 NOTE — ED BEHAVIORAL HEALTH NOTE - BEHAVIORAL HEALTH NOTE
==================  PRE-HOSPITAL COURSE  ==================  SOURCE: [facility]  DETAILS: Pt. BIB EMS, initiated by self c/o depression and SI       ==================  ED COURSE  ==================  SOURCE: [facility] / ED documentation   ARRIVAL: EMS, unaccompanied   BELONGINGS: large bag of clothes   BEHAVIOR: Pt. presents to ED alert and oriented, disheveled in appearance.  Pt. calm but somewhat uncooperative, requiring redirection to comply with various triage processes.  Pt. initially refused EKG but eventually agreed, has not yet given urine.  Pt. eye contact poor, speech clear and coherent but pt. seems guarded.  Pt. repeatedly stating “I’m not safe” and “I need a bed”. Endorsing SI, shares she has attempted suicide in the past by overdose and would do the same this time.  Pt. denies additional complaints of HI and AH/VH.  Pt. mood reportedly depressed with flat affect observed.  Pt. resting comfortably while awaiting evaluation, has not eaten or drank since arrival.   TREATMENT: no medications given, no security intervention required  VISITORS: no visitors present in ED         ==================  COLLATERAL  ==================    NAME: Nicol Cole   NUMBER:  [phone]  [facility]    RELIABILITY: -  COMMENTS: voicemail left requesting callback 4:22    NAME: Paula Abbott   NUMBER:  [phone]  RELATIONSHIP: mother  RELIABILITY: -  COMMENTS: mailbox full           COVID Exposure Screen- ED  1.        *Has the patient had a COVID-19 test in the last 21 days?  (  ) Yes   (   ) No   ( X ) Unknown-   IF YES PROCEED TO QUESTION #2. IF NO OR UNKNOWN THEN PLEASE SKIP TO QUESTION #3.  2.        Date of test:   3.        Do you know the result? (   ) Negative   (  ) Positive   (  ) No result available  4.        *In the past 14 days, has the patient been around anyone with a positive COVID-19 test?*  (  ) Yes   (   ) No   ( X ) Unknown- Reason (e.g. collateral uncertain, refusing to answer, etc.):  ______  IF YES PROCEED TO QUESTION #5. IF NO or UNKNOWN, PLEASE SKIP TO QUESTION #10  5.        Was the patient within 6 feet of them for at least 15 minutes? (  ) Yes   (  ) No   (  ) Unknown- Reason: ______   6.        Did the patient provide care for them? (  ) Yes   (  ) No   (  ) Unknown- Reason: ______   7.        Did the patient have direct physical contact with them (touched, hugged, or kissed them)? (  ) Yes   (  ) No    (  ) Unknown- Reason: ______   8.        Did the patient share eating or drinking utensils with them? (  ) Yes   (  ) No    (  ) Unknown- Reason: ______   9.        Have they sneezed, coughed, or somehow got respiratory droplets on the patient? (  ) Yes   (  ) No    (  ) Unknown- Reason: ______   10.     *Have you been out of [place] within the past 14 days?*  (  ) Yes   (  ) No   ( X ) Unknown-   IF YES PLEASE ANSWER THE FOLLOWING QUESTIONS:  11.     Which state/country have you been to? ______  12.     Were you there over 24 hours? (  ) Yes   (  ) No    (  ) Unknown- Reason: ______  13.     Date of return to [facility]: ______

## 2020-12-23 NOTE — BH INPATIENT PSYCHIATRY ASSESSMENT NOTE - NSBHCRANIAL_PSY_ALL_CORE
Recognizes 2 fingers or can read (II)/Normal speech (IX, X, XII)/Shoulder shrug (XI)/Hearing intact (VIII)

## 2020-12-23 NOTE — BH INPATIENT PSYCHIATRY ASSESSMENT NOTE - CURRENT MEDICATION
MEDICATIONS  (STANDING):  melatonin. 5 milliGRAM(s) Oral at bedtime    MEDICATIONS  (PRN):  LORazepam     Tablet 1 milliGRAM(s) Oral every 8 hours PRN anxiety

## 2020-12-23 NOTE — BH INPATIENT PSYCHIATRY ASSESSMENT NOTE - HPI (INCLUDE ILLNESS QUALITY, SEVERITY, DURATION, TIMING, CONTEXT, MODIFYING FACTORS, ASSOCIATED SIGNS AND SYMPTOMS)
Patient is a 40 year old woman with unclear past psych hx, domiciled alone, transferred from [facility] under voluntary status after she presented endorsing SI. On assessment patient is calm and superficially cooperative. She reports feeling "sad" for many years and chronically suicidal. When asked what happened that acutely brought her to the hospital now patient is unable to elaborate and offers "just the feelings, I was feeling suicidal". Reports she had thoughts on "overdosing on any pills I had at home". When asked what stopped her from doing so she says "I went to the hospital". She reports past episodes of suicide attempts with thoughts of overdosing, however it is unclear whether she ever acted on them. She reports in addition difficulty sleeping and asks writer "can you give me some melatonin, seroquel and ativan?". Patient reports she takes these medications at home, however writer cannot confirm this since patient denies having a stable prescriber or obtaining these medications from a specific pharmacy. As per patient whenever she runs out she goes to an emergency room to request refills for the ativan and seroquel and buys the melatonin OTC. Patient denies AH/VH/paranoid/persecutory thoughts. Reports smoking 4 packs cigarettes daily and using cocaine "occasionally" most recent reported use 1 week ago. She denies suicidal/homicidal ideation, intent or plans at this time, stating "I don't feel suicidal now because Im in the hospital, being around people makes me feel better". She contracts for safety in the unit.

## 2020-12-23 NOTE — BH INPATIENT PSYCHIATRY ASSESSMENT NOTE - NSCOMMENTSUICPROTRISKFACT_PSY_ALL_CORE
Protective factors include help seeking behavior, the fact patient is currently in the hospital, in a safe, controlled environment and she denies suicidal ideation, intent or plan at the moment

## 2020-12-23 NOTE — ED BEHAVIORAL HEALTH ASSESSMENT NOTE - HPI (INCLUDE ILLNESS QUALITY, SEVERITY, DURATION, TIMING, CONTEXT, MODIFYING FACTORS, ASSOCIATED SIGNS AND SYMPTOMS)
41 yo woman, domiciled alone, unemployed, per chart and psyckes hx of Substance Induced Mood Disorder vs Schizophrenia/SAD, polysubstance use (alcohol, cocaine), multiple prior hospitalizations (last in August 2020 at [facility]), per chart on AOT, hx of invega sustenna unclear when last dose, per chart previous suicide attempts, with remote PMH of seizure disorder per chart, who walked into the ED this morning with complaint of depression and suicidal thoughts.     Of note, review of chart indicates frequent cocaine positive UDS. Additionally, when last admitted within [facility], she submitted a written request for discharge only a few days into hospital stay, leaving hospital against recommendation by inpatient team for rehab, with charted primary diagnosis of Substance Induced Mood Disorder.     Patient in ED presents as a poor historian. She is somewhat drowsy. She has notably slurred speech (seemingly not described as baseline in other notes). She demonstrates some psychomotor agitation (swaying movement throughout interview). She provides vague responses and when pressed for details they are often conflicting with her own hx/chart. She perseverates on "feeling suicidal" and "I know I shouldn't be alone." She states that the last time she was feeling suicidal she tried to overdose on "hospital pills" and was admitted to [facility] (per chart she has been seen by our service since that time with suicidal complaint and further there was no report of ingestion at that visit). Patient reports currently she is thinking about overdosing on any pills she can find. She denies HI/AVH/PI. She states that she is not on medications and later that she still takes NAGY (but can't say where). She states that she does not drink or use drugs.     COVID Exposure Screen- Patient     1.        *Have you had a COVID-19 test in the last 21 days?  (  ) Yes   (x  ) No   (  ) Unknown- Reason: ______  IF YES PROCEED TO QUESTION #2. IF NO OR UNKNOWN THEN PLEASE SKIP TO QUESTION #3.  2.        Date of test: ________  3.        3. Do you know the result? (  ) Negative   (  ) Positive   (  ) No result available  4.        *In the past 14 days, have you been around anyone with a positive COVID-19 test?*  (  ) Yes   ( x ) No   (  ) Unknown- Reason (e.g. patient uncertain, sedated, refusing to answer, etc.):  ______  IF YES PROCEED TO QUESTION #5. IF NO or UNKNOWN, PLEASE SKIP TO QUESTION #10  5.        Were you within 6 feet of them for at least 15 minutes? (  ) Yes   (  ) No   (  ) Unknown- Reason: _____  6.        Have you provided care for them? (  ) Yes   (  ) No   (  ) Unknown- Reason: ______  7.        Have you had direct physical contact with them (touched, hugged, or kissed them)? (  ) Yes   (  ) No    (  ) Unknown- Reason: ___  8.        Have you shared eating or drinking utensils with them? (  ) Yes   (  ) No    (  ) Unknown- Reason: ____  9.        Have they sneezed, coughed, or somehow got respiratory droplets on you? (  ) Yes   (  ) No    (  ) Unknown- Reason: ______  10.     *Have you been out of [place] within the past 14 days?*  (  ) Yes   ( x ) No   (  ) Unknown- Reason (e.g. patient uncertain, sedated, refusing to answer, etc.): _______  IF YES PLEASE ANSWER THE FOLLOWING QUESTIONS:  11.     Which state/country have you been to? ______  12.     Were you there over 24 hours? (  ) Yes   (  ) No    (  ) Unknown- Reason: ______  13.     Date of return to [facility]: ______

## 2020-12-23 NOTE — BH INPATIENT PSYCHIATRY ASSESSMENT NOTE - NSDCCRITERIA_PSY_ALL_CORE
Improvement in mood symptoms, patient will be able to participate in safety plan and aftercare planning.

## 2020-12-23 NOTE — BH INPATIENT PSYCHIATRY ASSESSMENT NOTE - RISK ASSESSMENT
Patient has the following risk factors for suicide: self reported hx of chronic si, substance use and limited social supports. These risk factors are mitigated by the following protective factors: help seeking behavior, patient is currently in a safe, controlled environment and denies suicidal ideation, intent or plan within the context of this environment. She contracts for safety in the unit.

## 2020-12-23 NOTE — ED BEHAVIORAL HEALTH ASSESSMENT NOTE - DETAILS
d/w ED attending plan to observe for safety and sobriety, obtain collateral and reassessment self reports "allergy" to haldol of "stiffness" pt reports vague SI  see HPI

## 2020-12-23 NOTE — ED PROVIDER NOTE - ATTENDING CONTRIBUTION TO CARE
Attending Statement: I have personally performed a face to face diagnostic evaluation on this patient. I have reviewed the ACP note and agree with the history, exam and plan of care, except as noted.     Attending Contribution to Care:  39y/o AAF with hx of substance induced mood disorder vs schizophrenia/SAD, polysubstance use (cocaine, alcohol) who p/w SI in the setting of substance use including cocaine. Pt placed on 1:1 and medically cleared for psych eval. Pt signed out to day team pending psych reassessment and possible admission.

## 2020-12-23 NOTE — ED BEHAVIORAL HEALTH ASSESSMENT NOTE - OTHER
writhing movements throughout interview while seated defer +vague depression and suicidal ideation gait not observed vague

## 2020-12-23 NOTE — ED BEHAVIORAL HEALTH ASSESSMENT NOTE - NSBHSACONSEQUENCE_PSY_A_CORE FT
note that patient denies all substance use, chart notes significant cocaine and EtOH hx. refused rehab at [facility] admission.

## 2020-12-23 NOTE — ED PROVIDER NOTE - OBJECTIVE STATEMENT
The pt is a 39 y/o F, BIBA, stating "I've very depressed and suicidal" x few d. Claims that lives alone, does not feel safe being alone, has attempted suicide in past by OD and plans to do same at this time. Eating and sleeping well. Does not take any psych meds at this time. Denies cp, sob, cough, n/v/d, abd pain, fevers, chills, alcohol or drug use

## 2020-12-23 NOTE — ED BEHAVIORAL HEALTH ASSESSMENT NOTE - OTHER PAST PSYCHIATRIC HISTORY (INCLUDE DETAILS REGARDING ONSET, COURSE OF ILLNESS, INPATIENT/OUTPATIENT TREATMENT)
Documented history of schizoaffective disorder, cocaine and alcohol abuse, many past psychiatric admissions, hx of AOT unclear if still active.   On Invega Sustenna from last psychiatric admission to [facility] in August.  Documented history of suicide attempt

## 2020-12-23 NOTE — ED ADULT NURSE NOTE - OBJECTIVE STATEMENT
41 y/o female w/ PMH Schizophrenia presents to ED w/ c/o +SI and depression x2 days. denies plan or attempt, does have hx attempt w/ OD. currently not on any psych medications. denies any other complaints. requesting a bed to sleep in on arrival to ED. denies AH/VH/HI

## 2020-12-23 NOTE — ED ADULT NURSE REASSESSMENT NOTE - NS ED NURSE REASSESS COMMENT FT1
pt changed into hospital gown, belongings wanded by security, 3 bags labeled and placed by charge nurse. security has pt's lighter, cigarettes,and ecigarette. pt placed on 1:1 per protocol

## 2020-12-23 NOTE — ED ADULT NURSE REASSESSMENT NOTE - NS ED NURSE REASSESS COMMENT FT1
Pt received from Katerin OLIVEIRA. Pt sleeping in stretcher at this time, Mati PCT at arms length per one to one protocol. Stretcher low and locked.

## 2020-12-23 NOTE — ED PROVIDER NOTE - PROGRESS NOTE DETAILS
Director - pt received from night team pending psych re-eval by day team.  Pt resting comfortably, medically clear for psych.  VS, labs reviewed. Director - eval by psych; request seroquel 100 and will reassess at 12. plan for voluntary admission for patient. pt agreeable to plan. Director - Pt denies h/o sz, no sz meds listed in med record, pt w/o sz activity in ed for > 12 hours while here being eval by psych and ed, pt does not appear to be at risk for sz. obtained bed for patient. plan to transfer to [facility].

## 2020-12-23 NOTE — BH INPATIENT PSYCHIATRY ASSESSMENT NOTE - NSBHASSESSSUMMFT_PSY_ALL_CORE
Patient is a 40 year old woman with unclear past psych hx, domiciled alone, transferred from [facility] under voluntary status after she presented endorsing SI. On assessment patient is calm and superficially cooperative. She reports feeling "sad" for many years and chronically suicidal.  Reports she had thoughts on "overdosing on any pills I had at home". When asked what stopped her from doing so she says "I went to the hospital". Patient's affect is not congruent with her reported mood or history, for instance as she was telling writer about her past suicidal thoughts she was smiling and laughing at times. Patient denies suicidal ideation, intent or plan at the time of interview and adds "I stopped feeling suicidal once I was in the hospital, it calms me" and contracts for safety in the unit. Adds that she has difficulty sleeping as well and requests to be given "seroquel, ativan and melatonin for sleep". Patient admits to nicotine and cocaine use, most recent cocaine use 1 week ago. Denies AH/VH/SI/HI. There is suspicion of possible secondary gain.   Impression: Cocaine induced mood disorder, r/o secondary gain.  Plan:  1) Admit to L3 under voluntary status  2) Started melatonin 5mg QHS for sleep and ativan 1mg PO PRN for anxiety  3) Ordered labs and EKG to monitor QTc

## 2020-12-23 NOTE — ED BEHAVIORAL HEALTH ASSESSMENT NOTE - SUMMARY
39 yo woman, domiciled alone, unemployed, per chart and psyckes hx of Substance Induced Mood Disorder vs Schizophrenia/SAD, polysubstance use (alcohol, cocaine), multiple prior hospitalizations (last in August 2020 at [facility]), per chart on AOT, hx of invega sustenna unclear when last dose, per chart previous suicide attempts, with remote PMH of seizure disorder per chart, who walked into the ED this morning with complaint of depression and suicidal thoughts.     patient is a poor historian, no answers to collateral calls thus far. presentation and review of hx suspicious for primary substance induced pathology. will hold for re-evaluation and collateral.

## 2020-12-23 NOTE — ED PROVIDER NOTE - CLINICAL SUMMARY MEDICAL DECISION MAKING FREE TEXT BOX
pt w/hx of schizophrenia c/o feeling depressed and claiming si, repeatedly asking to be admitted and claims that does not feel safe being alone, has had past psych admissions w/documented substance induced symptoms, labs done, pt slow to provide urine for tox screen,  tele psych consulted - recommending re eval in am

## 2020-12-23 NOTE — ED BEHAVIORAL HEALTH NOTE - BEHAVIORAL HEALTH NOTE
39 yo woman, domiciled alone, unemployed, per chart and psyckes hx of Substance Induced Mood Disorder vs Schizophrenia/SAD, polysubstance use (alcohol, cocaine), multiple prior hospitalizations (last in August 2020 at [facility]), per chart on AOT, hx of invega sustenna unclear when last dose, per chart previous suicide attempts, with remote PMH of seizure disorder per chart, who walked into the ED this morning with complaint of depression and suicidal thoughts.    S: Pt reassessed this morning. Noted to be sleeping on sleeping, needing both verbal and tactile stimulation to awaken. She is cooperative with slight irritable edge. Per sitter, pt has been sleeping for several hours, in no distress. Pt reports feeling suicidal with intent, but no reported plan. She reports wanting to go to a hospital. She denies currently being in psychiatric treatment by provider and states that she is not longer being followed by AOT. States that she is not currently on any medications 'for a while', but reports improvement in mood and symptoms when she takes Seroquel and is willing to take seroquel now in the ED. Unable to provide date she last took medications. Denies a/vh or paranoia. Frequently drifting to sleep through interaction.   MSE: pt wearing hospital gown, appears older than stated age, unkempt, poor eye contact, no PMR/PMR, speech is of normal volume Mood" suicidal, affect constricted, at times has slight irritable edge as she is wanting to sleep than talk. Organized thought process. +si with intent no plan, no hi, insight/judgement; fair  Assessment: 39y/o AAF with hx of substance induced mood disorder vs schizophrenia/SAD, polysubstance use (cocaine, alcohol). Pt has prior similar presentations to ED. Concern of substance use affecting mood and precipitating ED admission.   Pt is chronically mentally ill with current substance use-cocaine. Has hx of presenting to ED with +cocaine utox and +SI, requesting admission and once being admitting to [facility], within a few days into hospital stay she submits a 72hr letter requesting release from hospital despite inpatient team's recommendation for rehab. She has charted primary diagnosis of Substance induced mood disorder. Strong suspicion for malingering and secondary gain. Per review of last ED visit for similar presentation 9/31/20, pt's  at the time reported believing that pt uses ER to sleep and rest after she uses drugs as she has no place to go. If she were to return to her mother's home intoxicated, there would be subsequent conflict.     Plan:  -reassess patient after she has received seroquel  -reassess mood as drugs continue to metabolize  -assess pt's substance use +cocaine  - plan to discharge once mood improves 41 yo woman, domiciled alone, unemployed, per chart and psyckes hx of Substance Induced Mood Disorder vs Schizophrenia/SAD, polysubstance use (alcohol, cocaine), multiple prior hospitalizations (last in August 2020 at [facility]), per chart on AOT, hx of invega sustenna unclear when last dose, per chart previous suicide attempts, with remote PMH of seizure disorder per chart, who walked into the ED this morning with complaint of depression and suicidal thoughts.    S: Pt reassessed this morning. Noted to be sleeping on sleeping, needing both verbal and tactile stimulation to awaken. She is cooperative with slight irritable edge. Per sitter, pt has been sleeping for several hours, in no distress. Pt reports feeling suicidal with intent, but no reported plan. She reports wanting to go to a hospital. She denies currently being in psychiatric treatment by provider and states that she is not longer being followed by AOT. States that she is not currently on any medications 'for a while', but reports improvement in mood and symptoms when she takes Seroquel and is willing to take seroquel now in the ED. Unable to provide date she last took medications. Denies a/vh or paranoia. Frequently drifting to sleep through interaction.   MSE: pt wearing hospital gown, appears older than stated age, unkempt, poor eye contact, no PMR/PMR, speech is of normal volume Mood" suicidal, affect constricted, at times has slight irritable edge as she is wanting to sleep than talk. Organized thought process. +si with intent no plan, no hi, insight/judgement; fair  Assessment: 39y/o AAF with hx of substance induced mood disorder vs schizophrenia/SAD, polysubstance use (cocaine, alcohol). Pt has prior similar presentations to ED. Concern of substance use affecting mood and precipitating ED admission.   Pt is chronically mentally ill with current substance use-cocaine. Has hx of presenting to ED with +cocaine utox and +SI, requesting admission and once being admitting to [facility], within a few days into hospital stay she submits a 72hr letter requesting release from hospital despite inpatient team's recommendation for rehab. She has charted primary diagnosis of Substance induced mood disorder. Strong suspicion for malingering and secondary gain. Per review of last ED visit for similar presentation 9/31/20, pt's  at the time reported believing that pt uses ER to sleep and rest after she uses drugs as she has no place to go. If she were to return to her mother's home intoxicated, there would be subsequent conflict.     Plan:  -reassess patient after she has received seroquel  -reassess mood as drugs continue to metabolize  -assess pt's substance use +cocaine  - plan to discharge once mood improves        Patient re-assessed at 1pm and then later at 3pm.  She continues to present highly irritable, reporting depressed mood and active thoughts of harming herself. She requests inpatient voluntary admission to address her mood symptoms and SI and reports motivation to engage in treatment.   Assessment: 41 y/o woman with history of SIMD vs schizophrenia/schizoaffective disorder, polysubstance use, multiple psychiatric admissions in the past, on AOT, presenting to the ED with low and irritable mood and active SI in context of recent use of substances (utox +cocaine). Patient received prn with seroquel and was observed in the ED for several hours (in the past symptoms resolved). She remained highly irritable, reporting SI with plan to OD. Patient is in need of further observation and treatment on an inpatient psychiatric unit. She is currently reporting motivation to engage in treatment.     Plan:  -admit to inpatient psychiatry, voluntary status  -seroquel 100mg po q12h for mood stabilization  -prns for agitation with haldol 5mg poq6h and ativan 2mg po q6h

## 2020-12-24 VITALS — TEMPERATURE: 97 F

## 2020-12-24 LAB
ALBUMIN SERPL ELPH-MCNC: 3.4 G/DL — SIGNIFICANT CHANGE UP (ref 3.3–5)
ALP SERPL-CCNC: 57 U/L — SIGNIFICANT CHANGE UP (ref 40–120)
ALT FLD-CCNC: 15 U/L — SIGNIFICANT CHANGE UP (ref 4–41)
ANION GAP SERPL CALC-SCNC: 7 MMOL/L — SIGNIFICANT CHANGE UP (ref 7–14)
AST SERPL-CCNC: 13 U/L — SIGNIFICANT CHANGE UP (ref 4–40)
BASOPHILS # BLD AUTO: 0.01 K/UL — SIGNIFICANT CHANGE UP (ref 0–0.2)
BASOPHILS NFR BLD AUTO: 0.3 % — SIGNIFICANT CHANGE UP (ref 0–2)
BILIRUB SERPL-MCNC: 0.3 MG/DL — SIGNIFICANT CHANGE UP (ref 0.2–1.2)
BUN SERPL-MCNC: 12 MG/DL — SIGNIFICANT CHANGE UP (ref 7–23)
CALCIUM SERPL-MCNC: 8.9 MG/DL — SIGNIFICANT CHANGE UP (ref 8.4–10.5)
CHLORIDE SERPL-SCNC: 107 MMOL/L — SIGNIFICANT CHANGE UP (ref 98–107)
CO2 SERPL-SCNC: 25 MMOL/L — SIGNIFICANT CHANGE UP (ref 22–31)
CREAT SERPL-MCNC: 1.08 MG/DL — SIGNIFICANT CHANGE UP (ref 0.5–1.3)
EOSINOPHIL # BLD AUTO: 0.13 K/UL — SIGNIFICANT CHANGE UP (ref 0–0.5)
EOSINOPHIL NFR BLD AUTO: 4.4 % — SIGNIFICANT CHANGE UP (ref 0–6)
GLUCOSE SERPL-MCNC: 78 MG/DL — SIGNIFICANT CHANGE UP (ref 70–99)
HCT VFR BLD CALC: 33.4 % — LOW (ref 39–50)
HGB BLD-MCNC: 10.7 G/DL — LOW (ref 13–17)
IANC: 0.78 K/UL — LOW (ref 1.5–8.5)
IMM GRANULOCYTES NFR BLD AUTO: 0 % — SIGNIFICANT CHANGE UP (ref 0–1.5)
LYMPHOCYTES # BLD AUTO: 1.73 K/UL — SIGNIFICANT CHANGE UP (ref 1–3.3)
LYMPHOCYTES # BLD AUTO: 58.4 % — HIGH (ref 13–44)
MCHC RBC-ENTMCNC: 28.2 PG — SIGNIFICANT CHANGE UP (ref 27–34)
MCHC RBC-ENTMCNC: 32 GM/DL — SIGNIFICANT CHANGE UP (ref 32–36)
MCV RBC AUTO: 88.1 FL — SIGNIFICANT CHANGE UP (ref 80–100)
MONOCYTES # BLD AUTO: 0.31 K/UL — SIGNIFICANT CHANGE UP (ref 0–0.9)
MONOCYTES NFR BLD AUTO: 10.5 % — SIGNIFICANT CHANGE UP (ref 2–14)
NEUTROPHILS # BLD AUTO: 0.78 K/UL — LOW (ref 1.8–7.4)
NEUTROPHILS NFR BLD AUTO: 26.4 % — LOW (ref 43–77)
NRBC # BLD: 0 /100 WBCS — SIGNIFICANT CHANGE UP
NRBC # FLD: 0 K/UL — SIGNIFICANT CHANGE UP
PLATELET # BLD AUTO: 306 K/UL — SIGNIFICANT CHANGE UP (ref 150–400)
POTASSIUM SERPL-MCNC: 4.1 MMOL/L — SIGNIFICANT CHANGE UP (ref 3.5–5.3)
POTASSIUM SERPL-SCNC: 4.1 MMOL/L — SIGNIFICANT CHANGE UP (ref 3.5–5.3)
PROT SERPL-MCNC: 6.3 G/DL — SIGNIFICANT CHANGE UP (ref 6–8.3)
RBC # BLD: 3.79 M/UL — LOW (ref 4.2–5.8)
RBC # FLD: 14.9 % — HIGH (ref 10.3–14.5)
SARS-COV-2 IGG SERPL QL IA: NEGATIVE — SIGNIFICANT CHANGE UP
SARS-COV-2 IGM SERPL IA-ACNC: 0.14 INDEX — SIGNIFICANT CHANGE UP
SODIUM SERPL-SCNC: 139 MMOL/L — SIGNIFICANT CHANGE UP (ref 135–145)
WBC # BLD: 2.96 K/UL — LOW (ref 3.8–10.5)
WBC # FLD AUTO: 2.96 K/UL — LOW (ref 3.8–10.5)

## 2020-12-24 PROCEDURE — 99222 1ST HOSP IP/OBS MODERATE 55: CPT

## 2020-12-24 RX ORDER — QUETIAPINE FUMARATE 200 MG/1
100 TABLET, FILM COATED ORAL
Refills: 0 | Status: DISCONTINUED | OUTPATIENT
Start: 2020-12-24 | End: 2020-12-26

## 2020-12-24 RX ORDER — QUETIAPINE FUMARATE 200 MG/1
50 TABLET, FILM COATED ORAL EVERY 4 HOURS
Refills: 0 | Status: DISCONTINUED | OUTPATIENT
Start: 2020-12-24 | End: 2020-12-30

## 2020-12-24 RX ORDER — FLUPHENAZINE HYDROCHLORIDE 1 MG/1
5 TABLET, FILM COATED ORAL ONCE
Refills: 0 | Status: DISCONTINUED | OUTPATIENT
Start: 2020-12-24 | End: 2020-12-30

## 2020-12-24 RX ADMIN — Medication 5 MILLIGRAM(S): at 20:22

## 2020-12-24 RX ADMIN — QUETIAPINE FUMARATE 100 MILLIGRAM(S): 200 TABLET, FILM COATED ORAL at 20:22

## 2020-12-24 NOTE — BH TREATMENT PLAN - NSTXCOPEGOALOTHER_PSY_ALL_CORE
Patient will identify 1-2 effective coping methods for improved symptom management on the unit and in the community.

## 2020-12-24 NOTE — CHART NOTE - NSCHARTNOTEFT_GEN_A_CORE
Screening Medical Evaluation  Patient Admitted from: [facility] ED    [facility] admitting diagnosis: Major depressive disorder with single episode    PAST MEDICAL & SURGICAL HISTORY:  Schizophrenia          Allergies    Haldol (Unknown)    Intolerances        Social History:     FAMILY HISTORY:      MEDICATIONS  (STANDING):  melatonin. 5 milliGRAM(s) Oral at bedtime  QUEtiapine 100 milliGRAM(s) Oral two times a day    MEDICATIONS  (PRN):  fluPHENAZine  Injectable 5 milliGRAM(s) IntraMuscular once PRN severe agitation  LORazepam     Tablet 1 milliGRAM(s) Oral every 8 hours PRN anxiety  QUEtiapine 50 milliGRAM(s) Oral every 4 hours PRN agitation      Vital Signs Last 24 Hrs  T(C): 35.9 (24 Dec 2020 19:22), Max: 36.3 (24 Dec 2020 07:57)  T(F): 96.6 (24 Dec 2020 19:22), Max: 97.4 (24 Dec 2020 07:57)  HR: --  BP: --  BP(mean): --  RR: --  SpO2: --  CAPILLARY BLOOD GLUCOSE            PHYSICAL EXAM:  GENERAL: NAD, well-developed  HEAD:  Atraumatic, Normocephalic  EYES: EOMI, PERRLA, conjunctiva and sclera clear  NECK: Supple, No JVD  CHEST/LUNG: Clear to auscultation bilaterally; No wheeze  HEART: Regular rate and rhythm; No murmurs, rubs, or gallops  ABDOMEN: Soft, Nontender, Nondistended; Bowel sounds present  EXTREMITIES:  2+ Peripheral Pulses, No clubbing, cyanosis, or edema  PSYCH: AAOx3  NEUROLOGY: non-focal  SKIN: No rashes or lesions    LABS:                        10.7   2.96  )-----------( 306      ( 24 Dec 2020 10:18 )             33.4     12-24    139  |  107  |  12  ----------------------------<  78  4.1   |  25  |  1.08    Ca    8.9      24 Dec 2020 10:18    TPro  6.3  /  Alb  3.4  /  TBili  0.3  /  DBili  x   /  AST  13  /  ALT  15  /  AlkPhos  57  12-24          Urinalysis Basic - ( 23 Dec 2020 07:09 )    Color: Yellow / Appearance: Clear / SG: >=1.030 / pH: x  Gluc: x / Ketone: NEGATIVE  / Bili: Negative / Urobili: 1.0 E.U./dL   Blood: x / Protein: Trace mg/dL / Nitrite: NEGATIVE   Leuk Esterase: NEGATIVE / RBC: < 5 /HPF / WBC < 5 /HPF   Sq Epi: x / Non Sq Epi: 5-10 /HPF / Bacteria: Present /HPF        RADIOLOGY & ADDITIONAL TESTS:    Assessment and Plan: Screening Medical Evaluation  Patient Admitted from: [facility] ED    [facility] admitting diagnosis: Major depressive disorder with single episode    PAST MEDICAL & SURGICAL HISTORY:  Schizophrenia          Allergies    Haldol (Unknown)    Intolerances        Social History: 4 PPD smoker, +cocaine use    FAMILY HISTORY:      MEDICATIONS  (STANDING):  melatonin. 5 milliGRAM(s) Oral at bedtime  QUEtiapine 100 milliGRAM(s) Oral two times a day    MEDICATIONS  (PRN):  fluPHENAZine  Injectable 5 milliGRAM(s) IntraMuscular once PRN severe agitation  LORazepam     Tablet 1 milliGRAM(s) Oral every 8 hours PRN anxiety  QUEtiapine 50 milliGRAM(s) Oral every 4 hours PRN agitation      Vital Signs Last 24 Hrs  T(C): 35.9 (24 Dec 2020 19:22), Max: 36.3 (24 Dec 2020 07:57)  T(F): 96.6 (24 Dec 2020 19:22), Max: 97.4 (24 Dec 2020 07:57)  HR: --  BP: --  BP(mean): --  RR: --  SpO2: --  CAPILLARY BLOOD GLUCOSE            PHYSICAL EXAM:  GENERAL: NAD, well-developed  HEAD:  Atraumatic, Normocephalic  EYES: EOMI, PERRLA, conjunctiva and sclera clear  NECK: Supple, No JVD  CHEST/LUNG: Clear to auscultation bilaterally; No wheeze  HEART: Regular rate and rhythm; No murmurs, rubs, or gallops  ABDOMEN: Soft, Nontender, Nondistended; Bowel sounds present  EXTREMITIES:  2+ Peripheral Pulses, No clubbing, cyanosis, or edema  PSYCH: AAOx3  NEUROLOGY: non-focal  SKIN: No rashes or lesions    LABS:                        10.7   2.96  )-----------( 306      ( 24 Dec 2020 10:18 )             33.4     12-24    139  |  107  |  12  ----------------------------<  78  4.1   |  25  |  1.08    Ca    8.9      24 Dec 2020 10:18    TPro  6.3  /  Alb  3.4  /  TBili  0.3  /  DBili  x   /  AST  13  /  ALT  15  /  AlkPhos  57  12-24          Urinalysis Basic - ( 23 Dec 2020 07:09 )    Color: Yellow / Appearance: Clear / SG: >=1.030 / pH: x  Gluc: x / Ketone: NEGATIVE  / Bili: Negative / Urobili: 1.0 E.U./dL   Blood: x / Protein: Trace mg/dL / Nitrite: NEGATIVE   Leuk Esterase: NEGATIVE / RBC: < 5 /HPF / WBC < 5 /HPF   Sq Epi: x / Non Sq Epi: 5-10 /HPF / Bacteria: Present /HPF        RADIOLOGY & ADDITIONAL TESTS:    Assessment and Plan:  40 year old female with no significant PMHx present to [facility] with an admitting diagnosis of Major depressive disorder with single episode. Pt has no medical complaints at this time including fevers, headache, dizziness, changes in vision, chest pain, SOB, abdominal pain, N/V/D/C, dysuria.     1. Major depressive disorder with single episode- follow plan as per primary psychiatric team

## 2020-12-24 NOTE — BH INPATIENT PSYCHIATRY PROGRESS NOTE - NSBHFUPINTERVALHXFT_PSY_A_CORE
Staff report no interval events, she arrived on the unit and slept.  She is not cooeprative with interview today and we struggle to engage her.  She reports she is not on AOT anymore.  Previous chart yields she lives with mother.  She reports she has not lived with mother for the last week and a half.  We discussed and will offer quetiapine for depression.

## 2020-12-24 NOTE — BH TREATMENT PLAN - NSTXDEPRESGOAL_PSY_ALL_CORE
Called mom-went to [facility] emergency room last night-flu and rsv were negative per mom-diagnosed with pneumonia-on steroid, to finish antibiotic previously given  For ears, nebulizer treatments-slept better last night-is eating and drinking today and mood is good-slight improvement in her breathing, does not have any shortness of breath or retractions per mom-to continue current treatment plan, will call with update tomorrow and schedule follow up appt-mom agrees with plan and will call prior with any concerns or questions.   Exhibit improvements in self-grooming, hygiene, sleep and appetite

## 2020-12-24 NOTE — BH SOCIAL WORK INITIAL PSYCHOSOCIAL EVALUATION - OTHER PAST PSYCHIATRIC HISTORY (INCLUDE DETAILS REGARDING ONSET, COURSE OF ILLNESS, INPATIENT/OUTPATIENT TREATMENT)
Per chart patient is on AOT and possibly following up at [facility]. Recent hospitalization at [facility].

## 2020-12-24 NOTE — BH SOCIAL WORK INITIAL PSYCHOSOCIAL EVALUATION - NSPTSTATEDGOAL_PSY_ALL_CORE
Pt did not engage due to being asleep in bed and was unable to arouse. Requested to meet at a later time.

## 2020-12-24 NOTE — BH TREATMENT PLAN - NSTXCOPEINTERPR_PSY_ALL_CORE
Writer attempted to meet with patient to orient her to department services/staff. Writer found patient abed and while patient stirred at the sound of writer’s voice, she did not acknowledge it. As patient was therefore unable to collaborate in choosing an appropriate psychiatric rehabilitation goal, writer has selected one for her. Psychiatric rehabilitation staff will encourage patient to actively participate in her treatment to the best of her ability.   Patient was reportedly superficial and guarded during initial assessment but she did endorse being around others/being in the hospital as a coping method that improves mood and makes her feel safe. Patient was also able to contract for safety and identify protective factors; patient’s insight/judgment has been assessed fair. Patient’s prescription/compliance history appears unclear with no stable provider noted in patient’s account and patient admitted to use of cocaine with most recent consumption dated 1 week ago.   Patient was depressed and malodorous upon admission and upon writer’s entry to her room and she reported poor sleep. Patient’s social supports seem severely limited at this time. Patient would benefit from demonstrating daily treatment compliance and identifying 1-2 effective coping methods for symptom management by day 7. Psychiatric rehabilitation will provide purposeful/psychoeducation rounding and individual supportive counseling sessions to assist patient in achieving treatment goals. Note: In response to COVID19, unit programming will be re-evaluated on a consistent basis in effort to maintain safety guidelines.

## 2020-12-24 NOTE — PSYCHIATRIC REHAB INITIAL EVALUATION - NSBHPRRECOMMEND_PSY_ALL_CORE
Writer attempted to meet with patient to orient her to department services/staff. Writer found patient abed and while patient stirred at the sound of writer’s voice, she did not acknowledge it. As patient was therefore unable to collaborate in choosing an appropriate psychiatric rehabilitation goal, writer has selected one for her. Psychiatric rehabilitation staff will encourage patient to actively participate in her treatment to the best of her ability.   Patient was reportedly superficial and guarded during initial assessment but she did endorse being around others/being in the hospital as a coping method that improves mood and makes her feel safe. Patient was also able to contract for safety and identify protective factors; patient’s insight/judgment has been assessed fair. Patient’s prescription/medication compliance history appears unclear with no stable provider noted in patient’s account of episode. Patient admitted use of cocaine with most recent consumption dated 1 week ago.   Patient was depressed and malodorous upon admission and upon writer’s entry to her room and she reported poor sleep. Patient’s social supports seem severely limited at this time. Patient would benefit from demonstrating daily treatment compliance and identifying 1-2 effective coping methods for symptom management by day 7. Psychiatric rehabilitation will provide purposeful/psychoeducation rounding and individual supportive counseling sessions to assist patient in achieving treatment goals. Note: In response to COVID19, unit programming will be re-evaluated on a consistent basis in effort to maintain safety guidelines.

## 2020-12-24 NOTE — BH SOCIAL WORK INITIAL PSYCHOSOCIAL EVALUATION - NSBHCOMMMANAGENAME_PSY_ALL_CORE
former smoker 3 packs/day for 30 yrs, quit 20 years  former drinker hard liquor and beer 30 yrs, quit 28 years  no drugs Tracie Cole - [phone]

## 2020-12-24 NOTE — BH INPATIENT PSYCHIATRY PROGRESS NOTE - NSBHASSESSSUMMFT_PSY_ALL_CORE
Uncooperative 40 year old woman, previously on AOT and a NAGY, lived with mother now reporting no longer on AOT or NAGY.  She has a history of multiple inpatient admissions for depresssed symptoms and SI accompanied by cocaine use.  Per previous chart, suspicion that avoids returning home to mother after cocaine use and goes to hospital instead.  She was admitted ot us on a 913 legal status with UDS +cocaine.  Plan:  1) Continue 913 hospitalization for observation, stabilization and safety; calm and in control so routine checks appropriate.  2) will give quetiapine 100mg twice daily for mood disorder/substance induced mood disorder  3. will change prns to ativan and prolixin IM, quetiapine and ativan po, benadryl

## 2020-12-25 PROCEDURE — 99232 SBSQ HOSP IP/OBS MODERATE 35: CPT

## 2020-12-25 RX ADMIN — QUETIAPINE FUMARATE 100 MILLIGRAM(S): 200 TABLET, FILM COATED ORAL at 09:08

## 2020-12-25 RX ADMIN — Medication 5 MILLIGRAM(S): at 20:15

## 2020-12-25 RX ADMIN — QUETIAPINE FUMARATE 100 MILLIGRAM(S): 200 TABLET, FILM COATED ORAL at 20:16

## 2020-12-25 NOTE — BH INPATIENT PSYCHIATRY PROGRESS NOTE - NSBHFUPINTERVALHXFT_PSY_A_CORE
Patient is followed up for  MDD, and substance use disorder, admitted for depression and SI in context of substance abuse (cocaine).  Chart, medications and labs reviewed.  Patient is discussed with nursing staff. No significant overnight issues.  Patient is observed in room.  She is approachable but minimally engaging during interview.  Patient describes mood as “ ok” but shrugs shoulders.  Reports still feeling depressed, downcast/sad affect, reports sustained emotion of sadness.  Facial expression, demeanor/posture/attitude during interview convey an underlying very depressed mood. Symptoms of poor concentration, anhedonia, low mood, hopelessness/worthlessness are mentioned. Discussed her ongoing challenges and the severity/frequency/duration of her cocaine use. Patient provided emotional support. Patient reports manageable level of anxiety at this time, but appears a bit irritable. Compliance with medication is good, no reported or observed adverse effects.  Medication teaching provided.  Denies SI/HI, no plan or intent, engages in safety planning. No AVH, faraz, delusions or paranoia. No reports of sleep disturbances or appetite concerns.  No behavioral issues on unit. Self -care remains fair.  Patient offers no complaints.

## 2020-12-26 PROCEDURE — 99232 SBSQ HOSP IP/OBS MODERATE 35: CPT

## 2020-12-26 RX ORDER — QUETIAPINE FUMARATE 200 MG/1
100 TABLET, FILM COATED ORAL DAILY
Refills: 0 | Status: DISCONTINUED | OUTPATIENT
Start: 2020-12-26 | End: 2020-12-30

## 2020-12-26 RX ORDER — QUETIAPINE FUMARATE 200 MG/1
200 TABLET, FILM COATED ORAL AT BEDTIME
Refills: 0 | Status: DISCONTINUED | OUTPATIENT
Start: 2020-12-26 | End: 2020-12-30

## 2020-12-26 RX ADMIN — QUETIAPINE FUMARATE 100 MILLIGRAM(S): 200 TABLET, FILM COATED ORAL at 08:49

## 2020-12-26 RX ADMIN — Medication 1 MILLIGRAM(S): at 20:33

## 2020-12-26 RX ADMIN — Medication 5 MILLIGRAM(S): at 20:32

## 2020-12-26 RX ADMIN — QUETIAPINE FUMARATE 200 MILLIGRAM(S): 200 TABLET, FILM COATED ORAL at 20:33

## 2020-12-26 NOTE — BH INPATIENT PSYCHIATRY PROGRESS NOTE - NSBHFUPINTERVALHXFT_PSY_A_CORE
Chart reviewed and case discussed with treatment team.  No events reported overnight. Sleep and appetite is fair. Patient refused to engage in interview, she only shook her head to some questions and kept her eyes closed for most of the interview. Patient is irritable and has been isolative to her room. Patient is compliant with medications, no adverse effects reported.

## 2020-12-27 PROCEDURE — 99232 SBSQ HOSP IP/OBS MODERATE 35: CPT

## 2020-12-27 RX ORDER — NICOTINE POLACRILEX 2 MG
4 GUM BUCCAL
Refills: 0 | Status: DISCONTINUED | OUTPATIENT
Start: 2020-12-27 | End: 2020-12-30

## 2020-12-27 RX ADMIN — QUETIAPINE FUMARATE 200 MILLIGRAM(S): 200 TABLET, FILM COATED ORAL at 20:10

## 2020-12-27 RX ADMIN — QUETIAPINE FUMARATE 100 MILLIGRAM(S): 200 TABLET, FILM COATED ORAL at 09:47

## 2020-12-27 RX ADMIN — Medication 5 MILLIGRAM(S): at 20:09

## 2020-12-27 RX ADMIN — Medication 1 MILLIGRAM(S): at 19:29

## 2020-12-27 RX ADMIN — Medication 4 MILLIGRAM(S): at 15:59

## 2020-12-27 NOTE — BH INPATIENT PSYCHIATRY PROGRESS NOTE - NSBHFUPINTERVALHXFT_PSY_A_CORE
Chart reviewed and case discussed with treatment team.  No events reported overnight. Sleep and appetite is fair. Patient woke up for the interview but only answered questions by moving her head. When asked why she is not talking she just smiled. Patient remains isolative to her room. Patient is compliant with medications, no adverse effects reported.

## 2020-12-27 NOTE — BH INPATIENT PSYCHIATRY PROGRESS NOTE - NSBHADMITIPREASONDETAILS_PSY_A_CORE FT
Patient refused to engage in interview. Titrate Seroquel.
Patient refused to engage in interview. Titrate Seroquel.

## 2020-12-27 NOTE — BH INPATIENT PSYCHIATRY PROGRESS NOTE - NSBHMSESPABN_PSY_A_CORE
Other
Soft volume/Slowed rate/Decreased productivity
Other
Soft volume/Slowed rate/Decreased productivity

## 2020-12-28 PROCEDURE — 99232 SBSQ HOSP IP/OBS MODERATE 35: CPT

## 2020-12-28 RX ADMIN — Medication 5 MILLIGRAM(S): at 20:41

## 2020-12-28 RX ADMIN — QUETIAPINE FUMARATE 100 MILLIGRAM(S): 200 TABLET, FILM COATED ORAL at 08:34

## 2020-12-28 RX ADMIN — QUETIAPINE FUMARATE 200 MILLIGRAM(S): 200 TABLET, FILM COATED ORAL at 20:42

## 2020-12-28 NOTE — BH INPATIENT PSYCHIATRY PROGRESS NOTE - NSBHFUPINTERVALHXFT_PSY_A_CORE
No acute events over the weekend. Pt compliant with meds and slept well. Pt in bed nearly all day today and minimally engaged in evaluation. Reports that she feels much better, mood has improved, and she denies SI/I/P. Pt discharge-focused and unwilling answer most questions if unrelated to discharge. Submitted a 3-day letter yesterday which she now says she would like to retract (and did retract later this afternoon). Instead states wish to leave right now. Writer attempted to explain terms of voluntary admission several times, however pt unwilling to listen and becomes very irritable. Says that she lives alone and plans to visit with mother and siblings this week.

## 2020-12-28 NOTE — BH INPATIENT PSYCHIATRY PROGRESS NOTE - NSBHASSESSSUMMFT_PSY_ALL_CORE
Pt now reporting improved mood and denies SI/I/P. Remains irritable and minimally cooperative, though compliant with meds and in good behavioral control. No evidence of acute psychosis. Retracted 3-day letter submitted over the weekend.   Plan: C/w Seroquel 100mg daily + 200mg qhs

## 2020-12-29 PROCEDURE — 99232 SBSQ HOSP IP/OBS MODERATE 35: CPT

## 2020-12-29 RX ORDER — HYDROXYZINE HCL 10 MG
50 TABLET ORAL EVERY 6 HOURS
Refills: 0 | Status: DISCONTINUED | OUTPATIENT
Start: 2020-12-29 | End: 2020-12-30

## 2020-12-29 RX ADMIN — QUETIAPINE FUMARATE 200 MILLIGRAM(S): 200 TABLET, FILM COATED ORAL at 20:00

## 2020-12-29 RX ADMIN — QUETIAPINE FUMARATE 100 MILLIGRAM(S): 200 TABLET, FILM COATED ORAL at 08:46

## 2020-12-29 RX ADMIN — QUETIAPINE FUMARATE 50 MILLIGRAM(S): 200 TABLET, FILM COATED ORAL at 21:18

## 2020-12-29 RX ADMIN — Medication 1 MILLIGRAM(S): at 20:00

## 2020-12-29 RX ADMIN — Medication 4 MILLIGRAM(S): at 19:11

## 2020-12-29 RX ADMIN — Medication 5 MILLIGRAM(S): at 20:00

## 2020-12-29 RX ADMIN — Medication 50 MILLIGRAM(S): at 21:42

## 2020-12-29 NOTE — BH INPATIENT PSYCHIATRY PROGRESS NOTE - NSBHASSESSSUMMFT_PSY_ALL_CORE
Pt now reporting improved mood and denies SI/I/P. Remains irritable and minimally cooperative, though compliant with meds and in good behavioral control. No evidence of acute psychosis. Retracted 3-day letter submitted over the weekend.   Plan: C/w Seroquel 100mg daily + 200mg qhs Pt now reporting improved mood and denies SI/I/P. She is more organized and cooperative though a bit odd.  She is compliant and her use of prns is tailing off.  Although she retracted her 72 hour letter, she is still very interested in discharge.  If she maintains her gains, we will discharge her tomorrow morning.  Plan: C/w Seroquel 100mg daily + 200mg qhs

## 2020-12-29 NOTE — BH INPATIENT PSYCHIATRY PROGRESS NOTE - NSBHFUPINTERVALHXFT_PSY_A_CORE
Staff describe some slow protress with patient as she becomes more organized, more visible.  She submitted and then retracted a 72 hour letter over the interval.  She can be irritable but is somewhat better related and seeks us out for interview.  She talks to us about voluntary status, reports that she is feeling better and is denying si/hi intents or plans.  She is future oriented and would like to leave the hospital.  We discuss any possible cocaine use prior to admission and she grins sheepishly and nods her head.   Staff describe some slow progress with patient as she becomes more organized, more visible.  She submitted and then retracted a 72 hour letter over the interval.  She can be irritable but is somewhat better related and seeks us out for interview.  She talks to us about voluntary status, reports that she is feeling better and is denying si/hi intents or plans.  She is future oriented and would like to leave the hospital.  We discuss any possible cocaine use prior to admission and she grins sheepishly and nods her head.  She is currently denying psychiatric symptoms, though she is somewhat oddly related.  Previous charts suggest a pattern in which she uses cocaine and has herself admitted and then advocates for discharge when she recompensates.  We discuss the possibility of her seeking help with any cocaine use disorder that she might have and she declines.

## 2020-12-30 VITALS — DIASTOLIC BLOOD PRESSURE: 60 MMHG | SYSTOLIC BLOOD PRESSURE: 105 MMHG | HEART RATE: 73 BPM | TEMPERATURE: 98 F

## 2020-12-30 PROCEDURE — 99239 HOSP IP/OBS DSCHRG MGMT >30: CPT

## 2020-12-30 RX ORDER — QUETIAPINE FUMARATE 200 MG/1
1 TABLET, FILM COATED ORAL
Qty: 90 | Refills: 0
Start: 2020-12-30 | End: 2021-01-28

## 2020-12-30 RX ORDER — LANOLIN ALCOHOL/MO/W.PET/CERES
1 CREAM (GRAM) TOPICAL
Qty: 30 | Refills: 0
Start: 2020-12-30 | End: 2021-01-28

## 2020-12-30 RX ORDER — PALIPERIDONE 1.5 MG/1
1 TABLET, EXTENDED RELEASE ORAL
Qty: 0 | Refills: 0 | DISCHARGE

## 2020-12-30 RX ORDER — HYDROXYZINE HCL 10 MG
1 TABLET ORAL
Qty: 30 | Refills: 0
Start: 2020-12-30 | End: 2021-01-28

## 2020-12-30 RX ADMIN — QUETIAPINE FUMARATE 100 MILLIGRAM(S): 200 TABLET, FILM COATED ORAL at 08:55

## 2020-12-30 NOTE — BH INPATIENT PSYCHIATRY DISCHARGE NOTE - NSBHMETABOLIC_PSY_ALL_CORE_FT
BMI: BMI (kg/m2): 19.6 (12-23-20 @ 02:36)  HbA1c: A1C with Estimated Average Glucose Result: 5.2 % (08-18-20 @ 09:03)    Glucose:   BP: 105/60 (12-30-20 @ 08:41) (105/60 - 105/60)  Lipid Panel: Date/Time: 08-18-20 @ 09:03  Cholesterol, Serum: 135  Direct LDL: 84  HDL Cholesterol, Serum: 51  Total Cholesterol/HDL Ration Measurement: 2.6  Triglycerides, Serum: 48

## 2020-12-30 NOTE — BH INPATIENT PSYCHIATRY PROGRESS NOTE - NSBHATTESTSEENBY_PSY_A_CORE
attending Psychiatrist without NP/Trainee
attending Psychiatrist without NP/Trainee
NP without Attending Psychiatrist
attending Psychiatrist without NP/Trainee
attending Psychiatrist without NP/Trainee
NP without Attending Psychiatrist
NP without Attending Psychiatrist

## 2020-12-30 NOTE — BH INPATIENT PSYCHIATRY PROGRESS NOTE - NSBHCONSULTIPREASON_PSY_A_CORE
danger to self; mental illness expected to respond to inpatient care

## 2020-12-30 NOTE — BH INPATIENT PSYCHIATRY PROGRESS NOTE - NSBHATTENDATTEST_PSY_ALL_CORE
I have personally seen, examined and participated in the care of this patient. I have reviewed all pertinent clinical information, including history, physical exam, plan and the Medical/PA/NP Student’s note and agree except as noted.

## 2020-12-30 NOTE — BH INPATIENT PSYCHIATRY PROGRESS NOTE - NSBHMETABOLIC_PSY_ALL_CORE_FT
BMI: BMI (kg/m2): 19.6 (12-23-20 @ 02:36)  HbA1c: A1C with Estimated Average Glucose Result: 5.2 % (08-18-20 @ 09:03)    Glucose:   BP: --  Lipid Panel: Date/Time: 08-18-20 @ 09:03  Cholesterol, Serum: 135  Direct LDL: 84  HDL Cholesterol, Serum: 51  Total Cholesterol/HDL Ration Measurement: 2.6  Triglycerides, Serum: 48  
BMI: BMI (kg/m2): 19.6 (12-23-20 @ 02:36)  HbA1c: A1C with Estimated Average Glucose Result: 5.2 % (08-18-20 @ 09:03)    Glucose:   BP: 109/63 (12-25-20 @ 19:19) (109/63 - 109/63)  Lipid Panel: Date/Time: 08-18-20 @ 09:03  Cholesterol, Serum: 135  Direct LDL: 84  HDL Cholesterol, Serum: 51  Total Cholesterol/HDL Ration Measurement: 2.6  Triglycerides, Serum: 48  
BMI: BMI (kg/m2): 19.6 (12-23-20 @ 02:36)  HbA1c: A1C with Estimated Average Glucose Result: 5.2 % (08-18-20 @ 09:03)    Glucose:   BP: --  Lipid Panel: Date/Time: 08-18-20 @ 09:03  Cholesterol, Serum: 135  Direct LDL: 84  HDL Cholesterol, Serum: 51  Total Cholesterol/HDL Ration Measurement: 2.6  Triglycerides, Serum: 48  
BMI: BMI (kg/m2): 19.6 (12-23-20 @ 02:36)  HbA1c: A1C with Estimated Average Glucose Result: 5.2 % (08-18-20 @ 09:03)    Glucose:   BP: 109/63 (12-25-20 @ 19:19) (109/63 - 109/63)  Lipid Panel: Date/Time: 08-18-20 @ 09:03  Cholesterol, Serum: 135  Direct LDL: 84  HDL Cholesterol, Serum: 51  Total Cholesterol/HDL Ration Measurement: 2.6  Triglycerides, Serum: 48  
BMI: BMI (kg/m2): 19.6 (12-23-20 @ 02:36)  HbA1c: A1C with Estimated Average Glucose Result: 5.2 % (08-18-20 @ 09:03)    Glucose:   BP: --  Lipid Panel: Date/Time: 08-18-20 @ 09:03  Cholesterol, Serum: 135  Direct LDL: 84  HDL Cholesterol, Serum: 51  Total Cholesterol/HDL Ration Measurement: 2.6  Triglycerides, Serum: 48  
BMI: BMI (kg/m2): 19.6 (12-23-20 @ 02:36)  HbA1c: A1C with Estimated Average Glucose Result: 5.2 % (08-18-20 @ 09:03)    Glucose:   BP: 105/60 (12-30-20 @ 08:41) (105/60 - 105/60)  Lipid Panel: Date/Time: 08-18-20 @ 09:03  Cholesterol, Serum: 135  Direct LDL: 84  HDL Cholesterol, Serum: 51  Total Cholesterol/HDL Ration Measurement: 2.6  Triglycerides, Serum: 48  
BMI: BMI (kg/m2): 19.6 (12-23-20 @ 02:36)  HbA1c: A1C with Estimated Average Glucose Result: 5.2 % (08-18-20 @ 09:03)    Glucose:   BP: 109/63 (12-25-20 @ 19:19) (109/63 - 109/63)  Lipid Panel: Date/Time: 08-18-20 @ 09:03  Cholesterol, Serum: 135  Direct LDL: 84  HDL Cholesterol, Serum: 51  Total Cholesterol/HDL Ration Measurement: 2.6  Triglycerides, Serum: 48

## 2020-12-30 NOTE — BH DISCHARGE NOTE NURSING/SOCIAL WORK/PSYCH REHAB - NSDCPRGOAL_PSY_ALL_CORE
Upon admission, pt presented in the context of endorsing SI. Pt reported feeling sad for many years and was chronically suicidal. Pt reported experiencing thoughts of overdosing on any pills she had at home. Pt reported that coming to the hospital stopped her from engaging in any self harm behaviors. These feelings/thoughts may have been in the context of recent cocaine/crack usage 1 week prior to admission. While on the unit, pt submitted the retracted a 72 hour letter. Pt reports that she is feeling better and denies experiencing any SI/HI. Pt is future oriented and requested discharge. Pt reported that she utilized cocaine prior to admission. Pt denied experiencing any AH/VH/TH. It is noted that pt has a prior pattern of using cocaine, admitting herself to the hospital, the advocating for discharge when she re-compensates. Pt denies any outpatient help for cocaine usage despite being offered by team. Pt ADLs are well maintained independently.

## 2020-12-30 NOTE — BH INPATIENT PSYCHIATRY PROGRESS NOTE - NSTXDCOPNOGOAL_PSY_ALL_CORE
Will agree to participate in appropriate outpatient care

## 2020-12-30 NOTE — BH INPATIENT PSYCHIATRY PROGRESS NOTE - NSBHASSESSSUMMFT_PSY_ALL_CORE
Pt now reporting improved mood and denies SI/I/P. She is more organized and cooperative though a bit odd.  She is compliant with medication, eating and drinking and caring for herself.  There is no evidence of acute danger to herself or others.  She is very interested in discharge.  She does not meet criteria for involuntary hospitalization and is being discharged as above.

## 2020-12-30 NOTE — BH INPATIENT PSYCHIATRY PROGRESS NOTE - NSCGIIMPROVESX_PSY_ALL_CORE
2 = Much improved - notably better with signficant reduction of symptoms; increase in the level of functioning but some symptoms remain
2 = Much improved - notably better with signficant reduction of symptoms; increase in the level of functioning but some symptoms remain

## 2020-12-30 NOTE — BH DISCHARGE NOTE NURSING/SOCIAL WORK/PSYCH REHAB - PATIENT PORTAL LINK FT
You can access the FollowMyHealth Patient Portal offered by [facility] by registering at the following website: [email or web address]. By joining [email or web address]’s FollowMyHealth portal, you will also be able to view your health information using other applications (apps) compatible with our system.

## 2020-12-30 NOTE — BH INPATIENT PSYCHIATRY PROGRESS NOTE - NSBHMSELANG_PSY_A_CORE
No abnormalities noted
No abnormalities noted/Unable to assess
Unable to assess
No abnormalities noted/Unable to assess
Unable to assess
No abnormalities noted/Unable to assess
No abnormalities noted

## 2020-12-30 NOTE — BH INPATIENT PSYCHIATRY PROGRESS NOTE - NSTXPROBSLFCRE_PSY_ALL_CORE
SELF-CARE DEFICIT

## 2020-12-30 NOTE — BH INPATIENT PSYCHIATRY PROGRESS NOTE - NSTREATMENTCERTY_PSY_ALL_CORE

## 2020-12-30 NOTE — BH INPATIENT PSYCHIATRY PROGRESS NOTE - NSCGISEVERILLNESS_PSY_ALL_CORE
5 = Markedly ill - intrusive symptoms that distinctly impair social/occupational function or cause intrusive levels of distress
5 = Markedly ill - intrusive symptoms that distinctly impair social/occupational function or cause intrusive levels of distress
4 = Moderately ill – overt symptoms causing noticeable, but modest, functional impairment or distress; symptom level may warrant medication
4 = Moderately ill – overt symptoms causing noticeable, but modest, functional impairment or distress; symptom level may warrant medication

## 2020-12-30 NOTE — BH DISCHARGE NOTE NURSING/SOCIAL WORK/PSYCH REHAB - NSDCPRRECOMMEND_PSY_ALL_CORE
Upon discharge, pt would benefit from engaging in outpatient treatment at [facility] for continued medication and symptom management.

## 2020-12-30 NOTE — BH INPATIENT PSYCHIATRY PROGRESS NOTE - NSBHMSEINTELL_PSY_A_CORE
Unable to assess
Below Average
Below Average
Unable to assess

## 2020-12-30 NOTE — BH INPATIENT PSYCHIATRY PROGRESS NOTE - NSBHFUPINTERVALCCFT_PSY_A_CORE
Patient seen, chart reviewed, case discussed with team.  Patient seen for f/u depression and SI.
Patient seen for follow up for depression and substance use.  
"I'm ok"
Patient seen, chart reviewed, case dicsussed with team.  Patient seen for reported suicidal ideation and depressive symptoms in settting  of cocaine use.
Patient seen, chart reviewed, case discussed with team.  Patient seen for discharge day management off/u depression and SI.
Patient seen for follow up for depression and substance use.  
f/u depression and SI

## 2020-12-30 NOTE — BH INPATIENT PSYCHIATRY PROGRESS NOTE - NSBHINPTBILLING_PSY_ALL_CORE
17663 - Inpatient Moderate Complexity
07365 - Initial hospital care - moderate complexity
04332 - Inpatient Moderate Complexity
18807 - Inpatient Moderate Complexity
15930 - Inpatient Moderate Complexity
93737 - Inpatient Moderate Complexity
30652 - Inpatient Moderate Complexity

## 2020-12-30 NOTE — BH INPATIENT PSYCHIATRY PROGRESS NOTE - PRN MEDS
MEDICATIONS  (PRN):  fluPHENAZine  Injectable 5 milliGRAM(s) IntraMuscular once PRN severe agitation  LORazepam     Tablet 1 milliGRAM(s) Oral every 8 hours PRN anxiety  QUEtiapine 50 milliGRAM(s) Oral every 4 hours PRN agitation  
MEDICATIONS  (PRN):  fluPHENAZine  Injectable 5 milliGRAM(s) IntraMuscular once PRN severe agitation  LORazepam     Tablet 1 milliGRAM(s) Oral every 8 hours PRN anxiety  QUEtiapine 50 milliGRAM(s) Oral every 4 hours PRN agitation  
MEDICATIONS  (PRN):  fluPHENAZine  Injectable 5 milliGRAM(s) IntraMuscular once PRN severe agitation  LORazepam     Tablet 1 milliGRAM(s) Oral every 8 hours PRN anxiety  nicotine  Polacrilex Gum 4 milliGRAM(s) Oral every 2 hours PRN nicotine replacement  QUEtiapine 50 milliGRAM(s) Oral every 4 hours PRN agitation  
MEDICATIONS  (PRN):  fluPHENAZine  Injectable 5 milliGRAM(s) IntraMuscular once PRN severe agitation  hydrOXYzine hydrochloride 50 milliGRAM(s) Oral every 6 hours PRN anxiety  LORazepam     Tablet 1 milliGRAM(s) Oral every 8 hours PRN anxiety  nicotine  Polacrilex Gum 4 milliGRAM(s) Oral every 2 hours PRN nicotine replacement  QUEtiapine 50 milliGRAM(s) Oral every 4 hours PRN agitation  
MEDICATIONS  (PRN):  fluPHENAZine  Injectable 5 milliGRAM(s) IntraMuscular once PRN severe agitation  LORazepam     Tablet 1 milliGRAM(s) Oral every 8 hours PRN anxiety  nicotine  Polacrilex Gum 4 milliGRAM(s) Oral every 2 hours PRN nicotine replacement  QUEtiapine 50 milliGRAM(s) Oral every 4 hours PRN agitation  
MEDICATIONS  (PRN):  fluPHENAZine  Injectable 5 milliGRAM(s) IntraMuscular once PRN severe agitation  LORazepam     Tablet 1 milliGRAM(s) Oral every 8 hours PRN anxiety  QUEtiapine 50 milliGRAM(s) Oral every 4 hours PRN agitation  
MEDICATIONS  (PRN):  fluPHENAZine  Injectable 5 milliGRAM(s) IntraMuscular once PRN severe agitation  LORazepam     Tablet 1 milliGRAM(s) Oral every 8 hours PRN anxiety  QUEtiapine 50 milliGRAM(s) Oral every 4 hours PRN agitation

## 2020-12-30 NOTE — BH INPATIENT PSYCHIATRY PROGRESS NOTE - NSBHMSESPEECH_PSY_A_CORE
Normal volume, rate, productivity, spontaneity and articulation
Abnormal as indicated, otherwise normal...
Abnormal as indicated, otherwise normal...
Normal volume, rate, productivity, spontaneity and articulation
Abnormal as indicated, otherwise normal...
Normal volume, rate, productivity, spontaneity and articulation
Abnormal as indicated, otherwise normal...

## 2020-12-30 NOTE — BH INPATIENT PSYCHIATRY PROGRESS NOTE - NSTXSLFCREGOAL_PSY_ALL_CORE
Be able to demonstrate the ability to care for self in 2 areas such as feeding oneself and hygiene
Will perform ADLs without assistance/prompts daily
Be able to demonstrate the ability to care for self in 2 areas such as feeding oneself and hygiene
Be able to describe elements of self-care and a plan to attend to these elements
Be able to demonstrate the ability to care for self in 2 areas such as feeding oneself and hygiene

## 2020-12-30 NOTE — BH INPATIENT PSYCHIATRY PROGRESS NOTE - NSTXDEPRESGOAL_PSY_ALL_CORE
Exhibit improvements in self-grooming, hygiene, sleep and appetite
Will identify 2 coping skills that assist in improving mood
Exhibit improvements in self-grooming, hygiene, sleep and appetite
Exhibit improvements in self-grooming, hygiene, sleep and appetite
Will identify 2 coping skills that assist in improving mood

## 2020-12-30 NOTE — ED ADULT NURSE REASSESSMENT NOTE - NS ED NURSE REASSESS COMMENT FT1
Pt awake. provided with breakfast tray. VSS. constant observation maintained. no distress or complaints at this time. will cont to reassess.
Pt given lunch tray. resting comfortably in stretcher. constant observation maintained. no signs of distress. pending placement to psych facility.
Tele-psych cart at bedside for pt assessment/interview. Pt awake and cooperative at this time. 1:1 sitter remains in place. Will continue to monitor.
report received from nightshift TEE Schmid. constant observation maintained. pt sleeping in stretcher, pending psych re-eval. no signs of distress. pending urine specimen.
report to [place] [facility] given. pending transport arrival.
voluntary psych admit. constant observation maintained. will cont to reassess. pending bed placement,.
Telepsyche with patient; constant observation maintained
Detail Level: Simple
Instructions: This plan will send the code FBSE to the PM system.  DO NOT or CHANGE the price.
Price (Do Not Change): 0.00

## 2020-12-30 NOTE — BH INPATIENT PSYCHIATRY PROGRESS NOTE - NSBHFUPINTERVALHXFT_PSY_A_CORE
Staff report no inteval events, some prns around bedtime.  She more or less slept although she was awake for about an hour.  She is eating and drinking appropriately though she is more or less isoaltive on the unit.  She continues to desire to be discharge and declines substance treatment.  She is more organized, more cooperative and there is no evidence of acute suicidality, depression, faraz or psychosis.  She is not an acute danger to herself or others, desires to leave the hospital and is being discharged with a thirty day supply og quetiapine 100 q am and 200 at beditme,hydroxyzine 50 prn and melatonin 50 prn.

## 2020-12-30 NOTE — BH INPATIENT PSYCHIATRY PROGRESS NOTE - NSBHMSEKNOW_PSY_A_CORE
Unable to assess

## 2020-12-30 NOTE — BH INPATIENT PSYCHIATRY DISCHARGE NOTE - OTHER PAST PSYCHIATRIC HISTORY (INCLUDE DETAILS REGARDING ONSET, COURSE OF ILLNESS, INPATIENT/OUTPATIENT TREATMENT)
Patient has a history of multiple admissions, was apparently on AOT in the past 12/2018-12/2019) but appears to no longer be on AOT.  Multiple inpatient admissions at [facility], [facility], also apparently many iinpatient admissions at [facility].  Multiple admissions for .

## 2020-12-30 NOTE — BH INPATIENT PSYCHIATRY PROGRESS NOTE - NSTXCOPEGOALOTHER_PSY_ALL_CORE
Patient will identify 1-2 effective coping methods for improved symptom management on the unit and in the community.
Patient will identify 1-2 effective coping methods for improved symptom management on the unit and in the community.
Patient will identify 2 coping skills
Patient will identify 1-2 effective coping methods for improved symptom management on the unit and in the community.

## 2020-12-30 NOTE — BH INPATIENT PSYCHIATRY PROGRESS NOTE - CURRENT MEDICATION
MEDICATIONS  (STANDING):  melatonin. 5 milliGRAM(s) Oral at bedtime  QUEtiapine 100 milliGRAM(s) Oral daily  QUEtiapine 200 milliGRAM(s) Oral at bedtime    MEDICATIONS  (PRN):  fluPHENAZine  Injectable 5 milliGRAM(s) IntraMuscular once PRN severe agitation  LORazepam     Tablet 1 milliGRAM(s) Oral every 8 hours PRN anxiety  nicotine  Polacrilex Gum 4 milliGRAM(s) Oral every 2 hours PRN nicotine replacement  QUEtiapine 50 milliGRAM(s) Oral every 4 hours PRN agitation  
MEDICATIONS  (STANDING):  melatonin. 5 milliGRAM(s) Oral at bedtime  QUEtiapine 100 milliGRAM(s) Oral two times a day    MEDICATIONS  (PRN):  fluPHENAZine  Injectable 5 milliGRAM(s) IntraMuscular once PRN severe agitation  LORazepam     Tablet 1 milliGRAM(s) Oral every 8 hours PRN anxiety  QUEtiapine 50 milliGRAM(s) Oral every 4 hours PRN agitation  
MEDICATIONS  (STANDING):  melatonin. 5 milliGRAM(s) Oral at bedtime  QUEtiapine 100 milliGRAM(s) Oral daily  QUEtiapine 200 milliGRAM(s) Oral at bedtime    MEDICATIONS  (PRN):  fluPHENAZine  Injectable 5 milliGRAM(s) IntraMuscular once PRN severe agitation  LORazepam     Tablet 1 milliGRAM(s) Oral every 8 hours PRN anxiety  QUEtiapine 50 milliGRAM(s) Oral every 4 hours PRN agitation  
MEDICATIONS  (STANDING):  melatonin. 5 milliGRAM(s) Oral at bedtime  QUEtiapine 100 milliGRAM(s) Oral daily  QUEtiapine 200 milliGRAM(s) Oral at bedtime    MEDICATIONS  (PRN):  fluPHENAZine  Injectable 5 milliGRAM(s) IntraMuscular once PRN severe agitation  hydrOXYzine hydrochloride 50 milliGRAM(s) Oral every 6 hours PRN anxiety  LORazepam     Tablet 1 milliGRAM(s) Oral every 8 hours PRN anxiety  nicotine  Polacrilex Gum 4 milliGRAM(s) Oral every 2 hours PRN nicotine replacement  QUEtiapine 50 milliGRAM(s) Oral every 4 hours PRN agitation  
MEDICATIONS  (STANDING):  melatonin. 5 milliGRAM(s) Oral at bedtime  QUEtiapine 100 milliGRAM(s) Oral two times a day    MEDICATIONS  (PRN):  fluPHENAZine  Injectable 5 milliGRAM(s) IntraMuscular once PRN severe agitation  LORazepam     Tablet 1 milliGRAM(s) Oral every 8 hours PRN anxiety  QUEtiapine 50 milliGRAM(s) Oral every 4 hours PRN agitation  
MEDICATIONS  (STANDING):  melatonin. 5 milliGRAM(s) Oral at bedtime  QUEtiapine 100 milliGRAM(s) Oral daily  QUEtiapine 200 milliGRAM(s) Oral at bedtime    MEDICATIONS  (PRN):  fluPHENAZine  Injectable 5 milliGRAM(s) IntraMuscular once PRN severe agitation  LORazepam     Tablet 1 milliGRAM(s) Oral every 8 hours PRN anxiety  nicotine  Polacrilex Gum 4 milliGRAM(s) Oral every 2 hours PRN nicotine replacement  QUEtiapine 50 milliGRAM(s) Oral every 4 hours PRN agitation  
MEDICATIONS  (STANDING):  melatonin. 5 milliGRAM(s) Oral at bedtime  QUEtiapine 100 milliGRAM(s) Oral daily  QUEtiapine 200 milliGRAM(s) Oral at bedtime    MEDICATIONS  (PRN):  fluPHENAZine  Injectable 5 milliGRAM(s) IntraMuscular once PRN severe agitation  LORazepam     Tablet 1 milliGRAM(s) Oral every 8 hours PRN anxiety  QUEtiapine 50 milliGRAM(s) Oral every 4 hours PRN agitation

## 2020-12-30 NOTE — BH INPATIENT PSYCHIATRY DISCHARGE NOTE - NSDCMRMEDTOKEN_GEN_ALL_CORE_FT
hydrOXYzine hydrochloride 50 mg oral tablet: 1 tab(s) orally once a day, As Needed -anxiety   melatonin 5 mg oral tablet: 1 tab(s) orally once a day (at bedtime)  QUEtiapine 100 mg oral tablet: 1 tab(s) orally in the morning and 2 tabs orally at bedtime

## 2020-12-30 NOTE — BH INPATIENT PSYCHIATRY PROGRESS NOTE - NSBHPSYCHOLCOGORIENT_PSY_A_CORE
Oriented to time, place, person, situation
Unable to assess
Unable to assess
Oriented to time, place, person, situation

## 2020-12-30 NOTE — BH INPATIENT PSYCHIATRY DISCHARGE NOTE - NSDCCPCAREPLAN_GEN_ALL_CORE_FT
PRINCIPAL DISCHARGE DIAGNOSIS  Diagnosis: Substance induced mood disorder  Assessment and Plan of Treatment:       SECONDARY DISCHARGE DIAGNOSES  Diagnosis: Schizoaffective disorder  Assessment and Plan of Treatment:

## 2020-12-30 NOTE — BH INPATIENT PSYCHIATRY PROGRESS NOTE - MSE OPTIONS
Structured MSE

## 2020-12-30 NOTE — BH INPATIENT PSYCHIATRY PROGRESS NOTE - NSBHMSEPERCEPT_PSY_A_CORE
No abnormalities
No abnormalities/Other
No abnormalities
No abnormalities/Other
No abnormalities

## 2020-12-30 NOTE — BH INPATIENT PSYCHIATRY DISCHARGE NOTE - HOSPITAL COURSE
She was admitted to the inpatient service on a voluntary legal status verbalizing suicidal ideation.  Her utox was +cocaine.  She was initially irritable and isolative, uncooperative and seemingly disorganized on the unit.  THis was consistent with a diagnosis of cocaine intoxication progressing to withdrawal.  SHe was given quetiapine titrated to 100mg q am and 200mg at bedtime at her request.  We noted long acting injectables in her past and offered her aripiprazole, risepridone and palieperidone instead, but she declined and wanted quetiapine.  She submitted a 72 hour letter over the weekend after admission and then retracted it but was still preoccupied with discharge.      She came to dney si/hi intents or plans and was more cooperative and organized on the unit.  She was more comfortable and future oriented and there was no evidence of helplessnes, opelessness, psychic anxiety.  SHe denied hallucinations and delusions and, although some were suspected, none were frankly noted and if they were present, she did not appear to be disturbed or troubled by them.  She desired discharge and was here on a voluntary status.  She declined treatment for subtance abuse/rehab.      Her acute presenting symptoms improved, she did not meet criteria for involuntary hospitalization and she was discharged with a thirty day supply of quetiapine 100/200, melatonin 5 at bedtime and atarax 50mg prn.

## 2020-12-30 NOTE — BH INPATIENT PSYCHIATRY PROGRESS NOTE - NSBHCHARTREVIEWVS_PSY_A_CORE FT
Vital Signs Last 24 Hrs  T(C): 36.1 (28 Dec 2020 08:49), Max: 36.5 (27 Dec 2020 19:21)  T(F): 97 (28 Dec 2020 08:49), Max: 97.7 (27 Dec 2020 19:21)    Orthostatic VS    12-28-20 @ 08:49  Lying BP: --/-- HR: --   Sitting BP: 90/62 HR: 66  Standing BP: --/-- HR: --  Site: --   Mode: --  
Vital Signs Last 24 Hrs  T(C): 35.9 (24 Dec 2020 19:22), Max: 35.9 (24 Dec 2020 19:22)  T(F): 96.6 (24 Dec 2020 19:22), Max: 96.6 (24 Dec 2020 19:22)  HR: --  BP: --  BP(mean): --  RR: --  SpO2: --
Vital Signs Last 24 Hrs  T(C): 36.4 (26 Dec 2020 19:47), Max: 36.4 (26 Dec 2020 19:47)  T(F): 97.5 (26 Dec 2020 19:47), Max: 97.5 (26 Dec 2020 19:47)  HR: --  BP: --  BP(mean): --  RR: --  SpO2: --
Vital Signs Last 24 Hrs  T(C): 36.4 (30 Dec 2020 08:41), Max: 36.4 (30 Dec 2020 08:41)  T(F): 97.5 (30 Dec 2020 08:41), Max: 97.5 (30 Dec 2020 08:41)  HR: 73 (30 Dec 2020 08:41) (73 - 73)  BP: 105/60 (30 Dec 2020 08:41) (105/60 - 105/60)  BP(mean): --  RR: --  SpO2: --
Vital Signs Last 24 Hrs  T(C): 36.8 (25 Dec 2020 19:19), Max: 36.8 (25 Dec 2020 19:19)  T(F): 98.2 (25 Dec 2020 19:19), Max: 98.2 (25 Dec 2020 19:19)  HR: 108 (25 Dec 2020 19:19) (108 - 108)  BP: 109/63 (25 Dec 2020 19:19) (109/63 - 109/63)  BP(mean): --  RR: --  SpO2: --
Vital Signs Last 24 Hrs  T(C): 36.3 (24 Dec 2020 07:57), Max: 37.3 (23 Dec 2020 19:11)  T(F): 97.4 (24 Dec 2020 07:57), Max: 99.2 (23 Dec 2020 19:11)  HR: 76 (23 Dec 2020 19:11) (69 - 76)  BP: 111/72 (23 Dec 2020 19:11) (109/69 - 111/72)  BP(mean): --  RR: 18 (23 Dec 2020 19:11) (17 - 18)  SpO2: 100% (23 Dec 2020 19:11) (99% - 100%)
Vital Signs Last 24 Hrs  T(C): 36.1 (29 Dec 2020 08:29), Max: 37.1 (28 Dec 2020 19:53)  T(F): 97 (29 Dec 2020 08:29), Max: 98.7 (28 Dec 2020 19:53)    Orthostatic VS    12-29-20 @ 08:29  Lying BP: --/-- HR: --   Sitting BP: 95/60 HR: 56  Standing BP: --/-- HR: --  Site: --   Mode: --

## 2020-12-30 NOTE — BH INPATIENT PSYCHIATRY PROGRESS NOTE - NSICDXBHPRIMARYDX_PSY_ALL_CORE
Substance induced mood disorder   F19.49  
Substance induced mood disorder   F19.26  
Substance induced mood disorder   F19.10  
Substance induced mood disorder   F19.57  
Substance induced mood disorder   F19.04  
Substance induced mood disorder   F19.52  
Substance induced mood disorder   F19.89

## 2020-12-30 NOTE — BH INPATIENT PSYCHIATRY PROGRESS NOTE - NSTXDEPRESINTERMD_PSY_ALL_CORE
patient on quetiapine 100mg daily and 200mg QHS
patient on quetiapine 100mg twice daily
patient on quetiapine 100mg daily and 200mg QHS
patient on quetiapine 100mg twice daily
patient on quetiapine 100mg daily and 200mg QHS

## 2020-12-30 NOTE — BH DISCHARGE NOTE NURSING/SOCIAL WORK/PSYCH REHAB - NSCDUDCCRISIS_PSY_A_CORE
[facility]  1 [phone] [facility]-WELL ([phone])  Text "WELL" to [phone]  Website: www.[employer]/.[employer] 1 (350) 861-SXWN (3291) Website: www.safehorizon.org/.National Suicide Prevention Lifeline 7 (043) 250-1154/.  [employer]  1 (081) LIF[employer] (995-2797)/.  [facility]  [address] [place] [address] [phone]   Hours:  Monday through Friday from 9 AM to 3 PM/.  [employer] - [facility] Crisis Line  [phone], Option 1

## 2020-12-30 NOTE — BH INPATIENT PSYCHIATRY PROGRESS NOTE - NSTXPROBCOPE_PSY_ALL_CORE
COPING, INEFFECTIVE

## 2020-12-30 NOTE — BH INPATIENT PSYCHIATRY PROGRESS NOTE - NSTXPROBDCOPNO_PSY_ALL_CORE
DISCHARGE ISSUE - NON-ADHERENT WITH OUTPATIENT SERVICES

## 2020-12-30 NOTE — BH INPATIENT PSYCHIATRY PROGRESS NOTE - NSBHMSETHTCONTENT_PSY_A_CORE
Unable to assess
Unremarkable
Unable to assess
Unremarkable
Unremarkable

## 2021-01-09 ENCOUNTER — EMERGENCY (EMERGENCY)
Facility: HOSPITAL | Age: 41
LOS: 1 days | Discharge: HOME | End: 2021-01-09
Attending: EMERGENCY MEDICINE | Admitting: EMERGENCY MEDICINE
Payer: MEDICARE

## 2021-01-09 VITALS
SYSTOLIC BLOOD PRESSURE: 136 MMHG | OXYGEN SATURATION: 100 % | DIASTOLIC BLOOD PRESSURE: 81 MMHG | WEIGHT: 134.92 LBS | TEMPERATURE: 97 F | HEART RATE: 60 BPM | RESPIRATION RATE: 16 BRPM | HEIGHT: 68 IN

## 2021-01-09 DIAGNOSIS — F32.9 MAJOR DEPRESSIVE DISORDER, SINGLE EPISODE, UNSPECIFIED: ICD-10-CM

## 2021-01-09 DIAGNOSIS — R45.851 SUICIDAL IDEATIONS: ICD-10-CM

## 2021-01-09 DIAGNOSIS — F33.9 MAJOR DEPRESSIVE DISORDER, RECURRENT, UNSPECIFIED: ICD-10-CM

## 2021-01-09 DIAGNOSIS — Z20.822 CONTACT WITH AND (SUSPECTED) EXPOSURE TO COVID-19: ICD-10-CM

## 2021-01-09 DIAGNOSIS — F14.10 COCAINE ABUSE, UNCOMPLICATED: ICD-10-CM

## 2021-01-09 LAB
ANION GAP SERPL CALC-SCNC: 11 MMOL/L — SIGNIFICANT CHANGE UP (ref 5–17)
APAP SERPL-MCNC: <5 UG/ML — LOW (ref 10–30)
APPEARANCE UR: CLEAR — SIGNIFICANT CHANGE UP
BASOPHILS # BLD AUTO: 0.02 K/UL — SIGNIFICANT CHANGE UP (ref 0–0.2)
BASOPHILS NFR BLD AUTO: 0.5 % — SIGNIFICANT CHANGE UP (ref 0–2)
BILIRUB UR-MCNC: NEGATIVE — SIGNIFICANT CHANGE UP
BUN SERPL-MCNC: 15 MG/DL — SIGNIFICANT CHANGE UP (ref 7–23)
CALCIUM SERPL-MCNC: 9 MG/DL — SIGNIFICANT CHANGE UP (ref 8.4–10.5)
CHLORIDE SERPL-SCNC: 106 MMOL/L — SIGNIFICANT CHANGE UP (ref 96–108)
CO2 SERPL-SCNC: 23 MMOL/L — SIGNIFICANT CHANGE UP (ref 22–31)
COLOR SPEC: YELLOW — SIGNIFICANT CHANGE UP
CREAT SERPL-MCNC: 1.11 MG/DL — SIGNIFICANT CHANGE UP (ref 0.5–1.3)
DIFF PNL FLD: NEGATIVE — SIGNIFICANT CHANGE UP
EOSINOPHIL # BLD AUTO: 0.24 K/UL — SIGNIFICANT CHANGE UP (ref 0–0.5)
EOSINOPHIL NFR BLD AUTO: 6.5 % — HIGH (ref 0–6)
ETHANOL SERPL-MCNC: <10 MG/DL — SIGNIFICANT CHANGE UP (ref 0–10)
GLUCOSE SERPL-MCNC: 78 MG/DL — SIGNIFICANT CHANGE UP (ref 70–99)
GLUCOSE UR QL: NEGATIVE — SIGNIFICANT CHANGE UP
HCG UR QL: NEGATIVE — SIGNIFICANT CHANGE UP
HCT VFR BLD CALC: 35 % — SIGNIFICANT CHANGE UP (ref 34.5–45)
HGB BLD-MCNC: 11.1 G/DL — LOW (ref 11.5–15.5)
IMM GRANULOCYTES NFR BLD AUTO: 0.3 % — SIGNIFICANT CHANGE UP (ref 0–1.5)
KETONES UR-MCNC: NEGATIVE — SIGNIFICANT CHANGE UP
LEUKOCYTE ESTERASE UR-ACNC: NEGATIVE — SIGNIFICANT CHANGE UP
LYMPHOCYTES # BLD AUTO: 1.55 K/UL — SIGNIFICANT CHANGE UP (ref 1–3.3)
LYMPHOCYTES # BLD AUTO: 42.1 % — SIGNIFICANT CHANGE UP (ref 13–44)
MCHC RBC-ENTMCNC: 28.9 PG — SIGNIFICANT CHANGE UP (ref 27–34)
MCHC RBC-ENTMCNC: 31.7 GM/DL — LOW (ref 32–36)
MCV RBC AUTO: 91.1 FL — SIGNIFICANT CHANGE UP (ref 80–100)
MONOCYTES # BLD AUTO: 0.43 K/UL — SIGNIFICANT CHANGE UP (ref 0–0.9)
MONOCYTES NFR BLD AUTO: 11.7 % — SIGNIFICANT CHANGE UP (ref 2–14)
NEUTROPHILS # BLD AUTO: 1.43 K/UL — LOW (ref 1.8–7.4)
NEUTROPHILS NFR BLD AUTO: 38.9 % — LOW (ref 43–77)
NITRITE UR-MCNC: NEGATIVE — SIGNIFICANT CHANGE UP
NRBC # BLD: 0 /100 WBCS — SIGNIFICANT CHANGE UP (ref 0–0)
PH UR: 7.5 — SIGNIFICANT CHANGE UP (ref 5–8)
PLATELET # BLD AUTO: 255 K/UL — SIGNIFICANT CHANGE UP (ref 150–400)
POTASSIUM SERPL-MCNC: 4.2 MMOL/L — SIGNIFICANT CHANGE UP (ref 3.5–5.3)
POTASSIUM SERPL-SCNC: 4.2 MMOL/L — SIGNIFICANT CHANGE UP (ref 3.5–5.3)
PROT UR-MCNC: NEGATIVE MG/DL — SIGNIFICANT CHANGE UP
RBC # BLD: 3.84 M/UL — SIGNIFICANT CHANGE UP (ref 3.8–5.2)
RBC # FLD: 15.2 % — HIGH (ref 10.3–14.5)
SALICYLATES SERPL-MCNC: <0.3 MG/DL — LOW (ref 2.8–20)
SARS-COV-2 RNA SPEC QL NAA+PROBE: SIGNIFICANT CHANGE UP
SODIUM SERPL-SCNC: 140 MMOL/L — SIGNIFICANT CHANGE UP (ref 135–145)
SP GR SPEC: 1.02 — SIGNIFICANT CHANGE UP (ref 1–1.03)
UROBILINOGEN FLD QL: 2 E.U./DL
WBC # BLD: 3.68 K/UL — LOW (ref 3.8–10.5)
WBC # FLD AUTO: 3.68 K/UL — LOW (ref 3.8–10.5)

## 2021-01-09 PROCEDURE — 86769 SARS-COV-2 COVID-19 ANTIBODY: CPT

## 2021-01-09 PROCEDURE — 36415 COLL VENOUS BLD VENIPUNCTURE: CPT

## 2021-01-09 PROCEDURE — 93005 ELECTROCARDIOGRAM TRACING: CPT

## 2021-01-09 PROCEDURE — 99285 EMERGENCY DEPT VISIT HI MDM: CPT

## 2021-01-09 PROCEDURE — 99283 EMERGENCY DEPT VISIT LOW MDM: CPT

## 2021-01-09 PROCEDURE — 93010 ELECTROCARDIOGRAM REPORT: CPT

## 2021-01-09 PROCEDURE — U0003: CPT

## 2021-01-09 PROCEDURE — 81025 URINE PREGNANCY TEST: CPT

## 2021-01-09 PROCEDURE — 85025 COMPLETE CBC W/AUTO DIFF WBC: CPT

## 2021-01-09 PROCEDURE — 81003 URINALYSIS AUTO W/O SCOPE: CPT

## 2021-01-09 PROCEDURE — 80307 DRUG TEST PRSMV CHEM ANLYZR: CPT

## 2021-01-09 PROCEDURE — 80048 BASIC METABOLIC PNL TOTAL CA: CPT

## 2021-01-09 PROCEDURE — 90792 PSYCH DIAG EVAL W/MED SRVCS: CPT

## 2021-01-09 PROCEDURE — U0005: CPT

## 2021-01-09 NOTE — ED ADULT NURSE NOTE - PAIN: PRESENCE, MLM
How Severe Is Your Rash?: mild
Is This A New Presentation, Or A Follow-Up?: Rash
denies pain/discomfort

## 2021-01-09 NOTE — ED PROVIDER NOTE - OBJECTIVE STATEMENT
40 year old female with previously documented substance induced mood disorder, suicide attempts, depression presents to ED with concern for feeling depressed and suicidal.  Patient requesting voluntary admission, stating she does not feel safe at home and wishes to "take a bunch of pills."  She denies any acute medical complaints or concerns when questioned.

## 2021-01-09 NOTE — ED PROVIDER NOTE - NS ED ROS FT
Constitutional: No fever or chills.   Eyes: No pain, blurry vision, or discharge.  ENMT: No hearing changes, pain, discharge or infections. No neck pain or stiffness.  Cardiac: No chest pain, SOB or edema. No chest pain with exertion.  Respiratory: No cough or respiratory distress. No hemoptysis. No history of asthma or RAD.  GI: No nausea, vomiting, diarrhea or abdominal pain.  : No dysuria, frequency or burning.  MS: No myalgia, muscle weakness, joint pain or back pain.  Neuro: No headache or weakness. No LOC.  Skin: No skin rash.   Psych:  + Substance induced mood disorder, + depression

## 2021-01-09 NOTE — ED ADULT TRIAGE NOTE - CHIEF COMPLAINT QUOTE
Pt states, "I feel suicidal and I need to go to psych." States she plans to "take a bunch of pills." Denies HI, hallucinations. Flat affect and slow to respond, but calm and cooperative in triage.

## 2021-01-09 NOTE — ED BEHAVIORAL HEALTH ASSESSMENT NOTE - SUMMARY
40 year old female who presents to ED self referred with c/o planning to kill self by overdose (nonspecific medication) but wants help instead.  Patient is a very poor historian with poor recall.  Patient reports recent discharge but no aftercare and unable to report personal information.
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
## 2021-01-09 NOTE — ED PROVIDER NOTE - CLINICAL SUMMARY MEDICAL DECISION MAKING FREE TEXT BOX
Patient presents to ED with concern for suicidal thoughts and request for voluntary admission.  Patient is medically cleared and psychiatrist in ED to evaluate.  As per psych, patient to be admitted to in patient psychiatric unit.  Psychiatry stating all paperwork completed at this time and awaiting bed/placement.  Patient is aware of plan and remains in agreement.  Following shift completion, patient's care is handed over to Dr. Sanders and PARAM Gilliam.  Patient stable at time of transfer of care.

## 2021-01-09 NOTE — ED ADULT NURSE NOTE - OBJECTIVE STATEMENT
41 y/o female w/ recent inpatient psych admission, hx of substance induced mood disorder, suicide attempt, and depression presents to the ED c/o depression and suicidal ideation w/ plan to "take a bunch of pills". Pt requesting voluntary admission. Denies fever, chills, CP, SOB, NVD, abdominal pain, HI, AH, VH.

## 2021-01-09 NOTE — ED PROVIDER NOTE - PHYSICAL EXAMINATION
VITAL SIGNS: I have reviewed nursing notes and confirm.  CONSTITUTIONAL: Well-developed; well-nourished; in no acute distress.   SKIN:  warm and dry, no acute rash.   HEAD:  normocephalic, atraumatic.  EYES: PERRL.  EOM intact; conjunctiva and sclera clear.  ENT: No nasal discharge; airway clear.   NECK: Supple; non tender.  CARD: S1, S2 normal; no murmurs, gallops, or rubs. Regular rate and rhythm.   RESP:  Clear to auscultation b/l, no wheezes, rales or rhonchi.  ABD: Normal bowel sounds; soft; non-distended; non-tender; no guarding/rebound.  EXT: Normal ROM. No clubbing, cyanosis or edema. 2+ pulses to b/l ue/le.  NEURO: Alert, oriented, grossly unremarkable.  5/5 strength x 4 extremities against gravity and external force.  No drift x 4 extremities.  Sensation intact and symmetric x 4 extremities.  No facial asymmetry.    PSYCH: + Suicidal, + flat affect

## 2021-01-10 ENCOUNTER — INPATIENT (INPATIENT)
Facility: HOSPITAL | Age: 41
LOS: 10 days | Discharge: HOME | End: 2021-01-21
Attending: PSYCHIATRY & NEUROLOGY | Admitting: PSYCHIATRY & NEUROLOGY
Payer: MEDICARE

## 2021-01-10 VITALS
HEIGHT: 69 IN | SYSTOLIC BLOOD PRESSURE: 127 MMHG | WEIGHT: 134.92 LBS | TEMPERATURE: 98 F | RESPIRATION RATE: 18 BRPM | DIASTOLIC BLOOD PRESSURE: 80 MMHG | HEART RATE: 102 BPM

## 2021-01-10 VITALS
SYSTOLIC BLOOD PRESSURE: 106 MMHG | RESPIRATION RATE: 18 BRPM | TEMPERATURE: 98 F | DIASTOLIC BLOOD PRESSURE: 62 MMHG | HEART RATE: 78 BPM | OXYGEN SATURATION: 99 %

## 2021-01-10 DIAGNOSIS — F14.10 COCAINE ABUSE, UNCOMPLICATED: ICD-10-CM

## 2021-01-10 DIAGNOSIS — R45.851 SUICIDAL IDEATIONS: ICD-10-CM

## 2021-01-10 DIAGNOSIS — F32.9 MAJOR DEPRESSIVE DISORDER, SINGLE EPISODE, UNSPECIFIED: ICD-10-CM

## 2021-01-10 PROCEDURE — 99231 SBSQ HOSP IP/OBS SF/LOW 25: CPT

## 2021-01-10 RX ORDER — LANOLIN ALCOHOL/MO/W.PET/CERES
5 CREAM (GRAM) TOPICAL AT BEDTIME
Refills: 0 | Status: DISCONTINUED | OUTPATIENT
Start: 2021-01-10 | End: 2021-01-11

## 2021-01-10 RX ORDER — QUETIAPINE FUMARATE 200 MG/1
50 TABLET, FILM COATED ORAL EVERY 4 HOURS
Refills: 0 | Status: DISCONTINUED | OUTPATIENT
Start: 2021-01-10 | End: 2021-01-11

## 2021-01-10 RX ADMIN — Medication 1 MILLIGRAM(S): at 20:27

## 2021-01-10 RX ADMIN — QUETIAPINE FUMARATE 50 MILLIGRAM(S): 200 TABLET, FILM COATED ORAL at 20:27

## 2021-01-10 RX ADMIN — Medication 1 MILLIGRAM(S): at 18:45

## 2021-01-10 NOTE — H&P ADULT - NSHPLABSRESULTS_GEN_ALL_CORE
`                        11.1   3.68  )-----------( 255      ( 2021 10:54 )             35.0       01-09    140  |  106  |  15  ----------------------------<  78  4.2   |  23  |  1.11    Ca    9.0      2021 10:54                Urinalysis Basic - ( 2021 14:02 )    Color: Yellow / Appearance: Clear / S.020 / pH: x  Gluc: x / Ketone: NEGATIVE  / Bili: Negative / Urobili: 2.0 E.U./dL   Blood: x / Protein: NEGATIVE mg/dL / Nitrite: NEGATIVE   Leuk Esterase: NEGATIVE / RBC: x / WBC x   Sq Epi: x / Non Sq Epi: x / Bacteria: x            Lactate Trend            CAPILLARY BLOOD GLUCOSE
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
## 2021-01-10 NOTE — PROGRESS NOTE BEHAVIORAL HEALTH - SUMMARY
40 year old female who presents to ED self referred with c/o planning to kill self by overdose (nonspecific medication) but wants help instead.  Patient continue to be very poor historian with poor recall.  Patient  needs further evaluation and treatment

## 2021-01-10 NOTE — H&P ADULT - NSHPPHYSICALEXAM_GEN_ALL_CORE
Vital Signs Last 24 Hrs  T(C): 36.7 (01-10-21 @ 03:17)  T(F): 98.1 (01-10-21 @ 03:17), Max: 98.3 (01-09-21 @ 10:45)  HR: 78 (01-10-21 @ 03:17) (60 - 78)  BP: 106/62 (01-10-21 @ 03:17)  BP(mean): --  RR: 18 (01-10-21 @ 03:17) (16 - 18)  SpO2: 99% (01-10-21 @ 03:17) (99% - 100%)  Wt(kg): --

## 2021-01-10 NOTE — ED BEHAVIORAL HEALTH NOTE - BEHAVIORAL HEALTH NOTE
Received patient in sign out.   Briefly:   40 year old female (who reported being 45 y.o.) presents to ED requesting voluntary admission for feeling suicidal and planning to overdose on any pills she can get her hands on.  Patient reports being admitted last month at [facility] and being discharged without any aftercare.     On my exam the patient was irritable (though she had just been woken up), did not want to engage in interivew or safety planning, and continued to report suicidal ideation.     Based on assessment by previous psychiatrist as well as myself, pt would benefit from inpatient hospitalization for safety and stabilization. Will be transferred to [facility] hospital this evening.

## 2021-01-10 NOTE — H&P ADULT - HISTORY OF PRESENT ILLNESS
40 year old female with previously documented substance induced mood disorder, suicide attempts, depression presents to ED with concern for feeling depressed and suicidal.  Patient requesting voluntary admission, stating she does not feel safe at home and wishes to "take a bunch of pills."  She denies any acute medical complaints or concerns when questioned

## 2021-01-11 DIAGNOSIS — F14.10 COCAINE ABUSE, UNCOMPLICATED: ICD-10-CM

## 2021-01-11 DIAGNOSIS — F19.94 OTHER PSYCHOACTIVE SUBSTANCE USE, UNSPECIFIED WITH PSYCHOACTIVE SUBSTANCE-INDUCED MOOD DISORDER: ICD-10-CM

## 2021-01-11 DIAGNOSIS — F25.9 SCHIZOAFFECTIVE DISORDER, UNSPECIFIED: ICD-10-CM

## 2021-01-11 LAB
AMPHET UR-MCNC: NEGATIVE — SIGNIFICANT CHANGE UP
APPEARANCE UR: CLEAR — SIGNIFICANT CHANGE UP
BARBITURATES UR SCN-MCNC: NEGATIVE — SIGNIFICANT CHANGE UP
BENZODIAZ UR-MCNC: NEGATIVE — SIGNIFICANT CHANGE UP
BILIRUB UR-MCNC: NEGATIVE — SIGNIFICANT CHANGE UP
COCAINE METAB.OTHER UR-MCNC: POSITIVE
COLOR SPEC: YELLOW — SIGNIFICANT CHANGE UP
DIFF PNL FLD: NEGATIVE — SIGNIFICANT CHANGE UP
DRUG SCREEN 1, URINE RESULT: SIGNIFICANT CHANGE UP
GLUCOSE UR QL: NEGATIVE MG/DL — SIGNIFICANT CHANGE UP
KETONES UR-MCNC: NEGATIVE — SIGNIFICANT CHANGE UP
LEUKOCYTE ESTERASE UR-ACNC: NEGATIVE — SIGNIFICANT CHANGE UP
METHADONE UR-MCNC: NEGATIVE — SIGNIFICANT CHANGE UP
NITRITE UR-MCNC: NEGATIVE — SIGNIFICANT CHANGE UP
OPIATES UR-MCNC: NEGATIVE — SIGNIFICANT CHANGE UP
PCP UR-MCNC: NEGATIVE — SIGNIFICANT CHANGE UP
PH UR: 6 — SIGNIFICANT CHANGE UP (ref 5–8)
PROPOXYPHENE QUALITATIVE URINE RESULT: NEGATIVE — SIGNIFICANT CHANGE UP
PROT UR-MCNC: NEGATIVE MG/DL — SIGNIFICANT CHANGE UP
SP GR SPEC: 1.02 — SIGNIFICANT CHANGE UP (ref 1.01–1.03)
THC UR QL: NEGATIVE — SIGNIFICANT CHANGE UP
UROBILINOGEN FLD QL: 0.2 MG/DL — SIGNIFICANT CHANGE UP (ref 0.2–0.2)

## 2021-01-11 PROCEDURE — 99232 SBSQ HOSP IP/OBS MODERATE 35: CPT

## 2021-01-11 RX ORDER — QUETIAPINE FUMARATE 200 MG/1
100 TABLET, FILM COATED ORAL
Refills: 0 | Status: DISCONTINUED | OUTPATIENT
Start: 2021-01-11 | End: 2021-01-12

## 2021-01-11 RX ORDER — NICOTINE POLACRILEX 2 MG
2 GUM BUCCAL
Refills: 0 | Status: DISCONTINUED | OUTPATIENT
Start: 2021-01-11 | End: 2021-01-21

## 2021-01-11 RX ORDER — HYDROXYZINE HCL 10 MG
50 TABLET ORAL EVERY 6 HOURS
Refills: 0 | Status: DISCONTINUED | OUTPATIENT
Start: 2021-01-11 | End: 2021-01-21

## 2021-01-11 RX ORDER — LANOLIN ALCOHOL/MO/W.PET/CERES
5 CREAM (GRAM) TOPICAL
Refills: 0 | Status: DISCONTINUED | OUTPATIENT
Start: 2021-01-11 | End: 2021-01-21

## 2021-01-11 RX ORDER — QUETIAPINE FUMARATE 200 MG/1
50 TABLET, FILM COATED ORAL EVERY 6 HOURS
Refills: 0 | Status: DISCONTINUED | OUTPATIENT
Start: 2021-01-11 | End: 2021-01-21

## 2021-01-11 RX ADMIN — QUETIAPINE FUMARATE 100 MILLIGRAM(S): 200 TABLET, FILM COATED ORAL at 20:20

## 2021-01-11 RX ADMIN — Medication 5 MILLIGRAM(S): at 02:11

## 2021-01-11 RX ADMIN — QUETIAPINE FUMARATE 100 MILLIGRAM(S): 200 TABLET, FILM COATED ORAL at 11:15

## 2021-01-11 RX ADMIN — Medication 5 MILLIGRAM(S): at 20:17

## 2021-01-11 NOTE — PROGRESS NOTE BEHAVIORAL HEALTH - SUMMARY
41 y/o woman, ?domiciled with friend, unemployed, PPH: schizoaffective disorder, polysubstance use (alcohol, crack cocaine), multiple prior hospitalizations, most recently discharged from [facility] on 12/31/20 after admission for SIMD, hx of Invega IM, active AOT/IMT, previous suicide attempts, with remote PMH of seizure disorder as per chart, BIB self to [facility] ED reporting sadness, similar presentation as prior admission. Her presentation appears consistent with decompensation of schizoaffective d/o in context of high suspicion of substance abuse (pending UDS, hx of cocaine use) and medication nonadherence (pt is overdue for Invega Sustenna NAGY).     #Schizoaffective d/o; R/O SIMD  -start seroquel 100 mg bid  -c/w seroquel 50 mg q6h prn for agitation  -c/w Invega Sustenna 234 mg IM q4 weeks (last dose given on 12/4/20, next dose overdue) --> NF request sent (1/11/21)  -start hydroxyzine 50 mg q6h prn for anxiety or/and insomnia    #?Polysubstance abuse (cocaine, etoh)  -pt denies withdrawal sx; no withdrawal sx noted  -monitor for s/sx of withdrawal  -encourage rehab 39 y/o woman, ?domiciled with friend, unemployed, PPH: schizoaffective disorder, polysubstance use (alcohol, crack cocaine), multiple prior hospitalizations, most recently discharged from [facility] on 12/31/20 after admission for SIMD, hx of Invega IM, active AOT/IMT, previous suicide attempts, with remote PMH of seizure disorder as per chart review, BIB self to [facility] ED reporting sadness, similar presentation as prior admission. Her presentation appears consistent with decompensation of schizoaffective d/o in context of high suspicion of substance abuse (pending UDS, hx of cocaine use) and medication nonadherence (pt is overdue for Invega Sustenna NAGY).     #Schizoaffective d/o; R/O SIMD  -start seroquel 100 mg bid  -c/w seroquel 50 mg q6h prn for agitation  -c/w Invega Sustenna 234 mg IM q4 weeks (last dose given on 12/4/20, next dose overdue) --> NF request sent (1/11/21)  -start hydroxyzine 50 mg q6h prn for anxiety or/and insomnia    #?Polysubstance abuse (cocaine, etoh)  -pt denies withdrawal sx; no withdrawal sx noted  -monitor for s/sx of withdrawal  -encourage rehab 41 y/o woman, ?domiciled with friend, unemployed, PPH: schizoaffective disorder, polysubstance use (alcohol, crack cocaine), multiple prior hospitalizations, most recently discharged from [facility] on 12/31/20 after admission for SIMD, hx of Invega IM, active AOT/IMT, previous suicide attempts, with remote PMH of seizure disorder as per chart review, BIB self to [facility] ED reporting sadness, similar presentation as prior admission. Her presentation appears consistent with decompensation of schizoaffective d/o in context of high suspicion of substance abuse (pending UDS, hx of cocaine use) and medication nonadherence (pt is overdue for Invega Sustenna NAGY). R/o secondary gain, as per , pt with hx of relapsing on substances and gaining hospital admission for shelter.    #Schizoaffective d/o; R/O SIMD  -start seroquel 100 mg bid  -c/w seroquel 50 mg q6h prn for agitation  -c/w Invega Sustenna 234 mg IM q4 weeks (last dose given on 12/4/20, next dose overdue) --> NF request sent (1/11/21)  -start hydroxyzine 50 mg q6h prn for anxiety or/and insomnia    #?Polysubstance abuse (cocaine, etoh)  -pt denies withdrawal sx; no withdrawal sx noted  -monitor for s/sx of withdrawal  -encourage rehab

## 2021-01-11 NOTE — PROGRESS NOTE BEHAVIORAL HEALTH - OTHER
intermittently loud minimally cooperative unable to assess limited interview, pt is minimally cooperative

## 2021-01-11 NOTE — CONSULT NOTE ADULT - ASSESSMENT
41 yo male with schizophrenia admitted for     # Suicidal ideation/ Schizophrenia  Continue care per primary   Monitor vitals  Re consult medicine as needed    [id number]
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
## 2021-01-11 NOTE — PROGRESS NOTE BEHAVIORAL HEALTH - NSBHADDHXSUBSTFT_PSY_A_CORE
Endorses cigarettes use, 4 PPD; denies alcohol use and cocaine use, denies other illicit substances. Of note, hx of cocaine abuse and of minimizing substance use, pending UDS.

## 2021-01-11 NOTE — PROGRESS NOTE BEHAVIORAL HEALTH - NSBHADMITMEDEDUDETAILS_A_CORE FT
Reeducated pt on the risks and benefits of seroquel, and side effects profile. Pt reports home medication, verbalizes understanding.

## 2021-01-11 NOTE — PROGRESS NOTE BEHAVIORAL HEALTH - NSBHADDHXPSYCHFT_PSY_A_CORE
As per chart review, patient has a history of multiple admissions for SI/SIMD ([facility], [facility], [facility]; most recent [facility] from 12/23/20-12/31/20), active AOT as per , IMT ([facility])

## 2021-01-11 NOTE — PROGRESS NOTE BEHAVIORAL HEALTH - NSBHADMITIPBHPROVFT_PSY_A_CORE
IMT, [place] [facility], Traci Conteh [facility] ([phone])  , Ms. Cole ([phone]) - pt has active AOT, has not been seen by [facility] since 2/2020 with Dr. Gandara IMT, [place] [facility], Traci Conteh [facility] ([phone])  , Ms. Cole ([phone]) - pt has active AOT, has not been seen by [facility] since 2/2020 with Dr. Gandara

## 2021-01-11 NOTE — CONSULT NOTE ADULT - SUBJECTIVE AND OBJECTIVE BOX
FIDENCIO GARSIA  40y, Female  Allergy: Haldol (Unknown)      OVERNIGHT EVENTS:  39 yo m with no medical condition, has psychiatry h/o schizophrenia admitted to [facility] for suicidal ideation. He denied chest pain, sob, palpitations and has no HTN, DM2, HLD.    REVIEW OF SYSTEMS:    CONSTITUTIONAL: No weakness, fevers or chills  EYES/ENT: No visual changes;  No vertigo or throat pain   NECK: No pain or stiffness  RESPIRATORY: No cough, wheezing, hemoptysis; No shortness of breath  CARDIOVASCULAR: No chest pain or palpitations  GASTROINTESTINAL: No abdominal or epigastric pain. No nausea, vomiting, or hematemesis; No diarrhea or constipation. No melena or hematochezia.  GENITOURINARY: No dysuria, frequency or hematuria  NEUROLOGICAL: No numbness or weakness  SKIN: No itching, rashes      PAST MEDICAL & SURGICAL HISTORY:  Schizophrenia        VITALS:  T(F): 98.5 (01-10-21 @ 15:45), Max: 98.5 (01-10-21 @ 15:45)  HR: 54 (01-10-21 @ 15:45)  BP: 93/57 (01-10-21 @ 15:45) (93/57 - 93/57)  RR: 16 (01-10-21 @ 15:45)  SpO2: --    General: WN/WD NAD  Neurology: A&Ox3, nonfocal, WALL x 4  Eyes: PERRLA/ EOMI, Gross vision intact  ENT/Neck: Neck supple, trachea midline, No JVD, Gross hearing intact  Respiratory: CTA B/L, No wheezing, rales, rhonchi  CV: RRR, S1S2, no murmurs, rubs or gallops  Abdominal: Soft, NT, ND +BS,   Extremities: No edema, + peripheral pulses  Skin: No Rashes, Hematoma, Ecchymosis      TESTS & MEASUREMENTS:  Height (cm): 175.3 (01-10-21 @ 06:57)  Weight (kg): 61.235 (01-10-21 @ 06:57)  BMI (kg/m2): 19.9 (01-10-21 @ 06:57)          Urinalysis Basic - ( 2021 14:02 )    Color: Yellow / Appearance: Clear / S.020 / pH: x  Gluc: x / Ketone: NEGATIVE  / Bili: Negative / Urobili: 2.0 E.U./dL   Blood: x / Protein: NEGATIVE mg/dL / Nitrite: NEGATIVE   Leuk Esterase: NEGATIVE / RBC: x / WBC x   Sq Epi: x / Non Sq Epi: x / Bacteria: x        RADIOLOGY & ADDITIONAL TESTS:    MEDICATIONS:  MEDICATIONS  (STANDING):  melatonin. 5 milliGRAM(s) Oral <User Schedule>  QUEtiapine 100 milliGRAM(s) Oral two times a day    MEDICATIONS  (PRN):  hydrOXYzine hydrochloride 50 milliGRAM(s) Oral every 6 hours PRN anxiety or/and insomnia  LORazepam     Tablet 1 milliGRAM(s) Oral every 12 hours PRN agitation  nicotine  Polacrilex Gum 2 milliGRAM(s) Oral every 2 hours PRN smoking cessation  QUEtiapine 50 milliGRAM(s) Oral every 6 hours PRN agitation      HEALTH ISSUES - PROBLEM Dx:  Major depressive disorder    Cocaine abuse    Suicidal ideation  Suicidal ideation            Case discussed in details with:
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
## 2021-01-12 LAB
SARS-COV-2 IGG SERPL QL IA: NEGATIVE — SIGNIFICANT CHANGE UP
SARS-COV-2 IGM SERPL IA-ACNC: 0.18 INDEX — SIGNIFICANT CHANGE UP

## 2021-01-12 PROCEDURE — 99231 SBSQ HOSP IP/OBS SF/LOW 25: CPT

## 2021-01-12 RX ORDER — PALIPERIDONE 1.5 MG/1
234 TABLET, EXTENDED RELEASE ORAL ONCE
Refills: 0 | Status: DISCONTINUED | OUTPATIENT
Start: 2021-01-12 | End: 2021-01-12

## 2021-01-12 RX ORDER — QUETIAPINE FUMARATE 200 MG/1
100 TABLET, FILM COATED ORAL DAILY
Refills: 0 | Status: DISCONTINUED | OUTPATIENT
Start: 2021-01-13 | End: 2021-01-21

## 2021-01-12 RX ORDER — PALIPERIDONE 1.5 MG/1
234 TABLET, EXTENDED RELEASE ORAL ONCE
Refills: 0 | Status: DISCONTINUED | OUTPATIENT
Start: 2021-01-13 | End: 2021-01-13

## 2021-01-12 RX ORDER — QUETIAPINE FUMARATE 200 MG/1
200 TABLET, FILM COATED ORAL AT BEDTIME
Refills: 0 | Status: DISCONTINUED | OUTPATIENT
Start: 2021-01-12 | End: 2021-01-21

## 2021-01-12 RX ADMIN — Medication 1 MILLIGRAM(S): at 20:25

## 2021-01-12 RX ADMIN — QUETIAPINE FUMARATE 100 MILLIGRAM(S): 200 TABLET, FILM COATED ORAL at 09:39

## 2021-01-12 RX ADMIN — Medication 5 MILLIGRAM(S): at 20:21

## 2021-01-12 RX ADMIN — QUETIAPINE FUMARATE 200 MILLIGRAM(S): 200 TABLET, FILM COATED ORAL at 20:22

## 2021-01-12 NOTE — PROGRESS NOTE BEHAVIORAL HEALTH - NSBHADMITIPBHPROVFT_PSY_A_CORE
IMT, [place] [facility], Traci Conteh [facility] ([phone])  , Ms. Cole ([phone]) - pt has active AOT, has not been seen by [facility] since 2/2020 with Dr. Gandara

## 2021-01-12 NOTE — PROGRESS NOTE BEHAVIORAL HEALTH - SUMMARY
41 y/o woman, ?domiciled with friend, unemployed, PPH: schizoaffective disorder, polysubstance use (alcohol, crack cocaine), multiple prior hospitalizations, most recently discharged from [facility] on 12/31/20 after admission for SIMD, hx of Invega IM, active AOT/IMT, previous suicide attempts, with remote PMH of seizure disorder as per chart review, BIB self to [facility] ED reporting sadness, similar presentation as prior admission. Her presentation appears consistent with decompensation of schizoaffective d/o in context of high substance abuse (+UDS, cocaine) and medication nonadherence (pt is overdue for Invega Sustenna NAGY). R/o secondary gain, as per , pt with hx of relapsing on substances and gaining hospital admission for shelter.    #Schizoaffective d/o; R/O SIMD  -start seroquel 100 mg bid --> titrate seroquel 100 mg daily, seroquel 200 mg qhs  -c/w seroquel 50 mg q6h prn for agitation  -c/w Invega Sustenna 234 mg IM q4 weeks (last dose given on 12/4/20, next dose overdue) --> NF request sent (1/11/21)  -start hydroxyzine 50 mg q6h prn for anxiety or/and insomnia    #Cocaine use disorder  -no treatment protocol indicated at this time  -encourage rehab
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
## 2021-01-13 PROCEDURE — 99231 SBSQ HOSP IP/OBS SF/LOW 25: CPT

## 2021-01-13 RX ORDER — PALIPERIDONE 1.5 MG/1
234 TABLET, EXTENDED RELEASE ORAL ONCE
Refills: 0 | Status: COMPLETED | OUTPATIENT
Start: 2021-01-14 | End: 2021-01-14

## 2021-01-13 RX ADMIN — Medication 1 MILLIGRAM(S): at 20:18

## 2021-01-13 RX ADMIN — QUETIAPINE FUMARATE 200 MILLIGRAM(S): 200 TABLET, FILM COATED ORAL at 20:14

## 2021-01-13 RX ADMIN — QUETIAPINE FUMARATE 100 MILLIGRAM(S): 200 TABLET, FILM COATED ORAL at 07:59

## 2021-01-13 RX ADMIN — Medication 5 MILLIGRAM(S): at 20:14

## 2021-01-13 NOTE — PROGRESS NOTE BEHAVIORAL HEALTH - NSBHADMITIPBHPROVFT_PSY_A_CORE
IMT, [place] [facility], Traci Conteh [facility] ([phone])  , Ms. Cole ([phone]) - pt has active AOT, has not been seen by [facility] since 2/2020 with Dr. Gandara

## 2021-01-13 NOTE — PROGRESS NOTE BEHAVIORAL HEALTH - NSBHFUPINTERVALHXFT_PSY_A_CORE
Pt seen and evaluated, chart reviewed. As per nursing report, no acute events overnight, pt continues to refuse labs/EKG. On evaluation, pt continues to present oddly-related and irritable, minimally cooperative. She reports mood "sad". She reports poor sleep; as per staff, pt observed sleeping last night. Pt with improved appetite. She denies AVH, paranoia, delusions. She reports continued passive SI d/t continued sadness, denies intent and plan. Denies HI, intent and plan. Pt is medication adherent, denies negative side effects. Pending Invega Sustenna NAGY procurement from pharmacy.   Pt not experiencing any Covid-19 related sx.

## 2021-01-13 NOTE — PROGRESS NOTE BEHAVIORAL HEALTH - SUMMARY
41 y/o woman, ?domiciled with friend, unemployed, PPH: schizoaffective disorder, polysubstance use (alcohol, crack cocaine), multiple prior hospitalizations, most recently discharged from [facility] on 12/31/20 after admission for SIMD, hx of Invega IM, active AOT/IMT, previous suicide attempts, with remote PMH of seizure disorder as per chart review, BIB self to [facility] ED reporting sadness, similar presentation as prior admission. Her presentation appears consistent with decompensation of schizoaffective d/o in context of high substance abuse (+UDS, cocaine) and medication nonadherence (pt is overdue for Invega Sustenna NAGY). R/o secondary gain, as per , pt with hx of relapsing on substances and gaining hospital admission for shelter.    #Schizoaffective d/o; R/O SIMD  -start seroquel 100 mg bid --> titrate seroquel 100 mg daily, seroquel 200 mg qhs  -c/w seroquel 50 mg q6h prn for agitation  -c/w Invega Sustenna 234 mg IM q4 weeks (last dose given on 12/4/20, next dose overdue) --> NF request sent (1/11/21)  -start hydroxyzine 50 mg q6h prn for anxiety or/and insomnia    #Cocaine use disorder  -no treatment protocol indicated at this time  -encourage rehab

## 2021-01-13 NOTE — CHART NOTE - NSCHARTNOTEFT_GEN_A_CORE
Social Work Note:    Jeremy remains on the unit for continued treatment, safety, and observation.  The treatment team met with patient to review treatment plan, medication and discharge plan.  Patient is minimally cooperative with interview and responds to questions with very few words.  She continues to endorse “sad” mood with passive suicidal ideations.  She also reports poor sleep.  Patient denies homicidal ideations and A/V/H.  Jeremy has been mostly isolative to her room and in bed.        In the community, patient has an active AOT and an Intensive Mobile Treatment (IMT) team.  Patient is domiciled in an apartment with her mother    At this time, patient is not yet psychiatrically stable for discharge.        Mental Status Exam:    Mood- Depressed    Sleep- Fair    Appetite- Good    ADL's- Fair    Thought Process- Linear    Observation-  Routine q10.

## 2021-01-14 LAB — SARS-COV-2 RNA SPEC QL NAA+PROBE: SIGNIFICANT CHANGE UP

## 2021-01-14 PROCEDURE — 99231 SBSQ HOSP IP/OBS SF/LOW 25: CPT

## 2021-01-14 RX ADMIN — QUETIAPINE FUMARATE 200 MILLIGRAM(S): 200 TABLET, FILM COATED ORAL at 20:17

## 2021-01-14 RX ADMIN — PALIPERIDONE 234 MILLIGRAM(S): 1.5 TABLET, EXTENDED RELEASE ORAL at 15:48

## 2021-01-14 RX ADMIN — Medication 1 MILLIGRAM(S): at 20:17

## 2021-01-14 RX ADMIN — QUETIAPINE FUMARATE 100 MILLIGRAM(S): 200 TABLET, FILM COATED ORAL at 08:01

## 2021-01-14 RX ADMIN — Medication 5 MILLIGRAM(S): at 20:17
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
## 2021-01-14 NOTE — PROGRESS NOTE BEHAVIORAL HEALTH - SUMMARY
39 y/o woman, ?domiciled with friend, unemployed, PPH: schizoaffective disorder, polysubstance use (alcohol, crack cocaine), multiple prior hospitalizations, most recently discharged from [facility] on 12/31/20 after admission for SIMD, hx of Invega IM, active AOT/IMT, previous suicide attempts, with remote PMH of seizure disorder as per chart review, BIB self to [facility] ED reporting sadness, similar presentation as prior admission. Her presentation appears consistent with decompensation of schizoaffective d/o in context of high substance abuse (+UDS, cocaine) and medication nonadherence (pt is overdue for Invega Sustenna NAGY). R/o secondary gain, as per , pt with hx of relapsing on substances and gaining hospital admission for shelter.    #Schizoaffective d/o; R/O SIMD  -start seroquel 100 mg bid --> titrate seroquel 100 mg daily, seroquel 200 mg qhs  -c/w seroquel 50 mg q6h prn for agitation  -c/w Invega Sustenna 234 mg IM q4 weeks (last dose given on 12/4/20, next dose overdue) --> NF request sent (1/11/21)  -start hydroxyzine 50 mg q6h prn for anxiety or/and insomnia    #Cocaine use disorder  -no treatment protocol indicated at this time  -encourage rehab

## 2021-01-14 NOTE — PROGRESS NOTE BEHAVIORAL HEALTH - NSBHADMITIPBHPROVFT_PSY_A_CORE
IMT, [place] [facility], Traci Conteh [facility] ([phone])  , Ms. Cole ([phone]) - pt has active AOT, has not [facility] since 2/2020 with Dr. Gandara

## 2021-01-15 PROCEDURE — 99231 SBSQ HOSP IP/OBS SF/LOW 25: CPT

## 2021-01-15 RX ORDER — SENNA PLUS 8.6 MG/1
2 TABLET ORAL AT BEDTIME
Refills: 0 | Status: DISCONTINUED | OUTPATIENT
Start: 2021-01-15 | End: 2021-01-19

## 2021-01-15 RX ORDER — POLYETHYLENE GLYCOL 3350 17 G/17G
17 POWDER, FOR SOLUTION ORAL ONCE
Refills: 0 | Status: COMPLETED | OUTPATIENT
Start: 2021-01-15 | End: 2021-01-15

## 2021-01-15 RX ADMIN — QUETIAPINE FUMARATE 200 MILLIGRAM(S): 200 TABLET, FILM COATED ORAL at 20:21

## 2021-01-15 RX ADMIN — SENNA PLUS 2 TABLET(S): 8.6 TABLET ORAL at 20:21

## 2021-01-15 RX ADMIN — POLYETHYLENE GLYCOL 3350 17 GRAM(S): 17 POWDER, FOR SOLUTION ORAL at 11:44

## 2021-01-15 RX ADMIN — Medication 5 MILLIGRAM(S): at 20:21

## 2021-01-15 RX ADMIN — QUETIAPINE FUMARATE 100 MILLIGRAM(S): 200 TABLET, FILM COATED ORAL at 08:34

## 2021-01-15 RX ADMIN — Medication 50 MILLIGRAM(S): at 21:23

## 2021-01-15 RX ADMIN — Medication 2 MILLIGRAM(S): at 17:19

## 2021-01-15 NOTE — PROGRESS NOTE BEHAVIORAL HEALTH - NSBHADMITIPBHPROVFT_PSY_A_CORE
IMT, [place] [facility], Traci Conteh [facility] ([phone])  , Ms. Cole ([phone]) - pt has active AOT, has not been seen by [facility] since 2/2020 with Dr. Gandara

## 2021-01-15 NOTE — PROGRESS NOTE BEHAVIORAL HEALTH - SUMMARY
39 y/o woman, ?domiciled with friend, unemployed, PPH: schizoaffective disorder, polysubstance use (alcohol, crack cocaine), multiple prior hospitalizations, most recently discharged from [facility] on 12/31/20 after admission for SIMD, hx of Invega IM, active AOT/IMT, previous suicide attempts, with remote PMH of seizure disorder as per chart review, BIB self to [facility] ED reporting sadness, similar presentation as prior admission. Her presentation appears consistent with decompensation of schizoaffective d/o in context of high substance abuse (+UDS, cocaine) and medication nonadherence (pt is overdue for Invega Sustenna NAGY). R/o secondary gain, as per , pt with hx of relapsing on substances and gaining hospital admission for shelter.    #Schizoaffective d/o; R/O SIMD  -start seroquel 100 mg bid --> titrate seroquel 100 mg daily, seroquel 200 mg qhs  -c/w seroquel 50 mg q6h prn for agitation  -c/w Invega Sustenna 234 mg IM q4 weeks --> given on 1/14/2021  -start hydroxyzine 50 mg q6h prn for anxiety or/and insomnia    #Cocaine use disorder  -no treatment protocol indicated at this time  -encourage rehab
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
## 2021-01-15 NOTE — PROGRESS NOTE BEHAVIORAL HEALTH - NSBHFUPSUICINTERVALFT_PSY_A_CORE
Pt endorses SI, states she will OD on pills if she were not in the hospital, denies intent while hospitalized
Pt endorses SI, states she will OD on pills if she were not in the hospital, denies intent while hospitalized

## 2021-01-16 LAB
A1C WITH ESTIMATED AVERAGE GLUCOSE RESULT: 5.4 % — SIGNIFICANT CHANGE UP (ref 4–5.6)
ALBUMIN SERPL ELPH-MCNC: 4 G/DL — SIGNIFICANT CHANGE UP (ref 3.5–5.2)
ALP SERPL-CCNC: 72 U/L — SIGNIFICANT CHANGE UP (ref 30–115)
ALT FLD-CCNC: 7 U/L — SIGNIFICANT CHANGE UP (ref 0–41)
AST SERPL-CCNC: 12 U/L — SIGNIFICANT CHANGE UP (ref 0–41)
BASOPHILS # BLD AUTO: 0.01 K/UL — SIGNIFICANT CHANGE UP (ref 0–0.2)
BASOPHILS NFR BLD AUTO: 0.2 % — SIGNIFICANT CHANGE UP (ref 0–1)
BILIRUB DIRECT SERPL-MCNC: <0.2 MG/DL — SIGNIFICANT CHANGE UP (ref 0–0.2)
BILIRUB INDIRECT FLD-MCNC: 0 MG/DL — SIGNIFICANT CHANGE UP (ref 0.2–1.2)
BILIRUB SERPL-MCNC: <0.2 MG/DL — SIGNIFICANT CHANGE UP (ref 0.2–1.2)
CHOLEST SERPL-MCNC: 195 MG/DL — SIGNIFICANT CHANGE UP
EOSINOPHIL # BLD AUTO: 0.19 K/UL — SIGNIFICANT CHANGE UP (ref 0–0.7)
EOSINOPHIL NFR BLD AUTO: 4.6 % — SIGNIFICANT CHANGE UP (ref 0–8)
ESTIMATED AVERAGE GLUCOSE: 108 MG/DL — SIGNIFICANT CHANGE UP (ref 68–114)
HCT VFR BLD CALC: 36 % — LOW (ref 37–47)
HDLC SERPL-MCNC: 75 MG/DL — SIGNIFICANT CHANGE UP
HGB BLD-MCNC: 11.5 G/DL — LOW (ref 12–16)
IMM GRANULOCYTES NFR BLD AUTO: 0 % — LOW (ref 0.1–0.3)
LIPID PNL WITH DIRECT LDL SERPL: 120 MG/DL — HIGH
LYMPHOCYTES # BLD AUTO: 2.52 K/UL — SIGNIFICANT CHANGE UP (ref 1.2–3.4)
LYMPHOCYTES # BLD AUTO: 61.3 % — HIGH (ref 20.5–51.1)
MCHC RBC-ENTMCNC: 28.5 PG — SIGNIFICANT CHANGE UP (ref 27–31)
MCHC RBC-ENTMCNC: 31.9 G/DL — LOW (ref 32–37)
MCV RBC AUTO: 89.1 FL — SIGNIFICANT CHANGE UP (ref 81–99)
MONOCYTES # BLD AUTO: 0.4 K/UL — SIGNIFICANT CHANGE UP (ref 0.1–0.6)
MONOCYTES NFR BLD AUTO: 9.7 % — HIGH (ref 1.7–9.3)
NEUTROPHILS # BLD AUTO: 0.99 K/UL — LOW (ref 1.4–6.5)
NEUTROPHILS NFR BLD AUTO: 24.2 % — LOW (ref 42.2–75.2)
NEUTS BAND # BLD: 1 % — SIGNIFICANT CHANGE UP (ref 0–6)
NON HDL CHOLESTEROL: 120 MG/DL — SIGNIFICANT CHANGE UP
NRBC # BLD: 0 /100 WBCS — SIGNIFICANT CHANGE UP (ref 0–0)
NRBC # BLD: 0 /100 — SIGNIFICANT CHANGE UP (ref 0–0)
PLAT MORPH BLD: NORMAL — SIGNIFICANT CHANGE UP
PLATELET # BLD AUTO: 269 K/UL — SIGNIFICANT CHANGE UP (ref 130–400)
PROT SERPL-MCNC: 6.7 G/DL — SIGNIFICANT CHANGE UP (ref 6–8)
RBC # BLD: 4.04 M/UL — LOW (ref 4.2–5.4)
RBC # FLD: 15.1 % — HIGH (ref 11.5–14.5)
RBC BLD AUTO: NORMAL — SIGNIFICANT CHANGE UP
TRIGL SERPL-MCNC: 78 MG/DL — SIGNIFICANT CHANGE UP
VARIANT LYMPHS # BLD: 1 % — SIGNIFICANT CHANGE UP (ref 0–5)
WBC # BLD: 4.11 K/UL — LOW (ref 4.8–10.8)
WBC # FLD AUTO: 4.11 K/UL — LOW (ref 4.8–10.8)

## 2021-01-16 RX ADMIN — QUETIAPINE FUMARATE 100 MILLIGRAM(S): 200 TABLET, FILM COATED ORAL at 08:06

## 2021-01-16 RX ADMIN — QUETIAPINE FUMARATE 200 MILLIGRAM(S): 200 TABLET, FILM COATED ORAL at 20:01

## 2021-01-16 RX ADMIN — SENNA PLUS 2 TABLET(S): 8.6 TABLET ORAL at 20:01

## 2021-01-16 RX ADMIN — Medication 50 MILLIGRAM(S): at 20:28

## 2021-01-16 RX ADMIN — Medication 5 MILLIGRAM(S): at 20:01

## 2021-01-16 RX ADMIN — Medication 2 MILLIGRAM(S): at 19:15

## 2021-01-17 PROCEDURE — 99231 SBSQ HOSP IP/OBS SF/LOW 25: CPT

## 2021-01-17 RX ADMIN — Medication 2 MILLIGRAM(S): at 19:32

## 2021-01-17 RX ADMIN — QUETIAPINE FUMARATE 200 MILLIGRAM(S): 200 TABLET, FILM COATED ORAL at 20:01

## 2021-01-17 RX ADMIN — Medication 2 MILLIGRAM(S): at 12:39

## 2021-01-17 RX ADMIN — Medication 50 MILLIGRAM(S): at 19:32

## 2021-01-17 RX ADMIN — Medication 5 MILLIGRAM(S): at 20:01

## 2021-01-17 RX ADMIN — QUETIAPINE FUMARATE 100 MILLIGRAM(S): 200 TABLET, FILM COATED ORAL at 08:05

## 2021-01-17 RX ADMIN — Medication 1 MILLIGRAM(S): at 22:23

## 2021-01-18 LAB — SARS-COV-2 RNA SPEC QL NAA+PROBE: SIGNIFICANT CHANGE UP

## 2021-01-18 RX ADMIN — QUETIAPINE FUMARATE 200 MILLIGRAM(S): 200 TABLET, FILM COATED ORAL at 20:28

## 2021-01-18 RX ADMIN — Medication 50 MILLIGRAM(S): at 17:46

## 2021-01-18 RX ADMIN — Medication 5 MILLIGRAM(S): at 20:28

## 2021-01-18 RX ADMIN — QUETIAPINE FUMARATE 100 MILLIGRAM(S): 200 TABLET, FILM COATED ORAL at 08:15

## 2021-01-19 PROCEDURE — 99231 SBSQ HOSP IP/OBS SF/LOW 25: CPT

## 2021-01-19 RX ADMIN — QUETIAPINE FUMARATE 100 MILLIGRAM(S): 200 TABLET, FILM COATED ORAL at 08:17

## 2021-01-19 RX ADMIN — Medication 5 MILLIGRAM(S): at 20:03

## 2021-01-19 RX ADMIN — Medication 1 MILLIGRAM(S): at 22:48

## 2021-01-19 RX ADMIN — QUETIAPINE FUMARATE 50 MILLIGRAM(S): 200 TABLET, FILM COATED ORAL at 22:48

## 2021-01-19 RX ADMIN — QUETIAPINE FUMARATE 200 MILLIGRAM(S): 200 TABLET, FILM COATED ORAL at 20:03

## 2021-01-19 NOTE — PROGRESS NOTE BEHAVIORAL HEALTH - NSBHFUPINTERVALHXFT_PSY_A_CORE
Pt seen and evaluated, chart reviewed. As per nursing report, pt had no acute events overnight, PRN hydroxyzine for anxiety. On evaluation, pt continues to present oddly-related, preservative on discharge. She reports mood "better", states "I'm ready to leave". She endorses good sleep and appetite, regular BMs. She denies AVH, paranoia, delusions; pt does not appear internally preoccupied. She denies SI/HI, intent and plan. Pt continues to deny recent cocaine use despite +UDS (cocaine), refuses rehab despite encouragement, limited insight. Pt is medication adherent, denies negative side effects.   Pt not experiencing any Covid-19 related sx.

## 2021-01-19 NOTE — PROGRESS NOTE BEHAVIORAL HEALTH - NSBHADMITIPBHPROVFT_PSY_A_CORE
IMT, [place] [facility], Traci Conteh [facility] ([phone])  , Ms. Cole ([phone]) - pt has active AOT, has not been [facility] since 2/2020 with Dr. Gandara

## 2021-01-19 NOTE — PATIENT PROFILE BEHAVIORAL HEALTH - FUNCTIONAL SCREEN CURRENT LEVEL: BATHING, MLM
[FreeTextEntry6] : pt here for covid pcr only was exposed to someone in class 5 days ago\par pt is without any sx, denies f, body aches URI, GI , anosmia
0 = independent

## 2021-01-19 NOTE — PROGRESS NOTE BEHAVIORAL HEALTH - SUMMARY
39 y/o woman, ?domiciled with friend, unemployed, PPH: schizoaffective disorder, polysubstance use (alcohol, crack cocaine), multiple prior hospitalizations, most recently discharged from [facility] on 12/31/20 after admission for SIMD, hx of Invega IM, active AOT/IMT, previous suicide attempts, with remote PMH of seizure disorder as per chart review, BIB self to [facility] ED reporting sadness, similar presentation as prior admission. Her presentation appears consistent with decompensation of schizoaffective d/o in context of high substance abuse (+UDS, cocaine) and medication nonadherence (pt is overdue for Invega Sustenna NAGY). R/o secondary gain, as per , pt with hx of relapsing on substances and gaining hospital admission for shelter.    #Schizoaffective d/o; R/O SIMD  -start seroquel 100 mg bid --> titrate seroquel 100 mg daily, seroquel 200 mg qhs  -c/w seroquel 50 mg q6h prn for agitation  -c/w Invega Sustenna 234 mg IM q4 weeks --> given on 1/14/2021  -start hydroxyzine 50 mg q6h prn for anxiety or/and insomnia    #Cocaine use disorder  -no treatment protocol indicated at this time  -encourage rehab

## 2021-01-20 VITALS
TEMPERATURE: 97 F | HEART RATE: 58 BPM | RESPIRATION RATE: 16 BRPM | SYSTOLIC BLOOD PRESSURE: 104 MMHG | DIASTOLIC BLOOD PRESSURE: 52 MMHG

## 2021-01-20 LAB
BENZOYLEGONINE, UR RESULT: 1687 NG/ML — SIGNIFICANT CHANGE UP
BZE UR QL SCN: 1687 NG/ML — SIGNIFICANT CHANGE UP
COCAINE IN-HOUSE INTERPRETATION: POSITIVE
COCAINE UR QL SCN: POSITIVE

## 2021-01-20 PROCEDURE — 99231 SBSQ HOSP IP/OBS SF/LOW 25: CPT

## 2021-01-20 RX ADMIN — QUETIAPINE FUMARATE 200 MILLIGRAM(S): 200 TABLET, FILM COATED ORAL at 20:07

## 2021-01-20 RX ADMIN — Medication 5 MILLIGRAM(S): at 20:07

## 2021-01-20 RX ADMIN — QUETIAPINE FUMARATE 100 MILLIGRAM(S): 200 TABLET, FILM COATED ORAL at 08:32

## 2021-01-20 RX ADMIN — Medication 50 MILLIGRAM(S): at 21:22

## 2021-01-20 NOTE — PROGRESS NOTE BEHAVIORAL HEALTH - NSBHFUPINTERVALHXFT_PSY_A_CORE
Pt seen and evaluated, chart reviewed. As per nursing report, pt became anxious last night in context of discharge, PRN hydroxyzine and ativan given. On evaluation, pt continues to present oddly-related and preservative on discharge, stating she signed into the hospital on voluntary status and can leave when she wants to. She reports good mood, sleep and appetite. She denies AVH, paranoia, delusions. She denies SI/HI, intent and plan. Pt continues to deny recent cocaine use despite +UDS (cocaine), refuses rehab despite encouragement, limited insight. Pt is medication adherent, denies negative side effects. Discharge planning started, collaboration with pt's IMT team.  Pt not experiencing any Covid-19 related sx.

## 2021-01-20 NOTE — PROGRESS NOTE BEHAVIORAL HEALTH - NSBHCHARTREVIEWINVESTIGATE_PSY_A_CORE FT
< from: 12 Lead ECG (01.09.21 @ 10:40) >      Ventricular Rate 51 BPM    Atrial Rate 51 BPM    P-R Interval 154 ms    QRS Duration 80 ms    Q-T Interval 432 ms    QTC Calculation(Bazett) 398 ms    P Axis -27 degrees    R Axis 78 degrees    T Axis 34 degrees    Diagnosis Line Sinus bradycardia    < end of copied text >

## 2021-01-20 NOTE — DISCHARGE NOTE BEHAVIORAL HEALTH - NSBHDCTHERAPYFT_PSY_A_CORE
Patient was provided with milieu therapy as well as daily supportive psychotherapy. Patient has been actively engaged in treatment, attending several groups. Patient has attended group related to illness management and recovery as well as DBT crisis skill group. Reinforced positive coping skills learned on the unit, STOP skill.

## 2021-01-20 NOTE — PROGRESS NOTE BEHAVIORAL HEALTH - NSBHADMITIPBHPROVFT_PSY_A_CORE
IMT, [place] [facility], Traci Conteh [facility] ([phone])  , Ms. Cole ([phone]) - pt has active AOT, has not been seen by [facility] since 2/2020 with Dr. Gandara

## 2021-01-20 NOTE — PROGRESS NOTE BEHAVIORAL HEALTH - NSBHADMITIPOBSFT_PSY_A_CORE
As per unit protocol
safety
As per unit protocol

## 2021-01-20 NOTE — PROGRESS NOTE BEHAVIORAL HEALTH - GAIT / STATION
Normal gait / station
Normal gait / station
Other
Normal gait / station
Other
Normal gait / station

## 2021-01-20 NOTE — PROGRESS NOTE BEHAVIORAL HEALTH - AFFECT QUALITY
Irritable
Irritable/Euthymic
Depressed
Irritable
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
## 2021-01-20 NOTE — DISCHARGE NOTE BEHAVIORAL HEALTH - NSBHDCSUBSTHXFT_PSY_A_CORE
Endorses cigarettes use, 4 PPD; denies alcohol use and cocaine use, denies other illicit substances. Of note, hx of cocaine abuse and of minimizing substance use. UDS +cocaine.

## 2021-01-20 NOTE — DISCHARGE NOTE BEHAVIORAL HEALTH - NSBHDCRESPONSEFT_PSY_A_CORE
Pt has made significant progress over the course of hospitalization. With continuous psychotherapy from the treatment team and the medications, patient reports feeling better, sleeping and eating well. Thought process improved, however pt continues with limited insight in regards to substance abuse and negative effects on mental health. Pt was calm and cooperative and was in good behavioral control. Patient denied any suicidal or homicidal ideations. Patient denied any auditory or visual hallucinations. Pt was evaluated by treatment team, pt is stable for discharge and patient shows no imminent danger to self, others or property at this time. Pt understands and agrees with treatment plan and following up with outpatient. Psychoeducation provided regarding diagnosis, medications, treatment and follow up. Risks, benefits, alternatives discussed, all questions and concerns addressed and answered.

## 2021-01-20 NOTE — PROGRESS NOTE BEHAVIORAL HEALTH - BODY HABITUS
Malnourished
Average build
Malnourished
Average build
Malnourished

## 2021-01-20 NOTE — PROGRESS NOTE BEHAVIORAL HEALTH - MUSCLE TONE / STRENGTH
Other
Normal muscle tone/strength
Other

## 2021-01-20 NOTE — DISCHARGE NOTE BEHAVIORAL HEALTH - NSBHPSYCHMEDOTHERFT_PSY_A_CORE
Pt insistent on staying on seroquel for sleep, refuses to taper off. Pt agreed to Invega Sustenna NAGY for medication adherence.

## 2021-01-20 NOTE — PROGRESS NOTE BEHAVIORAL HEALTH - NSBHCHARTREVIEWLAB_PSY_A_CORE FT
Cocaine Metabolite, Urine (01.11.21 @ 13:32) Cocaine Metabolite, Urine: Positive  Complete Blood Count + Automated Diff in AM (01.16.21 @ 07:55)   WBC Count: 4.11 K/uL   RBC Count: 4.04 M/uL   Hemoglobin: 11.5 g/dL   Hematocrit: 36.0 %   Mean Cell Volume: 89.1 fL   Mean Cell Hemoglobin: 28.5 pg   Mean Cell Hemoglobin Conc: 31.9 g/dL   Red Cell Distrib Width: 15.1 %   Platelet Count - Automated: 269 K/uL   Auto Neutrophil #: 0.99 K/uL   Auto Lymphocyte #: 2.52 K/uL   Auto Monocyte #: 0.40 K/uL   Auto Eosinophil #: 0.19 K/uL   Auto Basophil #: 0.01 K/uL   Auto Neutrophil %: 24.2: Differential percentages must be correlated with absolute numbers for   clinical significance. %   Auto Lymphocyte %: 61.3 %   Auto Monocyte %: 9.7 %   Auto Eosinophil %: 4.6 %   Auto Basophil %: 0.2 %   Auto Immature Granulocyte %: 0.0 %   Nucleated RBC: 0 /100 WBCs   Hepatic Function Panel in AM (01.16.21 @ 07:55)   Indirect Reacting Bilirubin: 0 mg/dL   Protein Total, Serum: 6.7 g/dL   Albumin, Serum: 4.0 g/dL   Bilirubin Total, Serum: <0.2 mg/dL   Bilirubin Direct, Serum: <0.2 mg/dL   Alkaline Phosphatase, Serum: 72 U/L   Aspartate Aminotransferase (AST/SGOT): 12 U/L   Alanine Aminotransferase (ALT/SGPT): 7 U/L   A1C with Estimated Average Glucose in AM (01.16.21 @ 07:55)   A1C with Estimated Average Glucose Result: 5.4  Lipid Profile in AM (01.16.21 @ 07:55)   Cholesterol, Serum: 195 mg/dL   Triglycerides, Serum: 78 mg/dL   HDL Cholesterol, Serum: 75 mg/dL   Non HDL Cholesterol: 120  LDL Cholesterol Calculated: 120 mg/dL
Cocaine Metabolite, Urine (01.11.21 @ 13:32) Cocaine Metabolite, Urine: Positive  Complete Blood Count + Automated Diff in AM (01.16.21 @ 07:55)   WBC Count: 4.11 K/uL   RBC Count: 4.04 M/uL   Hemoglobin: 11.5 g/dL   Hematocrit: 36.0 %   Mean Cell Volume: 89.1 fL   Mean Cell Hemoglobin: 28.5 pg   Mean Cell Hemoglobin Conc: 31.9 g/dL   Red Cell Distrib Width: 15.1 %   Platelet Count - Automated: 269 K/uL   Auto Neutrophil #: 0.99 K/uL   Auto Lymphocyte #: 2.52 K/uL   Auto Monocyte #: 0.40 K/uL   Auto Eosinophil #: 0.19 K/uL   Auto Basophil #: 0.01 K/uL   Auto Neutrophil %: 24.2: Differential percentages must be correlated with absolute numbers for   clinical significance. %   Auto Lymphocyte %: 61.3 %   Auto Monocyte %: 9.7 %   Auto Eosinophil %: 4.6 %   Auto Basophil %: 0.2 %   Auto Immature Granulocyte %: 0.0 %   Nucleated RBC: 0 /100 WBCs   Hepatic Function Panel in AM (01.16.21 @ 07:55)   Indirect Reacting Bilirubin: 0 mg/dL   Protein Total, Serum: 6.7 g/dL   Albumin, Serum: 4.0 g/dL   Bilirubin Total, Serum: <0.2 mg/dL   Bilirubin Direct, Serum: <0.2 mg/dL   Alkaline Phosphatase, Serum: 72 U/L   Aspartate Aminotransferase (AST/SGOT): 12 U/L   Alanine Aminotransferase (ALT/SGPT): 7 U/L   A1C with Estimated Average Glucose in AM (01.16.21 @ 07:55)   A1C with Estimated Average Glucose Result: 5.4  Lipid Profile in AM (01.16.21 @ 07:55)   Cholesterol, Serum: 195 mg/dL   Triglycerides, Serum: 78 mg/dL   HDL Cholesterol, Serum: 75 mg/dL   Non HDL Cholesterol: 120  LDL Cholesterol Calculated: 120 mg/dL
Complete Blood Count + Automated Diff (01.09.21 @ 10:54)   WBC Count: 3.68 K/uL   RBC Count: 3.84 M/uL   Hemoglobin: 11.1 g/dL   Hematocrit: 35.0 %   Mean Cell Volume: 91.1 fl   Mean Cell Hemoglobin: 28.9 pg   Mean Cell Hemoglobin Conc: 31.7 gm/dL   Red Cell Distrib Width: 15.2 %   Platelet Count - Automated: 255 K/uL   Auto Neutrophil #: 1.43 K/uL   Auto Lymphocyte #: 1.55 K/uL   Auto Monocyte #: 0.43 K/uL   Auto Eosinophil #: 0.24 K/uL   Auto Basophil #: 0.02 K/uL   Auto Neutrophil %: 38.9:  Auto Lymphocyte %: 42.1 %   Auto Monocyte %: 11.7 %   Auto Eosinophil %: 6.5 %   Auto Basophil %: 0.5 %   Auto Immature Granulocyte %: 0.3 %   Nucleated RBC: 0 /100 WBCs   Basic Metabolic Panel (01.09.21 @ 10:54)   Sodium, Serum: 140 mmol/L   Potassium, Serum: 4.2 mmol/L   Chloride, Serum: 106 mmol/L   Carbon Dioxide, Serum: 23 mmol/L   Anion Gap, Serum: 11 mmol/L   Blood Urea Nitrogen, Serum: 15 mg/dL   Creatinine, Serum: 1.11 mg/dL   Glucose, Serum: 78 mg/dL   Calcium, Total Serum: 9.0 mg/dL   eGFR if Non : 61  eGFR if African American: 71  Cocaine Metabolite, Urine (01.11.21 @ 13:32)   Cocaine Metabolite, Urine: Positive
Complete Blood Count + Automated Diff (01.09.21 @ 10:54)   WBC Count: 3.68 K/uL   RBC Count: 3.84 M/uL   Hemoglobin: 11.1 g/dL   Hematocrit: 35.0 %   Mean Cell Volume: 91.1 fl   Mean Cell Hemoglobin: 28.9 pg   Mean Cell Hemoglobin Conc: 31.7 gm/dL   Red Cell Distrib Width: 15.2 %   Platelet Count - Automated: 255 K/uL   Auto Neutrophil #: 1.43 K/uL   Auto Lymphocyte #: 1.55 K/uL   Auto Monocyte #: 0.43 K/uL   Auto Eosinophil #: 0.24 K/uL   Auto Basophil #: 0.02 K/uL   Auto Neutrophil %: 38.9:  Auto Lymphocyte %: 42.1 %   Auto Monocyte %: 11.7 %   Auto Eosinophil %: 6.5 %   Auto Basophil %: 0.5 %   Auto Immature Granulocyte %: 0.3 %   Nucleated RBC: 0 /100 WBCs   Basic Metabolic Panel (01.09.21 @ 10:54)   Sodium, Serum: 140 mmol/L   Potassium, Serum: 4.2 mmol/L   Chloride, Serum: 106 mmol/L   Carbon Dioxide, Serum: 23 mmol/L   Anion Gap, Serum: 11 mmol/L   Blood Urea Nitrogen, Serum: 15 mg/dL   Creatinine, Serum: 1.11 mg/dL   Glucose, Serum: 78 mg/dL   Calcium, Total Serum: 9.0 mg/dL   eGFR if Non : 61  eGFR if : 71
Complete Blood Count + Automated Diff (01.09.21 @ 10:54)   WBC Count: 3.68 K/uL   RBC Count: 3.84 M/uL   Hemoglobin: 11.1 g/dL   Hematocrit: 35.0 %   Mean Cell Volume: 91.1 fl   Mean Cell Hemoglobin: 28.9 pg   Mean Cell Hemoglobin Conc: 31.7 gm/dL   Red Cell Distrib Width: 15.2 %   Platelet Count - Automated: 255 K/uL   Auto Neutrophil #: 1.43 K/uL   Auto Lymphocyte #: 1.55 K/uL   Auto Monocyte #: 0.43 K/uL   Auto Eosinophil #: 0.24 K/uL   Auto Basophil #: 0.02 K/uL   Auto Neutrophil %: 38.9:  Auto Lymphocyte %: 42.1 %   Auto Monocyte %: 11.7 %   Auto Eosinophil %: 6.5 %   Auto Basophil %: 0.5 %   Auto Immature Granulocyte %: 0.3 %   Nucleated RBC: 0 /100 WBCs   Basic Metabolic Panel (01.09.21 @ 10:54)   Sodium, Serum: 140 mmol/L   Potassium, Serum: 4.2 mmol/L   Chloride, Serum: 106 mmol/L   Carbon Dioxide, Serum: 23 mmol/L   Anion Gap, Serum: 11 mmol/L   Blood Urea Nitrogen, Serum: 15 mg/dL   Creatinine, Serum: 1.11 mg/dL   Glucose, Serum: 78 mg/dL   Calcium, Total Serum: 9.0 mg/dL   eGFR if Non : 61  eGFR if African American: 71  Cocaine Metabolite, Urine (01.11.21 @ 13:32)   Cocaine Metabolite, Urine: Positive

## 2021-01-20 NOTE — DISCHARGE NOTE BEHAVIORAL HEALTH - NSBHDCSUICPROTECTFT_PSY_A_CORE
Supportive mother, active AOT/IMT team, medication adherence, ability to state reasons for living, willingness to seek care in moment of crisis

## 2021-01-20 NOTE — DISCHARGE NOTE BEHAVIORAL HEALTH - NSBHDCMEDSFT_PSY_A_CORE
Started on seroquel 100 mg bid, titrated to seroquel 100 mg daily, seroquel 200 mg qhs. Started on melatonin 5 mg qhs. Given Invega Sustenna 234 mg IM on 1/14/2021 with next due dose on 2/11/2021. Started on hydroxyzine 50 mg q6h prn for anxiety or/and insomnia. Pt tolerated medications well, denied side effects.

## 2021-01-20 NOTE — DISCHARGE NOTE BEHAVIORAL HEALTH - NSBHDCSWCOMMENTSFT_PSY_A_CORE
D/C summary E-Mailed to next level of care.  Grethel Cases IMT  [email or web address] [email or web address] on 1/21 at 12:45pm

## 2021-01-20 NOTE — DISCHARGE NOTE BEHAVIORAL HEALTH - MEDICATION SUMMARY - MEDICATIONS TO STOP TAKING
I will STOP taking the medications listed below when I get home from the hospital:    hydrOXYzine hydrochloride 50 mg oral tablet  -- 1 tab(s) by mouth once a day, As Needed -anxiety

## 2021-01-20 NOTE — PROGRESS NOTE BEHAVIORAL HEALTH - NSBHPTASSESSDT_PSY_A_CORE
11-Jan-2021 09:45
15-Enrique-2021 09:15
19-Jan-2021 08:45
10-Enrique-2021 11:04
20-Jan-2021 09:45
12-Jan-2021 09:15
13-Jan-2021 09:00
14-Jan-2021 09:45

## 2021-01-20 NOTE — PROGRESS NOTE BEHAVIORAL HEALTH - THOUGHT CONTENT
Preoccupations/Ruminations
Unremarkable
Suicidality
Preoccupations/Ruminations
Unremarkable

## 2021-01-20 NOTE — DISCHARGE NOTE BEHAVIORAL HEALTH - NSBHDCADMRISKREASONS_PSY_A_CORE
High utilizer of Inpateint/ED services/Poor engagement in outpt treatment/Non-adherence with medications/Co-occur mental health and subst use/Poor residential stability

## 2021-01-20 NOTE — PROGRESS NOTE BEHAVIORAL HEALTH - SECONDARY DX2
Cocaine use disorder
Major depressive disorder
Cocaine use disorder

## 2021-01-20 NOTE — PROGRESS NOTE BEHAVIORAL HEALTH - PRIMARY DX
Substance induced mood disorder
Suicidal ideation
Substance induced mood disorder
Substance induced mood disorder
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
## 2021-01-20 NOTE — DISCHARGE NOTE BEHAVIORAL HEALTH - MEDICATION SUMMARY - MEDICATIONS TO CHANGE
I will SWITCH the dose or number of times a day I take the medications listed below when I get home from the hospital:    melatonin 5 mg oral tablet  -- 1 tab(s) by mouth once a day (at bedtime)    QUEtiapine 100 mg oral tablet  -- 1 tab(s) by mouth in the morning and 2 tabs by mouth at bedtime    Invega Sustenna 234 mg/1.5 mL intramuscular suspension, extended release  -- 234 milligram(s) intramuscular every 4 weeks

## 2021-01-20 NOTE — DISCHARGE NOTE BEHAVIORAL HEALTH - NSBHDCSUICFCTROTHERFT_PSY_A_CORE
Patient and provider identified future stressors as being noncompliance with medication/outpatient follow up, relapse with illicit substances

## 2021-01-20 NOTE — PROGRESS NOTE BEHAVIORAL HEALTH - NSBHATTESTSEENBY_PSY_A_CORE
NP without Attending Psychiatrist
attending Psychiatrist without NP/Trainee
NP without Attending Psychiatrist

## 2021-01-20 NOTE — PROGRESS NOTE BEHAVIORAL HEALTH - NSBHCHARTREVIEWVS_PSY_A_CORE FT
Vital Signs Last 24 Hrs  T(C): 36.9 (10 Enrique 2021 15:45), Max: 36.9 (10 Enrique 2021 15:45)  T(F): 98.5 (10 Enrique 2021 15:45), Max: 98.5 (10 Enrique 2021 15:45)  HR: 54 (10 Enrique 2021 15:45) (54 - 54)  BP: 93/57 (10 Enrique 2021 15:45) (93/57 - 93/57)  BP(mean): --  RR: 16 (10 Enrique 2021 15:45) (16 - 16)  SpO2: --
Vital Signs Last 24 Hrs  T(C): 35.8 (15 Enrique 2021 06:04), Max: 35.8 (15 Enrique 2021 06:04)  T(F): 96.5 (15 Enrique 2021 06:04), Max: 96.5 (15 Enrique 2021 06:04)  HR: 74 (15 Enrique 2021 06:04) (74 - 74)  BP: 90/53 (15 Enrique 2021 06:04) (90/53 - 90/53)  BP(mean): --  RR: 18 (15 Enrique 2021 06:04) (18 - 18)  SpO2: --
Vital Signs Last 24 Hrs  T(C): 36.8 (18 Jan 2021 15:36), Max: 36.8 (18 Jan 2021 15:36)  T(F): 98.2 (18 Jan 2021 15:36), Max: 98.2 (18 Jan 2021 15:36)  HR: 59 (18 Jan 2021 15:36) (59 - 59)  BP: 100/59 (18 Jan 2021 15:36) (100/59 - 100/59)  BP(mean): --  RR: 16 (18 Jan 2021 15:36) (16 - 16)  SpO2: --
Vital Signs Last 24 Hrs  T(C): 35.9 (20 Jan 2021 09:00), Max: 35.9 (20 Jan 2021 09:00)  T(F): 96.6 (20 Jan 2021 09:00), Max: 96.6 (20 Jan 2021 09:00)  HR: 58 (20 Jan 2021 09:00) (58 - 58)  BP: 119/57 (20 Jan 2021 09:00) (119/57 - 119/57)  BP(mean): --  RR: 16 (20 Jan 2021 09:00) (16 - 16)  SpO2: --
Vital Signs Last 24 Hrs  T(C): 35.9 (12 Jan 2021 07:59), Max: 37.3 (11 Jan 2021 15:49)  T(F): 96.6 (12 Jan 2021 07:59), Max: 99.2 (11 Jan 2021 15:49)  HR: 105 (12 Jan 2021 07:59) (74 - 105)  BP: 110/61 (12 Jan 2021 07:59) (101/63 - 110/61)  BP(mean): --  RR: 18 (12 Jan 2021 07:59) (16 - 18)  SpO2: --
Vital Signs Last 24 Hrs  T(C): 36.2 (13 Jan 2021 06:14), Max: 36.2 (13 Jan 2021 06:14)  T(F): 97.2 (13 Jan 2021 06:14), Max: 97.2 (13 Jan 2021 06:14)  HR: 58 (13 Jan 2021 06:14) (58 - 58)  BP: 95/55 (13 Jan 2021 06:14) (95/55 - 95/55)  BP(mean): --  RR: 18 (13 Jan 2021 06:14) (18 - 18)  SpO2: --
Vital Signs Last 24 Hrs  T(C): 36.9 (13 Jan 2021 15:30), Max: 36.9 (13 Jan 2021 15:30)  T(F): 98.5 (13 Jan 2021 15:30), Max: 98.5 (13 Jan 2021 15:30)  HR: 77 (13 Jan 2021 15:30) (77 - 77)  BP: 104/57 (13 Jan 2021 15:30) (104/57 - 104/57)  BP(mean): --  RR: 16 (13 Jan 2021 15:30) (16 - 16)  SpO2: --

## 2021-01-20 NOTE — DISCHARGE NOTE BEHAVIORAL HEALTH - MEDICATION SUMMARY - MEDICATIONS TO TAKE
I will START or STAY ON the medications listed below when I get home from the hospital:    Invega Sustenna 234 mg/1.5 mL intramuscular suspension, extended release  -- 234 milligram(s) intramuscular every 4 weeks Continue to take as prescribed until told otherwise by your provider  Last dose given: 1/14/2021  Next dose due: 2/11/2021  -- Indication: For Schizoaffective disorder, unspecified type    QUEtiapine 200 mg oral tablet  -- 1 tab(s) by mouth once a day (at bedtime) x 7 days Continue to take as prescribed until told otherwise by your provider  -- Indication: For Schizoaffective disorder, unspecified type    QUEtiapine 100 mg oral tablet  -- 1 tab(s) by mouth once a day x 7 days Continue to take as prescribed until told otherwise by your provider  -- Indication: For Schizoaffective disorder, unspecified type    melatonin 3 mg oral tablet  -- 2 tab(s) by mouth once a day (at bedtime) x 7 days Continue to take as prescribed until told otherwise by your provider  -- Indication: For Insomnia    nicotine  -- 1 gum by mouth every 2 hours, As Needed -smoking cessation Continue to take as prescribed until told otherwise by your provider  -- Indication: For Smoking cessation

## 2021-01-20 NOTE — DISCHARGE NOTE BEHAVIORAL HEALTH - NSBHDCSUICFCTRMIT_PSY_A_CORE
Pt with active AOT, case management and IMT team. Patient has been medication compliant, not endorsing any side effects. Patient verbalizing reasons for living. Patient to use positive coping skills learned during the course of the inpatient hospitalization, STOP Skill. Patient verbalized willingness to seek care in moment of crisis. Patient is agreeable that should she have any thoughts of hurting herself or others, she will call 911, return to the ED or call the National Suicide Prevention Lifeline, immediately.

## 2021-01-20 NOTE — PROGRESS NOTE BEHAVIORAL HEALTH - RISK ASSESSMENT
Chronic risk factors include history of psychiatric illness, history of mood symptoms, history of substance use, prior suicide attempts. Acute factors include high suspicion of recent substance use, current mood symptoms, current endorsement of SI. Mitigating factors include patient’s vagueness without intent to die, help-seeking behavior, and stable housing with friend (unconfirmed). Overall, patient has a moderate chronic risk but low acute risk of suicide.
mild to moderate
Chronic risk factors include history of psychiatric illness, history of mood symptoms, history of substance use, prior suicide attempts. Acute factors include high suspicion of recent substance use, current mood symptoms, current endorsement of SI. Mitigating factors include patient’s vagueness without intent to die, help-seeking behavior, and stable housing with friend (unconfirmed). Overall, patient has a moderate chronic risk but low acute risk of suicide.
Chronic risk factors include history of psychiatric illness, history of mood symptoms, history of substance use, prior suicide attempts. Acute factors include high suspicion of recent substance use, current mood symptoms, current endorsement of SI. Mitigating factors include patient’s vagueness without intent to die, help-seeking behavior, and stable housing with friend (unconfirmed). Overall, patient has a moderate chronic risk but low acute risk of suicide.

## 2021-01-20 NOTE — PROGRESS NOTE BEHAVIORAL HEALTH - SUMMARY
41 y/o woman, ?domiciled with friend, unemployed, PPH: schizoaffective disorder, polysubstance use (alcohol, crack cocaine), multiple prior hospitalizations, most recently discharged from [facility] on 12/31/20 after admission for SIMD, hx of Invega IM, active AOT/IMT, previous suicide attempts, with remote PMH of seizure disorder as per chart review, BIB self to [facility] ED reporting sadness, similar presentation as prior admission. Her presentation appears consistent with decompensation of schizoaffective d/o in context of high substance abuse (+UDS, cocaine) and medication nonadherence (pt is overdue for Invega Sustenna NAGY). R/o secondary gain, as per , pt with hx of relapsing on substances and gaining hospital admission for shelter.    #Schizoaffective d/o; R/O SIMD  -start seroquel 100 mg bid --> titrate seroquel 100 mg daily, seroquel 200 mg qhs  -c/w seroquel 50 mg q6h prn for agitation  -c/w Invega Sustenna 234 mg IM q4 weeks --> given on 1/14/2021, next dose due 2/11/21  -start hydroxyzine 50 mg q6h prn for anxiety or/and insomnia    #Cocaine use disorder  -no treatment protocol indicated at this time  -encourage rehab

## 2021-01-20 NOTE — DISCHARGE NOTE BEHAVIORAL HEALTH - PAST PSYCHIATRIC HISTORY
As per chart review, patient has a history of multiple admissions for SI/SIMD ([facility], [facility], [facility]; most recent [facility] from 12/23/20-12/31/20), active AOT as per , IMT ([facility])

## 2021-01-20 NOTE — PROGRESS NOTE BEHAVIORAL HEALTH - SECONDARY DX1
Cocaine abuse
Schizoaffective disorder, unspecified type

## 2021-01-21 PROCEDURE — 99238 HOSP IP/OBS DSCHRG MGMT 30/<: CPT

## 2021-01-21 RX ORDER — PALIPERIDONE 1.5 MG/1
234 TABLET, EXTENDED RELEASE ORAL
Qty: 0 | Refills: 0 | DISCHARGE

## 2021-01-21 RX ORDER — NICOTINE POLACRILEX 2 MG
1 GUM BUCCAL
Qty: 0 | Refills: 0 | DISCHARGE
Start: 2021-01-21

## 2021-01-21 RX ORDER — QUETIAPINE FUMARATE 200 MG/1
1 TABLET, FILM COATED ORAL
Qty: 7 | Refills: 0
Start: 2021-01-21 | End: 2021-01-27

## 2021-01-21 RX ORDER — LANOLIN ALCOHOL/MO/W.PET/CERES
2 CREAM (GRAM) TOPICAL
Qty: 14 | Refills: 0
Start: 2021-01-21 | End: 2021-01-27

## 2021-01-21 RX ADMIN — QUETIAPINE FUMARATE 100 MILLIGRAM(S): 200 TABLET, FILM COATED ORAL at 07:57
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
## 2021-01-27 DIAGNOSIS — F17.210 NICOTINE DEPENDENCE, CIGARETTES, UNCOMPLICATED: ICD-10-CM

## 2021-01-27 DIAGNOSIS — F10.10 ALCOHOL ABUSE, UNCOMPLICATED: ICD-10-CM

## 2021-01-27 DIAGNOSIS — R45.851 SUICIDAL IDEATIONS: ICD-10-CM

## 2021-01-27 DIAGNOSIS — Z91.5 PERSONAL HISTORY OF SELF-HARM: ICD-10-CM

## 2021-01-27 DIAGNOSIS — F14.10 COCAINE ABUSE, UNCOMPLICATED: ICD-10-CM
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
## 2021-01-27 DIAGNOSIS — F19.94 OTHER PSYCHOACTIVE SUBSTANCE USE, UNSPECIFIED WITH PSYCHOACTIVE SUBSTANCE-INDUCED MOOD DISORDER: ICD-10-CM

## 2021-01-27 DIAGNOSIS — F25.9 SCHIZOAFFECTIVE DISORDER, UNSPECIFIED: ICD-10-CM

## 2021-01-27 DIAGNOSIS — G47.00 INSOMNIA, UNSPECIFIED: ICD-10-CM

## 2021-02-02 ENCOUNTER — EMERGENCY (EMERGENCY)
Facility: HOSPITAL | Age: 41
LOS: 1 days | Discharge: ANOTHER HOSPITAL | End: 2021-02-02
Attending: EMERGENCY MEDICINE | Admitting: EMERGENCY MEDICINE
Payer: MEDICARE

## 2021-02-02 VITALS
TEMPERATURE: 100 F | HEART RATE: 74 BPM | DIASTOLIC BLOOD PRESSURE: 51 MMHG | SYSTOLIC BLOOD PRESSURE: 91 MMHG | OXYGEN SATURATION: 100 % | RESPIRATION RATE: 16 BRPM

## 2021-02-02 VITALS
RESPIRATION RATE: 15 BRPM | HEART RATE: 65 BPM | OXYGEN SATURATION: 98 % | HEIGHT: 69 IN | TEMPERATURE: 97 F | DIASTOLIC BLOOD PRESSURE: 71 MMHG | SYSTOLIC BLOOD PRESSURE: 134 MMHG

## 2021-02-02 DIAGNOSIS — R45.851 SUICIDAL IDEATIONS: ICD-10-CM

## 2021-02-02 DIAGNOSIS — Z20.822 CONTACT WITH AND (SUSPECTED) EXPOSURE TO COVID-19: ICD-10-CM

## 2021-02-02 LAB
AMPHET UR-MCNC: NEGATIVE — SIGNIFICANT CHANGE UP
ANION GAP SERPL CALC-SCNC: 12 MMOL/L — SIGNIFICANT CHANGE UP (ref 5–17)
ANISOCYTOSIS BLD QL: SLIGHT — SIGNIFICANT CHANGE UP
APAP SERPL-MCNC: <5 UG/ML — LOW (ref 10–30)
APPEARANCE UR: CLEAR — SIGNIFICANT CHANGE UP
BACTERIA # UR AUTO: ABNORMAL /HPF
BARBITURATES UR SCN-MCNC: NEGATIVE — SIGNIFICANT CHANGE UP
BASOPHILS # BLD AUTO: 0 K/UL — SIGNIFICANT CHANGE UP (ref 0–0.2)
BASOPHILS NFR BLD AUTO: 0 % — SIGNIFICANT CHANGE UP (ref 0–2)
BENZODIAZ UR-MCNC: NEGATIVE — SIGNIFICANT CHANGE UP
BILIRUB UR-MCNC: NEGATIVE — SIGNIFICANT CHANGE UP
BUN SERPL-MCNC: 10 MG/DL — SIGNIFICANT CHANGE UP (ref 7–23)
CALCIUM SERPL-MCNC: 9.1 MG/DL — SIGNIFICANT CHANGE UP (ref 8.4–10.5)
CHLORIDE SERPL-SCNC: 98 MMOL/L — SIGNIFICANT CHANGE UP (ref 96–108)
CO2 SERPL-SCNC: 25 MMOL/L — SIGNIFICANT CHANGE UP (ref 22–31)
COCAINE METAB.OTHER UR-MCNC: POSITIVE
COLOR SPEC: YELLOW — SIGNIFICANT CHANGE UP
COMMENT - URINE: SIGNIFICANT CHANGE UP
CREAT SERPL-MCNC: 0.75 MG/DL — SIGNIFICANT CHANGE UP (ref 0.5–1.3)
DIFF PNL FLD: NEGATIVE — SIGNIFICANT CHANGE UP
EOSINOPHIL # BLD AUTO: 0 K/UL — SIGNIFICANT CHANGE UP (ref 0–0.5)
EOSINOPHIL NFR BLD AUTO: 0 % — SIGNIFICANT CHANGE UP (ref 0–6)
EPI CELLS # UR: ABNORMAL /HPF (ref 0–5)
ETHANOL SERPL-MCNC: <10 MG/DL — SIGNIFICANT CHANGE UP (ref 0–10)
GIANT PLATELETS BLD QL SMEAR: PRESENT — SIGNIFICANT CHANGE UP
GLUCOSE SERPL-MCNC: 100 MG/DL — HIGH (ref 70–99)
GLUCOSE UR QL: NEGATIVE — SIGNIFICANT CHANGE UP
HCT VFR BLD CALC: 33.5 % — LOW (ref 34.5–45)
HGB BLD-MCNC: 10.7 G/DL — LOW (ref 11.5–15.5)
HYPOCHROMIA BLD QL: SLIGHT — SIGNIFICANT CHANGE UP
KETONES UR-MCNC: NEGATIVE — SIGNIFICANT CHANGE UP
LEUKOCYTE ESTERASE UR-ACNC: NEGATIVE — SIGNIFICANT CHANGE UP
LYMPHOCYTES # BLD AUTO: 1.38 K/UL — SIGNIFICANT CHANGE UP (ref 1–3.3)
LYMPHOCYTES # BLD AUTO: 53.4 % — HIGH (ref 13–44)
MACROCYTES BLD QL: SLIGHT — SIGNIFICANT CHANGE UP
MANUAL SMEAR VERIFICATION: SIGNIFICANT CHANGE UP
MCHC RBC-ENTMCNC: 28.8 PG — SIGNIFICANT CHANGE UP (ref 27–34)
MCHC RBC-ENTMCNC: 31.9 GM/DL — LOW (ref 32–36)
MCV RBC AUTO: 90.1 FL — SIGNIFICANT CHANGE UP (ref 80–100)
METHADONE UR-MCNC: NEGATIVE — SIGNIFICANT CHANGE UP
MONOCYTES # BLD AUTO: 0.18 K/UL — SIGNIFICANT CHANGE UP (ref 0–0.9)
MONOCYTES NFR BLD AUTO: 6.9 % — SIGNIFICANT CHANGE UP (ref 2–14)
NEUTROPHILS # BLD AUTO: 0.98 K/UL — LOW (ref 1.8–7.4)
NEUTROPHILS NFR BLD AUTO: 37.1 % — LOW (ref 43–77)
NEUTS BAND # BLD: 0.9 % — SIGNIFICANT CHANGE UP (ref 0–8)
NITRITE UR-MCNC: NEGATIVE — SIGNIFICANT CHANGE UP
OPIATES UR-MCNC: NEGATIVE — SIGNIFICANT CHANGE UP
OVALOCYTES BLD QL SMEAR: SLIGHT — SIGNIFICANT CHANGE UP
PCP SPEC-MCNC: SIGNIFICANT CHANGE UP
PCP UR-MCNC: NEGATIVE — SIGNIFICANT CHANGE UP
PH UR: 7 — SIGNIFICANT CHANGE UP (ref 5–8)
PLAT MORPH BLD: NORMAL — SIGNIFICANT CHANGE UP
PLATELET # BLD AUTO: 179 K/UL — SIGNIFICANT CHANGE UP (ref 150–400)
POLYCHROMASIA BLD QL SMEAR: SLIGHT — SIGNIFICANT CHANGE UP
POTASSIUM SERPL-MCNC: 4.1 MMOL/L — SIGNIFICANT CHANGE UP (ref 3.5–5.3)
POTASSIUM SERPL-SCNC: 4.1 MMOL/L — SIGNIFICANT CHANGE UP (ref 3.5–5.3)
PROT UR-MCNC: ABNORMAL MG/DL
RBC # BLD: 3.72 M/UL — LOW (ref 3.8–5.2)
RBC # FLD: 14.5 % — SIGNIFICANT CHANGE UP (ref 10.3–14.5)
RBC BLD AUTO: ABNORMAL
RBC CASTS # UR COMP ASSIST: < 5 /HPF — SIGNIFICANT CHANGE UP
SALICYLATES SERPL-MCNC: <0.3 MG/DL — LOW (ref 2.8–20)
SARS-COV-2 IGG SERPL QL IA: NEGATIVE — SIGNIFICANT CHANGE UP
SARS-COV-2 IGM SERPL IA-ACNC: 0.07 INDEX — SIGNIFICANT CHANGE UP
SARS-COV-2 RNA SPEC QL NAA+PROBE: DETECTED
SCHISTOCYTES BLD QL AUTO: SLIGHT — SIGNIFICANT CHANGE UP
SMUDGE CELLS # BLD: PRESENT — SIGNIFICANT CHANGE UP
SODIUM SERPL-SCNC: 135 MMOL/L — SIGNIFICANT CHANGE UP (ref 135–145)
SP GR SPEC: 1.02 — SIGNIFICANT CHANGE UP (ref 1–1.03)
THC UR QL: NEGATIVE — SIGNIFICANT CHANGE UP
UROBILINOGEN FLD QL: 1 E.U./DL — SIGNIFICANT CHANGE UP
VARIANT LYMPHS # BLD: 1.7 % — SIGNIFICANT CHANGE UP (ref 0–6)
WBC # BLD: 2.59 K/UL — LOW (ref 3.8–10.5)
WBC # FLD AUTO: 2.59 K/UL — LOW (ref 3.8–10.5)
WBC UR QL: < 5 /HPF — SIGNIFICANT CHANGE UP

## 2021-02-02 PROCEDURE — 99285 EMERGENCY DEPT VISIT HI MDM: CPT

## 2021-02-02 PROCEDURE — 90792 PSYCH DIAG EVAL W/MED SRVCS: CPT | Mod: 95

## 2021-02-02 PROCEDURE — 93010 ELECTROCARDIOGRAM REPORT: CPT

## 2021-02-02 PROCEDURE — 80307 DRUG TEST PRSMV CHEM ANLYZR: CPT

## 2021-02-02 PROCEDURE — 93005 ELECTROCARDIOGRAM TRACING: CPT

## 2021-02-02 PROCEDURE — 85025 COMPLETE CBC W/AUTO DIFF WBC: CPT

## 2021-02-02 PROCEDURE — U0003: CPT

## 2021-02-02 PROCEDURE — 36415 COLL VENOUS BLD VENIPUNCTURE: CPT

## 2021-02-02 PROCEDURE — 80048 BASIC METABOLIC PNL TOTAL CA: CPT

## 2021-02-02 PROCEDURE — 86769 SARS-COV-2 COVID-19 ANTIBODY: CPT

## 2021-02-02 PROCEDURE — 81001 URINALYSIS AUTO W/SCOPE: CPT

## 2021-02-02 PROCEDURE — U0005: CPT

## 2021-02-02 RX ORDER — ACETAMINOPHEN 500 MG
650 TABLET ORAL ONCE
Refills: 0 | Status: COMPLETED | OUTPATIENT
Start: 2021-02-02 | End: 2021-02-02

## 2021-02-02 RX ADMIN — Medication 650 MILLIGRAM(S): at 14:20

## 2021-02-02 NOTE — ED ADULT NURSE REASSESSMENT NOTE - NS ED NURSE REASSESS COMMENT FT1
PT given lunch tray. constant observation in place. will cont to reassess.
Pt awake. provided with breakfast tray. awaiting psych consult. will cont to reassess.
pt is refusing to put the mask on, screaming at the hospital staff - RN, security team, asking bed in a loud voice.
report received from nightshift TEE vidal. Pt sleeping in stretcher. no signs of distress. constant observation in place. will cont to reassess.
social work Nighat involved in transfer placement of pt. constant observation in place. CIWA 0. Pt reports last drink "a couple of days ago, I don't know." denies complaints at this time.
pt is not cooperative to COVID swab, keeps asking food.
pt is sleeping in the stretcher, pt is on constant observation for pt's safety / SI
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
## 2021-02-02 NOTE — ED BEHAVIORAL HEALTH ASSESSMENT NOTE - SUMMARY
41 yo woman, domiciled alone, unemployed, per chart and psyckes hx of Substance Induced Mood Disorder vs Schizophrenia/SAD, polysubstance use (alcohol, cocaine), multiple prior hospitalizations (last in Jan 2021 at [facility]),  hx of invega sustenna last dose Jan 11, per chart previous suicide attempts, with remote hx of seizure disorder per chart, who walked into the ED this morning with complaint of depression and suicidal thoughts.       patient is a poor historian, with highly inconsistent and unreliable report, no collateral available at this time. however, presentation and review of hx suspicious for primary substance induced pathology. will hold for re-evaluation and collateral.

## 2021-02-02 NOTE — ED BEHAVIORAL HEALTH ASSESSMENT NOTE - OTHER PAST PSYCHIATRIC HISTORY (INCLUDE DETAILS REGARDING ONSET, COURSE OF ILLNESS, INPATIENT/OUTPATIENT TREATMENT)
Documented history of schizoaffective disorder, cocaine and alcohol abuse, many past psychiatric admissions, hx of AOT unclear if still active.   On Invega Sustenna from last psychiatric admission to [facility] in January 2021  Documented history of suicide attempt

## 2021-02-02 NOTE — ED BEHAVIORAL HEALTH ASSESSMENT NOTE - RISK ASSESSMENT
Moderate Acute Suicide Risk Assessed at elevated acute risk for suicide given current report of SI and depression, concern for substance intoxication, with hx of substance abuse , unclear if adherent with outpatient care, unclear social supports. Unable to engage in safety planning

## 2021-02-02 NOTE — ED PROVIDER NOTE - CLINICAL SUMMARY MEDICAL DECISION MAKING FREE TEXT BOX
Patient in ED w concern for SI.  Will medically clear and consult with psychiatry team. Patient in ED w concern for SI.  Patient denies plan to harm herself, however states she does not feel safe and is requesting admission.  Patient is medically cleared and psychiatry team is consulted.  Following shift completion, patient's care is handed over to oncoming day team, Dr. Maldonado and PARAM Yarbrough for final disposition pending formal psychuatric recommendations.  Patient is stable at time of transfer of care.

## 2021-02-02 NOTE — ED BEHAVIORAL HEALTH ASSESSMENT NOTE - HPI (INCLUDE ILLNESS QUALITY, SEVERITY, DURATION, TIMING, CONTEXT, MODIFYING FACTORS, ASSOCIATED SIGNS AND SYMPTOMS)
39 yo woman, domiciled alone, unemployed, per chart and psyckes hx of Substance Induced Mood Disorder vs Schizophrenia/SAD, polysubstance use (alcohol, cocaine), multiple prior hospitalizations (last in Jan 2021 at [facility]),  hx of invega sustenna last dose Jan 11, per chart previous suicide attempts, with remote hx of seizure disorder per chart, who walked into the ED this morning with complaint of depression and suicidal thoughts.     Of note, patient is known to this MD from prior assessment with very similar presentation. A review of chart indicates frequent cocaine positive UDS. Additionally, during prior admissions she has at times submitted a written request for discharge only a few days into hospital stay, leaving hospital against recommendation by inpatient team for rehab, with charted primary diagnosis of Substance Induced Mood Disorder.     Patient in ED presents as a poor historian. She is somewhat drowsy. She has notably slurred speech (seemingly not described as baseline in other notes, though similar to last eval with this MD). She provides a poor quality of history. She provides vague responses and when pressed for details they are often conflicting with her own hx/chart. She perseverates on "feeling suicidal" and wanting to admit herself.  She states that SI started yesterday, then states it has been there since discharge, then states she has been feeling well and not suicidal since discharge. Patient says her plan is to OD on pills, she doesn't know what she would take. She says she was taking seroquel since d/c, then says she wasn't. She states that she did cocaine recently, moments later says she never does drugs, then later admits again to possibly recent cocaine use. She denies HI/AVH/PI. She denies acute stressors.     COVID Exposure Screen- Patient     1.        *Have you had a COVID-19 test in the last 21 days?  (  ) Yes   (x  ) No   (  ) Unknown- Reason: ______  IF YES PROCEED TO QUESTION #2. IF NO OR UNKNOWN THEN PLEASE SKIP TO QUESTION #3.  2.        Date of test: ________  3.        3. Do you know the result? (  ) Negative   (  ) Positive   (  ) No result available  4.        *In the past 14 days, have you been around anyone with a positive COVID-19 test?*  (  ) Yes   ( x ) No   (  ) Unknown- Reason (e.g. patient uncertain, sedated, refusing to answer, etc.):  ______  IF YES PROCEED TO QUESTION #5. IF NO or UNKNOWN, PLEASE SKIP TO QUESTION #10  5.        Were you within 6 feet of them for at least 15 minutes? (  ) Yes   (  ) No   (  ) Unknown- Reason: _____  6.        Have you provided care for them? (  ) Yes   (  ) No   (  ) Unknown- Reason: ______  7.        Have you had direct physical contact with them (touched, hugged, or kissed them)? (  ) Yes   (  ) No    (  ) Unknown- Reason: ___  8.        Have you shared eating or drinking utensils with them? (  ) Yes   (  ) No    (  ) Unknown- Reason: ____  9.        Have they sneezed, coughed, or somehow got respiratory droplets on you? (  ) Yes   (  ) No    (  ) Unknown- Reason: ______  10.     *Have you been out of [place] within the past 14 days?*  (  ) Yes   ( x ) No   (  ) Unknown- Reason (e.g. patient uncertain, sedated, refusing to answer, etc.): _______  IF YES PLEASE ANSWER THE FOLLOWING QUESTIONS:  11.     Which state/country have you been to? ______  12.     Were you there over 24 hours? (  ) Yes   (  ) No    (  ) Unknown- Reason: ______  13.     Date of return to [facility]: ______

## 2021-02-02 NOTE — ED ADULT TRIAGE NOTE - OTHER COMPLAINTS
pt c.o SI. no HI. denies psych hx. pt admits to wanting to take pills to end her life. pt eating [employer] in triage. 1:1 initiated.

## 2021-02-02 NOTE — ED BEHAVIORAL HEALTH ASSESSMENT NOTE - DESCRIPTION
patient has been uncooperative, has demanded food. complained of suicidal ideation and wanted to sleep. charted history of seizures reports living alone in [place]

## 2021-02-02 NOTE — ED PROVIDER NOTE - PHYSICAL EXAMINATION
VITAL SIGNS: I have reviewed nursing notes and confirm.  CONSTITUTIONAL: Well-developed; well-nourished; in no acute distress.   SKIN:  Warm and dry, no acute rash.   HEAD:  normocephalic, atraumatic.  EYES: PERRL.  EOM intact; conjunctiva and sclera clear.  ENT: No nasal discharge; airway clear.   NECK: Supple; non tender.  CARD: S1, S2 normal; no murmurs, gallops, or rubs. Regular rate and rhythm.   RESP:  Clear to auscultation b/l, no wheezes, rales or rhonchi.  ABD: Normal bowel sounds; soft; non-distended; non-tender; no guarding/rebound.  EXT: Normal ROM. No clubbing, cyanosis or edema. 2+ pulses to b/l ue/le.  NEURO: Alert, oriented, grossly unremarkable.  5/5 strength x 4 extremities against gravity and external force.  No drift x 4 extremities.  Sensation intact and symmetric x 4 extremities.  No facial asymmetry.    PSYCH: Flat affect, uncooperative and argumentative.

## 2021-02-02 NOTE — ED PROVIDER NOTE - NS ED ROS FT
Constitutional: No fever or chills.   Eyes: No pain, blurry vision, or discharge.  ENMT: No hearing changes, pain, discharge or infections. No neck pain or stiffness.  Cardiac: No chest pain, SOB or edema. No chest pain with exertion.  Respiratory: No cough or respiratory distress. No hemoptysis. No history of asthma or RAD.  GI: No nausea, vomiting, diarrhea or abdominal pain.  : No dysuria, frequency or burning.  MS: No myalgia, muscle weakness, joint pain or back pain.  Neuro: No headache or weakness. No LOC.  Skin: No skin rash.   Psych: + "sad," + suicidal ideations

## 2021-02-02 NOTE — ED ADULT NURSE NOTE - OBJECTIVE STATEMENT
pt to ED for suicidal thoughts. "I don't want to die but I want to take all pills I have to end my life" pt is eating donuts, denies any pain, sob, cp, dizziness, urinary symptoms. denies HI, hallucination, alcohol/drug use.

## 2021-02-02 NOTE — ED BEHAVIORAL HEALTH ASSESSMENT NOTE - NSBHSACONSEQUENCE_PSY_A_CORE FT
unreliable report,, chart notes significant cocaine and EtOH hx. refused rehab at [facility] admission.

## 2021-03-08 NOTE — PROGRESS NOTE BEHAVIORAL HEALTH - NS ED BHA MSE SPEECH VOLUME
Normal
Soft
1) Continue current diet as tolerated. 2) Diet education reinforcement as needed. 3) RD to remain available and follow-up as medically appropriate.

## 2021-03-09 ENCOUNTER — EMERGENCY (EMERGENCY)
Facility: HOSPITAL | Age: 41
LOS: 1 days | Discharge: ANOTHER HOSPITAL | End: 2021-03-09
Attending: EMERGENCY MEDICINE | Admitting: EMERGENCY MEDICINE
Payer: MEDICARE

## 2021-03-09 VITALS
TEMPERATURE: 98 F | RESPIRATION RATE: 18 BRPM | HEIGHT: 69 IN | DIASTOLIC BLOOD PRESSURE: 87 MMHG | OXYGEN SATURATION: 100 % | SYSTOLIC BLOOD PRESSURE: 136 MMHG | HEART RATE: 69 BPM

## 2021-03-09 DIAGNOSIS — F25.9 SCHIZOAFFECTIVE DISORDER, UNSPECIFIED: ICD-10-CM

## 2021-03-09 DIAGNOSIS — R45.851 SUICIDAL IDEATIONS: ICD-10-CM

## 2021-03-09 DIAGNOSIS — F14.10 COCAINE ABUSE, UNCOMPLICATED: ICD-10-CM

## 2021-03-09 DIAGNOSIS — F20.9 SCHIZOPHRENIA, UNSPECIFIED: ICD-10-CM

## 2021-03-09 DIAGNOSIS — Z88.8 ALLERGY STATUS TO OTHER DRUGS, MEDICAMENTS AND BIOLOGICAL SUBSTANCES: ICD-10-CM

## 2021-03-09 DIAGNOSIS — Z20.822 CONTACT WITH AND (SUSPECTED) EXPOSURE TO COVID-19: ICD-10-CM

## 2021-03-09 LAB
ANION GAP SERPL CALC-SCNC: 6 MMOL/L — SIGNIFICANT CHANGE UP (ref 5–17)
APAP SERPL-MCNC: <5 UG/ML — LOW (ref 10–30)
BASOPHILS # BLD AUTO: 0.03 K/UL — SIGNIFICANT CHANGE UP (ref 0–0.2)
BASOPHILS NFR BLD AUTO: 0.7 % — SIGNIFICANT CHANGE UP (ref 0–2)
BUN SERPL-MCNC: 10 MG/DL — SIGNIFICANT CHANGE UP (ref 7–23)
CALCIUM SERPL-MCNC: 9.4 MG/DL — SIGNIFICANT CHANGE UP (ref 8.4–10.5)
CHLORIDE SERPL-SCNC: 104 MMOL/L — SIGNIFICANT CHANGE UP (ref 96–108)
CO2 SERPL-SCNC: 30 MMOL/L — SIGNIFICANT CHANGE UP (ref 22–31)
CREAT SERPL-MCNC: 1.09 MG/DL — SIGNIFICANT CHANGE UP (ref 0.5–1.3)
EOSINOPHIL # BLD AUTO: 0.14 K/UL — SIGNIFICANT CHANGE UP (ref 0–0.5)
EOSINOPHIL NFR BLD AUTO: 3.2 % — SIGNIFICANT CHANGE UP (ref 0–6)
ETHANOL SERPL-MCNC: <10 MG/DL — SIGNIFICANT CHANGE UP (ref 0–10)
GLUCOSE SERPL-MCNC: 137 MG/DL — HIGH (ref 70–99)
HCG SERPL-ACNC: 2 MIU/ML — SIGNIFICANT CHANGE UP
HCT VFR BLD CALC: 33.8 % — LOW (ref 34.5–45)
HGB BLD-MCNC: 11.1 G/DL — LOW (ref 11.5–15.5)
IMM GRANULOCYTES NFR BLD AUTO: 0.2 % — SIGNIFICANT CHANGE UP (ref 0–1.5)
LYMPHOCYTES # BLD AUTO: 1.31 K/UL — SIGNIFICANT CHANGE UP (ref 1–3.3)
LYMPHOCYTES # BLD AUTO: 30.2 % — SIGNIFICANT CHANGE UP (ref 13–44)
MCHC RBC-ENTMCNC: 29.1 PG — SIGNIFICANT CHANGE UP (ref 27–34)
MCHC RBC-ENTMCNC: 32.8 GM/DL — SIGNIFICANT CHANGE UP (ref 32–36)
MCV RBC AUTO: 88.5 FL — SIGNIFICANT CHANGE UP (ref 80–100)
MONOCYTES # BLD AUTO: 0.47 K/UL — SIGNIFICANT CHANGE UP (ref 0–0.9)
MONOCYTES NFR BLD AUTO: 10.8 % — SIGNIFICANT CHANGE UP (ref 2–14)
NEUTROPHILS # BLD AUTO: 2.38 K/UL — SIGNIFICANT CHANGE UP (ref 1.8–7.4)
NEUTROPHILS NFR BLD AUTO: 54.9 % — SIGNIFICANT CHANGE UP (ref 43–77)
NRBC # BLD: 0 /100 WBCS — SIGNIFICANT CHANGE UP (ref 0–0)
PLATELET # BLD AUTO: 276 K/UL — SIGNIFICANT CHANGE UP (ref 150–400)
POTASSIUM SERPL-MCNC: 4 MMOL/L — SIGNIFICANT CHANGE UP (ref 3.5–5.3)
POTASSIUM SERPL-SCNC: 4 MMOL/L — SIGNIFICANT CHANGE UP (ref 3.5–5.3)
RBC # BLD: 3.82 M/UL — SIGNIFICANT CHANGE UP (ref 3.8–5.2)
RBC # FLD: 15.1 % — HIGH (ref 10.3–14.5)
SALICYLATES SERPL-MCNC: <0.3 MG/DL — LOW (ref 2.8–20)
SARS-COV-2 IGG SERPL QL IA: POSITIVE
SARS-COV-2 IGM SERPL IA-ACNC: 26.3 INDEX — HIGH
SARS-COV-2 RNA SPEC QL NAA+PROBE: SIGNIFICANT CHANGE UP
SODIUM SERPL-SCNC: 140 MMOL/L — SIGNIFICANT CHANGE UP (ref 135–145)
WBC # BLD: 4.34 K/UL — SIGNIFICANT CHANGE UP (ref 3.8–10.5)
WBC # FLD AUTO: 4.34 K/UL — SIGNIFICANT CHANGE UP (ref 3.8–10.5)

## 2021-03-09 PROCEDURE — 90792 PSYCH DIAG EVAL W/MED SRVCS: CPT

## 2021-03-09 PROCEDURE — 93010 ELECTROCARDIOGRAM REPORT: CPT

## 2021-03-09 PROCEDURE — 99285 EMERGENCY DEPT VISIT HI MDM: CPT

## 2021-03-09 NOTE — ED BEHAVIORAL HEALTH ASSESSMENT NOTE - VIOLENCE RISK FACTORS:
Substance abuse Substance abuse/Irritability/History of violation of a legal mandate (e.g., parole, probation, AOT)

## 2021-03-09 NOTE — ED PROVIDER NOTE - OBJECTIVE STATEMENT
40F, domiciled alone, unemployed, per chart pt with hx of Substance Induced Mood Disorder vs Schizophrenia/SAD, polysubstance use (alcohol, cocaine), multiple prior hospitalizations (last in Jan 2021 at [facility]),  hx of invega sustenna last dose Jan 11, per chart previous suicide attempts, with remote hx of seizure disorder per chart, who walked into the ED with complaint of suicidal thoughts. Pt states "I want to be admitted voluntarily to psych." SHe denies having a plan, denies HI or hallucinations, denies recent substance abuse. Has not been compliant with her Seroquel. Demanding a stretcher and food. Initially refusing labs, EKG and covid swab.

## 2021-03-09 NOTE — ED BEHAVIORAL HEALTH ASSESSMENT NOTE - DIFFERENTIAL
Schizophrenia, cocaine use disorder, r/o substance induced mood/psychotic disorder Schizophrenia v schizoaffective d/o, cocaine use disorder, r/o substance induced mood/psychotic disorder

## 2021-03-09 NOTE — ED ADULT NURSE REASSESSMENT NOTE - NS ED NURSE REASSESS COMMENT FT1
Report given to Ghada OLIVEIRA at [facility] 3. Transportation via EMS set up at this time. VSS. 1:1 sitter remains at bedside. Pt updated on POC.

## 2021-03-09 NOTE — ED BEHAVIORAL HEALTH ASSESSMENT NOTE - CURRENT MEDICATION
Invega Sustenna 234mg IM last administered 2/11/2021 at [facility] per ICM team. Unknown if adherent with prescribed Seroquel Invega Sustenna 234mg IM last administered 2/11/2021 at [facility] per ICM team. Unknown if adherent with prescribed Seroquel - previously nonadherent
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
## 2021-03-09 NOTE — ED PROVIDER NOTE - PROGRESS NOTE DETAILS
patient refusing to be admitted as "involuntary" but unable to participate in meaningful conversation, repeating that she wants to be "voluntary"; however she refuses to go with EMS crew until this is done.  Reviewed documentation and she is to agitated to go with the EMS crew at this point.  Will give olanzapine 5 mg IM for agitation.

## 2021-03-09 NOTE — ED BEHAVIORAL HEALTH ASSESSMENT NOTE - CASE SUMMARY
41yo woman with h/o schizoaffective d/o v. schizophrenia, cocaine abuse, many past psychiatric admissions followed by [facility], on AOT, receiving Invega Sustenna as outpt last 2/11, who presents with psychosis and SI in setting of suspected recent substance abuse and chronic nonadherence with outpt tx. Given acuity of SI and psychosis posing an acute risk of harm to self, requires psychiatric admission for safety and stabilization. For 2PC admission as unable to participate in voluntary admission process.
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
## 2021-03-09 NOTE — ED BEHAVIORAL HEALTH ASSESSMENT NOTE - ADDITIONAL DETAILS ALL
Pt endorsing distressing suicidal thoughts, did not elaborate on details. Per prior chart, pt with h/o at least one suicide attempt, no known recent attempts
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
## 2021-03-09 NOTE — ED ADULT NURSE REASSESSMENT NOTE - NS ED NURSE REASSESS COMMENT FT1
pt resting comfortable, continues to refused ekg, provide urine or take VS. constant observation in place. will continue to monitor.

## 2021-03-09 NOTE — ED ADULT NURSE NOTE - OBJECTIVE STATEMENT
39 y/o female BIBA c/o suicidal ideation. upon arrival pt requesting  and provided a bed to lay down, tuna sandwich and orange juice. pt states " Im suicidal" denies having visual or auditory hallucinations. does not have a plan at this time. refused blood work and Covid 19 swab. states " im here to see a psychiatrist, I don't have covid I wont do blood work" pt was explained process in the ED. continue to refused. constants observation in place. MD Hair made aware.

## 2021-03-09 NOTE — ED ADULT TRIAGE NOTE - CHIEF COMPLAINT QUOTE
Patient states, "I am suicidal and I want to be admitted."  Patient denies any HI, AH/VH or any other complaints at this time.
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
## 2021-03-09 NOTE — ED BEHAVIORAL HEALTH ASSESSMENT NOTE - HPI (INCLUDE ILLNESS QUALITY, SEVERITY, DURATION, TIMING, CONTEXT, MODIFYING FACTORS, ASSOCIATED SIGNS AND SYMPTOMS)
39 yo woman, domiciled alone, unemployed, per chart and psyckes hx of Substance Induced Mood Disorder vs Schizophrenia/SAD, polysubstance use (alcohol, cocaine), multiple prior hospitalizations (last in Jan 2021 at [facility]),  hx of invega sustenna unknown last dose, per chart previous suicide attempts, with remote hx of seizure disorder per chart, who walked into the ED this morning with complaint of depression and suicidal thoughts.  She is on a stratcher with the sheets covering her entirely and sleeping. Pt is not cooperative with history and not participating much.  She is drowsy and slurring her words.  Pt repeats that she is feeling suicidal and would like to be admitted.  She is not answering other questions.  She also said that she "does not feel good". 39 yo woman, domiciled alone, unemployed, per chart and psyckes hx of Substance Induced Mood Disorder vs Schizophrenia/SAD, polysubstance use (alcohol, cocaine), multiple prior hospitalizations (last February 2021 to [facility], Jan 2021 at [facility]),  hx of invega sustenna last adminstered 2/11/2021, per chart previous suicide attempts, with remote hx of seizure disorder per chart, who walked into the ED this morning with complaint of depression and suicidal thoughts.      On psychiatric evaluation, pt found lying on a stretcher with the sheets covering her entirely and sleeping. Pt is not cooperative with history and not participating much.  She is drowsy and slurring her words.  Pt repeats that she is feeling suicidal and would like to be admitted.  She did not answer other questions.  She also said that she "does not feel good". 41 yo woman, domiciled with mother, unemployed, per chart and psyckes hx of Substance Induced Mood Disorder vs Schizophrenia/SAD, polysubstance use (alcohol, cocaine), multiple prior hospitalizations (last February 2021 to [facility], Jan 2021 at [facility]),  hx of invega sustenna last administered 2/11/2021, per chart previous suicide attempts, with remote hx of seizure disorder per chart, who walked into the ED this morning with complaint of depression and suicidal thoughts.      On psychiatric evaluation, pt found lying on a stretcher with the sheets covering her entirely and sleeping. Pt is not cooperative with history with limited participation, drowsy and slurring her words.  Pt repeats that she is feeling suicidal and would like to be admitted.  She did not answer other questions including re: suicide plan or recent attempts. She stated "I already told you" to many questions that had not been answered. She was unable to state whether she had used any substances. She was unable to state whether she'd been experiencing hallucinations or other psychotic symptoms.  She did state that she "does not feel good" and became irritable when asked for elaboration. She refused further interview and would not participate in discussion re: psychiatric admission.    Contacted pt's IMT, [facility], and spoke with Traci Conteh [facility] ([phone]) who reports pt with many recent hospitalizations last [facility] in February where she received Invega Sustenna on 2/11, also seen at [facility] ED on 3/2-3/3 after which time pt has been missing from IMT, suspected to be using cocaine as she tends to disappear and use substances at the beginning of the month after receiving public assistance check. No known other substance use. IMT in favor of psychiatric admission given severity of psychotic d/o and substance abuse, with h/o suicide attempt (though no known recent attempts).

## 2021-03-09 NOTE — ED PROVIDER NOTE - CLINICAL SUMMARY MEDICAL DECISION MAKING FREE TEXT BOX
Pt with above Past medical and psychiatric hx who p/w suicidal ideations, multiple similar presentation and psych hospitalizations for the same, initially uncooperative with medical clearance workup, now agreeable. Pt placed on 1:1, plan for medical clearance and psych consult.     Pt seen by psych, they recommend involuntary admission for psychiatric stabilization.   Invol paperwork completed and pending hospital for transfer. Pt with above Past medical and psychiatric hx who p/w suicidal ideations, multiple similar presentation and psych hospitalizations for the same, initially uncooperative with medical clearance workup, now agreeable. Pt placed on 1:1, plan for medical clearance and psych consult.     Pt seen by psych, they recommend involuntary admission for psychiatric stabilization.   Invol paperwork completed and pending hospital for transfer. CM and SW aware and working on placement.

## 2021-03-09 NOTE — ED BEHAVIORAL HEALTH ASSESSMENT NOTE - DESCRIPTION
Pt brought in by self to ED, calm, initially uncooperative with assessment but later agreeable to labwork. Maintained on 1:1 observation. No reported agitation.   BAL <10, utox pending. Pt brought in by self to ED, calm, initially uncooperative with assessment but later agreeable to labwork. Maintained on 1:1 observation. No reported agitation.   BAL <10, utox pending.    Per ED documentation, "Pt states "I want to be admitted voluntarily to psych." SHe denies having a plan, denies HI or hallucinations, denies recent substance abuse. Has not been compliant with her Seroquel. Demanding a stretcher and food." remote h/o sz disorder per past chart per prior chart, lives with mother

## 2021-03-09 NOTE — ED PROVIDER NOTE - CARE PLAN
Principal Discharge DX:	Suicidal ideations   Principal Discharge DX:	Suicidal ideations  Secondary Diagnosis:	Schizophrenia

## 2021-03-09 NOTE — ED BEHAVIORAL HEALTH ASSESSMENT NOTE - OTHER
Did not assess gait not yet identified unable to assess - with mother per prior charts impaired given psychosis and SI, though seeking care in hospital unable to assess

## 2021-03-09 NOTE — ED BEHAVIORAL HEALTH ASSESSMENT NOTE - PSYCHIATRIC ISSUES AND PLAN (INCLUDE STANDING AND PRN MEDICATION)
Seroquel PRN for agitation. On long-acting injectable antipsychotic (Invega Sustenna). Note listed haldol allergy olanzapine PRN for agitation. On long-acting injectable antipsychotic (Invega Sustenna). Note listed haldol allergy

## 2021-03-09 NOTE — ED PROVIDER NOTE - PHYSICAL EXAMINATION
GEN: Disheveled, Well developed, well nourished, awake, alert, oriented to person, place, time/situation and in no apparent distress. NTAF  ENT: Airway patent, Nasal mucosa clear. Mouth with normal mucosa.  EYES: Clear bilaterally. PERRL, EOMI  RESPIRATORY: Breathing comfortably with normal RR. No W/C/R, no hypoxia or resp distress.  CARDIAC: Regular rate and rhythm, no M/R/G  ABDOMEN: Soft, nontender, +bowel sounds, no rebound, rigidity, or guarding.  MSK: Range of motion is not limited, no deformities noted.  NEURO: Alert and oriented x 3. Cn 2-12 intact. Strength 5/5 and sensation intact in all 4 extremities. Gait normal.   SKIN: Skin normal color for race, warm, dry and intact. No evidence of rash.  PSYCH: Alert and oriented to person, place, time/situation. irritable mood and affect, intermittently yelling at staff. no apparent risk to self or others.

## 2021-03-10 ENCOUNTER — INPATIENT (INPATIENT)
Facility: HOSPITAL | Age: 41
LOS: 11 days | Discharge: HOME | End: 2021-03-22
Attending: PSYCHIATRY & NEUROLOGY | Admitting: PSYCHIATRY & NEUROLOGY
Payer: MEDICARE

## 2021-03-10 VITALS
HEART RATE: 71 BPM | OXYGEN SATURATION: 98 % | TEMPERATURE: 98 F | RESPIRATION RATE: 18 BRPM | SYSTOLIC BLOOD PRESSURE: 98 MMHG | DIASTOLIC BLOOD PRESSURE: 63 MMHG

## 2021-03-10 VITALS — WEIGHT: 130.07 LBS

## 2021-03-10 DIAGNOSIS — F20.9 SCHIZOPHRENIA, UNSPECIFIED: ICD-10-CM

## 2021-03-10 PROCEDURE — 93005 ELECTROCARDIOGRAM TRACING: CPT

## 2021-03-10 PROCEDURE — 99222 1ST HOSP IP/OBS MODERATE 55: CPT

## 2021-03-10 PROCEDURE — 99285 EMERGENCY DEPT VISIT HI MDM: CPT | Mod: 25

## 2021-03-10 RX ORDER — FLUPHENAZINE HYDROCHLORIDE 1 MG/1
5 TABLET, FILM COATED ORAL ONCE
Refills: 0 | Status: DISCONTINUED | OUTPATIENT
Start: 2021-03-10 | End: 2021-03-22

## 2021-03-10 RX ORDER — DIPHENHYDRAMINE HCL 50 MG
50 CAPSULE ORAL ONCE
Refills: 0 | Status: DISCONTINUED | OUTPATIENT
Start: 2021-03-10 | End: 2021-03-22

## 2021-03-10 RX ORDER — LANOLIN ALCOHOL/MO/W.PET/CERES
3 CREAM (GRAM) TOPICAL AT BEDTIME
Refills: 0 | Status: DISCONTINUED | OUTPATIENT
Start: 2021-03-10 | End: 2021-03-12

## 2021-03-10 RX ORDER — QUETIAPINE FUMARATE 200 MG/1
50 TABLET, FILM COATED ORAL ONCE
Refills: 0 | Status: COMPLETED | OUTPATIENT
Start: 2021-03-10 | End: 2021-03-10

## 2021-03-10 RX ORDER — OLANZAPINE 15 MG/1
5 TABLET, FILM COATED ORAL ONCE
Refills: 0 | Status: COMPLETED | OUTPATIENT
Start: 2021-03-10 | End: 2021-03-10

## 2021-03-10 RX ORDER — DIPHENHYDRAMINE HCL 50 MG
50 CAPSULE ORAL EVERY 6 HOURS
Refills: 0 | Status: DISCONTINUED | OUTPATIENT
Start: 2021-03-10 | End: 2021-03-22

## 2021-03-10 RX ORDER — RISPERIDONE 4 MG/1
1 TABLET ORAL AT BEDTIME
Refills: 0 | Status: DISCONTINUED | OUTPATIENT
Start: 2021-03-10 | End: 2021-03-12

## 2021-03-10 RX ORDER — PALIPERIDONE 1.5 MG/1
234 TABLET, EXTENDED RELEASE ORAL ONCE
Refills: 0 | Status: COMPLETED | OUTPATIENT
Start: 2021-03-11 | End: 2021-03-11

## 2021-03-10 RX ORDER — HYDROXYZINE HCL 10 MG
50 TABLET ORAL EVERY 6 HOURS
Refills: 0 | Status: DISCONTINUED | OUTPATIENT
Start: 2021-03-10 | End: 2021-03-10

## 2021-03-10 RX ORDER — HYDROXYZINE HCL 10 MG
50 TABLET ORAL EVERY 6 HOURS
Refills: 0 | Status: DISCONTINUED | OUTPATIENT
Start: 2021-03-10 | End: 2021-03-22

## 2021-03-10 RX ORDER — OLANZAPINE 15 MG/1
5 TABLET, FILM COATED ORAL ONCE
Refills: 0 | Status: DISCONTINUED | OUTPATIENT
Start: 2021-03-10 | End: 2021-03-10

## 2021-03-10 RX ORDER — FLUPHENAZINE HYDROCHLORIDE 1 MG/1
5 TABLET, FILM COATED ORAL EVERY 6 HOURS
Refills: 0 | Status: DISCONTINUED | OUTPATIENT
Start: 2021-03-10 | End: 2021-03-22

## 2021-03-10 RX ADMIN — Medication 3 MILLIGRAM(S): at 21:52

## 2021-03-10 RX ADMIN — RISPERIDONE 1 MILLIGRAM(S): 4 TABLET ORAL at 21:01

## 2021-03-10 RX ADMIN — QUETIAPINE FUMARATE 50 MILLIGRAM(S): 200 TABLET, FILM COATED ORAL at 21:51

## 2021-03-10 RX ADMIN — OLANZAPINE 5 MILLIGRAM(S): 15 TABLET, FILM COATED ORAL at 01:51

## 2021-03-10 NOTE — BH INPATIENT PSYCHIATRY ASSESSMENT NOTE - HPI (INCLUDE ILLNESS QUALITY, SEVERITY, DURATION, TIMING, CONTEXT, MODIFYING FACTORS, ASSOCIATED SIGNS AND SYMPTOMS)
Per ED  assessment dated 3/9/21:  "39 yo woman, domiciled with mother, unemployed, per chart and psyckes hx of Substance Induced Mood Disorder vs Schizophrenia/SAD, polysubstance use (alcohol, cocaine), multiple prior hospitalizations (last February 2021 to [facility], Jan 2021 at [facility]),  hx of invega sustenna last administered 2/11/2021, per chart previous suicide attempts, with remote hx of seizure disorder per chart, who walked into the ED this morning with complaint of depression and suicidal thoughts.      On psychiatric evaluation, pt found lying on a stretcher with the sheets covering her entirely and sleeping. Pt is not cooperative with history with limited participation, drowsy and slurring her words.  Pt repeats that she is feeling suicidal and would like to be admitted.  She did not answer other questions including re: suicide plan or recent attempts. She stated "I already told you" to many questions that had not been answered. She was unable to state whether she had used any substances. She was unable to state whether she'd been experiencing hallucinations or other psychotic symptoms.  She did state that she "does not feel good" and became irritable when asked for elaboration. She refused further interview and would not participate in discussion re: psychiatric admission.    Contacted pt's IMT, [facility], and spoke with Traci Conteh [facility] ([phone]) who reports pt with many recent hospitalizations last [facility] in February where she received Invega Sustenna on 2/11, also seen at [facility] ED on 3/2-3/3 after which time pt has been missing from IMT, suspected to be using cocaine as she tends to disappear and use substances at the beginning of the month after receiving public assistance check. No known other substance use. IMT in favor of psychiatric admission given severity of psychotic d/o and substance abuse, with h/o suicide attempt (though no known recent attempts)."    On interview here [sic - from assessment on 3/9/21], patient found resting in bed, considerably somnolent. Per nursing staff, patient received IM Zyprexa prior to EMS transport from [facility] ED for agitation and refusal to enter ambulance. Patient answers some questions briefly with interviewer, after a few minutes returning to sleep and refusing to answer further questions. Patient vaguely reaffirms the above, stating "I was feeling a little suicidal, that's all". Does not elaborate when prompted re:plan, intent. Denies acute precipitants. States now she feels improved and adamantly denies SI/I/P, SIB urges. Additionally denies recent substance use even when UDS results were brought up. Patient asks for food then returns closes eyes and refuses to answer further questions.    PMH:  AMILCAR secondary to patient somnolence/limited cooperation    Meds:  AMILCAR secondary to patient somnolence/limited cooperation    Allergies:  AMILCAR secondary to patient somnolence/limited cooperation    Substance use:  UDS (+) cocaine, SDS negative including BAL <5  Hx polysubstance use (cocaine, alcohol) per chart review    Social:  Currently living "on the streets"; per ED note, recently living with mother. Patient unemployed"
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
## 2021-03-10 NOTE — BH INPATIENT PSYCHIATRY ASSESSMENT NOTE - OTHER
pt refused to answer assessment questions pt refused to answer assessment questions  in ED pt reported SI, but denies SIIP today  thin

## 2021-03-10 NOTE — BH INPATIENT PSYCHIATRY ASSESSMENT NOTE - NSBHCHARTREVIEWVS_PSY_A_CORE FT
Vital Signs Last 24 Hrs  T(C): 36.4 (10 Mar 2021 06:52), Max: 37.3 (09 Mar 2021 14:21)  T(F): 97.6 (10 Mar 2021 06:52), Max: 99.2 (09 Mar 2021 14:21)  HR: 71 (10 Mar 2021 04:53) (68 - 78)  BP: 98/63 (10 Mar 2021 04:53) (98/63 - 120/80)  BP(mean): --  RR: 18 (10 Mar 2021 04:53) (16 - 18)  SpO2: 98% (10 Mar 2021 04:53) (97% - 100%)

## 2021-03-10 NOTE — BH INPATIENT PSYCHIATRY ASSESSMENT NOTE - NSTXDEPRESINTERMD_PSY_ALL_CORE
invega sustenna 234 mg IM Q 4 weeks last given on 2/12/21 per IMT team, will give next dose 3/11/21

## 2021-03-10 NOTE — PSYCHIATRIC REHAB INITIAL EVALUATION - NSBHALCSUBTREAT_PSY_ALL_CORE
Patient was enrolled in treatment. However, per chart patient has been missing from [facility], and it is suspected that patient is using cocaine as patient tends to disappear and use substances after receiving public assistance checks.

## 2021-03-10 NOTE — BH INPATIENT PSYCHIATRY ASSESSMENT NOTE - CURRENT MEDICATION
MEDICATIONS  (STANDING):  risperiDONE  Disintegrating Tablet 1 milliGRAM(s) Oral at bedtime    MEDICATIONS  (PRN):  diphenhydrAMINE 50 milliGRAM(s) Oral every 6 hours PRN agitation  diphenhydrAMINE   Injectable 50 milliGRAM(s) IntraMuscular once PRN agitation  fluPHENAZine 5 milliGRAM(s) Oral every 6 hours PRN agitation  fluPHENAZine  Injectable 5 milliGRAM(s) IntraMuscular once PRN agitation  hydrOXYzine hydrochloride 50 milliGRAM(s) Oral every 6 hours PRN anxiety  LORazepam   Injectable 2 milliGRAM(s) IntraMuscular once PRN agitation

## 2021-03-10 NOTE — BH INPATIENT PSYCHIATRY ASSESSMENT NOTE - NSBHASSESSSUMMFT_PSY_ALL_CORE
Pt is a 41 yo AA woman, was domiciled with mother, now homeless, unemployed, per chart and psyckes hx of Substance Induced Mood Disorder vs Schizophrenia/SAD, polysubstance use (alcohol, cocaine), multiple prior hospitalizations (last February 2021 to [facility], Jan 2021 at [facility]),  hx of invega sustenna last administered 2/11/2021, per chart previous suicide attempts, with remote hx of seizure disorder per chart, who walked into the ED with complaints of depression and suicidal thoughts.  Pt seen with SW and RN present. Pt was uncooperative with assessment, keeping her eyes closed and becoming irritable. Pt declined to answer assessment questions. When asked about her haldol allergy, but refused to elaborate only repeating "bad reaction" a dozen times. Pt asked about her reports in the ED that she was suicidal and pt denies this now. Pt then noticed that she had breakfast on her table, got up immediately and began eating, dismissing the treatment team. Pt's IMT team called (Traci [phone], Javi, and Angeles [phone]); Angeles confirmed that the pt last received invega sustaenna 234 mg IM Q 4 weeks on 2/12/21 at [facility] and is due in the next two days. Pt was most recently in [facility] discharged on 1/11/21 on seroquel that the pt was non-compliant with, then [facility] from 2/3-2/17/21 where she was discharged on the invega sustenna 234 mg IM Q 4 weeks, and was last at the [facility] ED on 3/8/21 where she was given no medications. Per IMT team, pt is chronically non-compliant with medications and frequently uses cocaine then goes to an ED reporting SI. Unclear if substance induced SI or if secondary gain as pt is homeless. Per IMT team, pt's mother will no longer allow the pt into her home. IMT team has a hard time tracking the pt in the community and it is unclear what her allergic reaction to haldol may be. IMT team denies any knowledge of PMH.    Pt is a 39 yo AA woman, was domiciled with mother, now homeless, unemployed, per chart and psyckes hx of Substance Induced Mood Disorder vs Schizophrenia/SAD, polysubstance use (alcohol, cocaine), multiple prior hospitalizations (last February 2021 to [facility], Jan 2021 at [facility]),  hx of invega sustenna last administered 2/11/2021, per chart previous suicide attempts, with remote hx of seizure disorder per chart, who walked into the ED with complaints of depression and suicidal thoughts.  Pt seen with SW and RN present. Pt was uncooperative with assessment, keeping her eyes closed and becoming irritable. Pt declined to answer assessment questions. When asked about her haldol allergy, but refused to elaborate only repeating "bad reaction" a dozen times. Pt asked about her reports in the ED that she was suicidal and pt denies this now. Pt then noticed that she had breakfast on her table, got up immediately and began eating, dismissing the treatment team. Pt's IMT team called (Traci [phone], Javi, and Angeles [phone]); Angeles confirmed that the pt last received invega sustaenna 234 mg IM Q 4 weeks on 2/12/21 at [facility] and is due in the next two days. Pt was most recently in [facility] discharged on 1/11/21 on seroquel that the pt was non-compliant with, then [facility] from 2/3-2/17/21 where she was discharged on the invega sustenna 234 mg IM Q 4 weeks, and was last at the [facility] ED on 3/8/21 where she was given no medications. Per IMT team, pt is chronically non-compliant with medications and frequently uses cocaine then goes to an ED reporting SI. Unclear if substance induced SI or if secondary gain as pt is homeless. Per IMT team, pt's mother will no longer allow the pt into her home. IMT team has a hard time tracking the pt in the community and it is unclear what her allergic reaction to haldol may be. IMT team denies any knowledge of PMH.     Plan:   -invega sustenna 234 mg IM Q 4 weeks ordered for 3/11/21  -PO/IM PRNs changed to prolixin/ativan/benadryl/atarax  -encourage allowing asssessments  -continue to monitor

## 2021-03-10 NOTE — BH INPATIENT PSYCHIATRY ASSESSMENT NOTE - NSBHCONSBHPROVDETAILS_PSY_A_CORE  FT
Spoke with pt's IMT team (Javi and Angeles) and they provided pt's medication list and outpt follow up progression

## 2021-03-10 NOTE — BH PATIENT PROFILE - HOME MEDICATIONS
nicotine , 1 gum orally every 2 hours, As Needed -smoking cessation Continue to take as prescribed until told otherwise by your provider  melatonin 3 mg oral tablet , 2 tab(s) orally once a day (at bedtime) x 7 days Continue to take as prescribed until told otherwise by your provider  QUEtiapine 100 mg oral tablet , 1 tab(s) orally once a day x 7 days Continue to take as prescribed until told otherwise by your provider  QUEtiapine 200 mg oral tablet , 1 tab(s) orally once a day (at bedtime) x 7 days Continue to take as prescribed until told otherwise by your provider  Christopher Sustenna 234 mg/1.5 mL intramuscular suspension, extended release , 234 milligram(s) intramuscular every 4 weeks Continue to take as prescribed until told otherwise by your provider  Last dose given: 1/14/2021  Next dose due: 2/11/2021

## 2021-03-10 NOTE — CHART NOTE - NSCHARTNOTEFT_GEN_A_CORE
Screening Medical Evaluation  Patient Admitted from: [facility] admitting diagnosis: Schizophrenia    PAST MEDICAL & SURGICAL HISTORY:  Schizophrenia          Allergies    Haldol (Unknown)    Intolerances        Social History:     FAMILY HISTORY:      MEDICATIONS  (STANDING):  melatonin. 3 milliGRAM(s) Oral at bedtime  risperiDONE  Disintegrating Tablet 1 milliGRAM(s) Oral at bedtime    MEDICATIONS  (PRN):  diphenhydrAMINE 50 milliGRAM(s) Oral every 6 hours PRN agitation  diphenhydrAMINE   Injectable 50 milliGRAM(s) IntraMuscular once PRN agitation  fluPHENAZine 5 milliGRAM(s) Oral every 6 hours PRN agitation  fluPHENAZine  Injectable 5 milliGRAM(s) IntraMuscular once PRN agitation  hydrOXYzine hydrochloride 50 milliGRAM(s) Oral every 6 hours PRN anxiety  LORazepam   Injectable 2 milliGRAM(s) IntraMuscular once PRN agitation      Vital Signs Last 24 Hrs  T(C): 36.4 (10 Mar 2021 19:45), Max: 36.9 (10 Mar 2021 04:53)  T(F): 97.5 (10 Mar 2021 19:45), Max: 98.5 (10 Mar 2021 04:53)  HR: 71 (10 Mar 2021 04:53) (68 - 78)  BP: 98/63 (10 Mar 2021 04:53) (98/63 - 120/80)  BP(mean): --  RR: 18 (10 Mar 2021 04:53) (16 - 18)  SpO2: 98% (10 Mar 2021 04:53) (98% - 100%)  CAPILLARY BLOOD GLUCOSE            PHYSICAL EXAM:  GENERAL: NAD, well-developed  HEAD:  Atraumatic, Normocephalic  EYES: EOMI, PERRLA, conjunctiva and sclera clear  NECK: Supple, No JVD  CHEST/LUNG: Clear to auscultation bilaterally; No wheeze  HEART: Regular rate and rhythm; No murmurs, rubs, or gallops  ABDOMEN: Soft, Nontender, Nondistended; Bowel sounds present  EXTREMITIES:  2+ Peripheral Pulses, No clubbing, cyanosis, or edema  PSYCH: AAOx3  NEUROLOGY: non-focal  SKIN: No rashes or lesions    LABS:                        11.1   4.34  )-----------( 276      ( 09 Mar 2021 10:10 )             33.8     03-09    140  |  104  |  10  ----------------------------<  137<H>  4.0   |  30  |  1.09    Ca    9.4      09 Mar 2021 10:10                RADIOLOGY & ADDITIONAL TESTS:    Assessment and Plan: 41 yo F with no significant PMH is admitted to [facility] with a primary psychiatric diagnosis of Schizophrenia. The pt currently denies having any medical complaints such as chest pain, sob, abdominal pain, n/v/d/c, or any problems with urination or bowel movements. The rest of her screening physical is unremarkable.    1.Schizophrenia-Plan: continue with meds as per primary psychiatric team

## 2021-03-10 NOTE — BH INPATIENT PSYCHIATRY ASSESSMENT NOTE - ADDITIONAL DETAILS ALL
per chart, remote h/o SA without mention on what the pt did     pt came into ED reporting SI without plan     today pt denies SIIP

## 2021-03-10 NOTE — BH INPATIENT PSYCHIATRY ASSESSMENT NOTE - NSBHMETABOLIC_PSY_ALL_CORE_FT
BMI: BMI (kg/m2): 19.9 (03-09-21 @ 09:02)  HbA1c: A1C with Estimated Average Glucose Result: 5.4 % (01-16-21 @ 07:55)    Glucose:   BP: --  Lipid Panel: Date/Time: 01-16-21 @ 07:55  Cholesterol, Serum: 195  Direct LDL: --  HDL Cholesterol, Serum: 75  Total Cholesterol/HDL Ration Measurement: --  Triglycerides, Serum: 78

## 2021-03-10 NOTE — BH PATIENT PROFILE - NSASFUNCLEVELADLTRANSFER_GEN_A_NUR
FUTURE VISIT INFORMATION      FUTURE VISIT INFORMATION:    Date: 12.28.19    Time: 8:00 AM    Location: [facility]  REFERRAL INFORMATION:    Referring provider:  Crystal    Referring providers clinic:  eye    Reason for visit/diagnosis  Both eyes infected, both itchy, inflamed, white matter    RECORDS REQUESTED FROM:       Clinic name Comments Records Status Imaging Status   [facility] 12.12.19 OV  [id number] EPIC                                         0 = independent

## 2021-03-10 NOTE — PSYCHIATRIC REHAB INITIAL EVALUATION - NSBHPRRECOMMEND_PSY_ALL_CORE
Writer attempted to meet with patient in order to orient patient to unit, and introduce patient to psychiatric staff and department functions. However, writer is completing this assessment from chart due to patient being asleep and non-responsive to writer’s attempt to engage. Writer selected an appropriate psychiatric rehabilitation goal. Psychiatric rehabilitation staff will continue to engage patient daily in order to develop therapeutic rapport. In response to COVID19, unit programming will be re-evaluated on a consistent basis in effort to maintain safety guidelines.

## 2021-03-10 NOTE — BH INPATIENT PSYCHIATRY ASSESSMENT NOTE - NSBHSACOC_PSY_A_CORE FT
unknown, pt refused to answer assessment questions   per chart and pt's outpt team, pt frequently uses cocaine

## 2021-03-10 NOTE — BH INPATIENT PSYCHIATRY ASSESSMENT NOTE - DETAILS
per chart, remote h/o SA without mention on what the pt did     pt came into ED reporting SI without plan     today pt denies SIIP  per pt: haldol gives her a "bad reaction" pt refused to elaborate

## 2021-03-10 NOTE — BH INPATIENT PSYCHIATRY ASSESSMENT NOTE - RISK ASSESSMENT
non-modifiable risk factors: chronic mental illness, history of self harm    modifiable risk factors: substance abuse, medication non-compliance, unstable housing     protective factors: pt's mother, pt has IMT team for outpt follow up, on NAGY    pt at elevated risk for violence to self or others as evidenced by above. These can be mitigated by medication compliance (NAGY), substance abuse counseling, continuing IMT outpt care, pyshoed

## 2021-03-10 NOTE — BH PATIENT PROFILE - STATED REASON FOR ADMISSION
"I am feeling suicidal and want to be admit"  40 year old female with hx of schizophrenia/SAD, Polysubstance abuse( alcohol and cocaine),walked into the ED with c/o depression and suicidal thoughts.

## 2021-03-11 PROCEDURE — 99232 SBSQ HOSP IP/OBS MODERATE 35: CPT

## 2021-03-11 RX ADMIN — Medication 50 MILLIGRAM(S): at 23:14

## 2021-03-11 RX ADMIN — PALIPERIDONE 234 MILLIGRAM(S): 1.5 TABLET, EXTENDED RELEASE ORAL at 16:01

## 2021-03-11 RX ADMIN — Medication 3 MILLIGRAM(S): at 20:57

## 2021-03-11 RX ADMIN — Medication 50 MILLIGRAM(S): at 23:13

## 2021-03-11 RX ADMIN — RISPERIDONE 1 MILLIGRAM(S): 4 TABLET ORAL at 20:58

## 2021-03-11 NOTE — BH INPATIENT PSYCHIATRY PROGRESS NOTE - NSBHFUPINTERVALHXFT_PSY_A_CORE
Discharge instructions and prescriptions reviewed with patient as well as follow up with her doctor in [place]. Questions answered. Advised to call when  returned.    Patient seen for follow up for SI and substance abuse, chart reviewed, and case discussed with treatment team.  Overnight, staff reported she refused risperdal and insisted on one time dose of seroquel instead.  Pt is due for her invega sustenna 234 mg IM Q 4 weeks injection today, but thus far the pt has refused to get out of bed for this. On approach, pt in bed and was seen responding to this writer's greeting, but refused to open her eyes or respond to assessment questions. Pt did not verbalize any A/VH, SIIP, HIIP, or paranoia. Pt appears to be coming down off substances of abuse, but has refused to provide urine samples or blood work ordered. Pt noted to have an empty breakfast tray on her desk. Will continue to monitor and attempt to engage pt. Encourage NAGY compliance.

## 2021-03-11 NOTE — BH INPATIENT PSYCHIATRY PROGRESS NOTE - OTHER
pt refused to answer assessment questions  in ED pt reported SI, but denies SIIP on unit - pt refused to answer questions today pt refused to answer assessment questions thin

## 2021-03-12 PROCEDURE — 99232 SBSQ HOSP IP/OBS MODERATE 35: CPT

## 2021-03-12 RX ORDER — HYDROXYZINE HCL 10 MG
50 TABLET ORAL ONCE
Refills: 0 | Status: COMPLETED | OUTPATIENT
Start: 2021-03-12 | End: 2021-03-12

## 2021-03-12 RX ORDER — LANOLIN ALCOHOL/MO/W.PET/CERES
6 CREAM (GRAM) TOPICAL AT BEDTIME
Refills: 0 | Status: DISCONTINUED | OUTPATIENT
Start: 2021-03-12 | End: 2021-03-22

## 2021-03-12 RX ADMIN — FLUPHENAZINE HYDROCHLORIDE 5 MILLIGRAM(S): 1 TABLET, FILM COATED ORAL at 01:11

## 2021-03-12 RX ADMIN — Medication 6 MILLIGRAM(S): at 20:52

## 2021-03-12 NOTE — BH INPATIENT PSYCHIATRY PROGRESS NOTE - NSBHFUPINTERVALHXFT_PSY_A_CORE
Patient seen for follow up for SI and substance abuse, chart reviewed, and case discussed with treatment team.  Overnight, staff reported she was up most of the night asking for food, irritable, requiring PO PRNs at 23:13.  Pt took invega sustenna 234 mg IM Q 4 weeks injection yesterday and denies any SE from this. PO risperdal discontinued. On approach, pt in bed and was not verbal, only nodding yes or no or shrugging her shoulders in response to assessment questions. Pt did not verbalize any A/VH, SIIP, HIIP, or paranoia. Pt appears to be coming down off substances of abuse, but has refused to provide urine samples or blood work ordered - labs reordered for 3/15/21. Will continue to monitor and attempt to engage pt. Pt's IMT team Angeles left a message [phone]; called back, left message, awaiting call back.

## 2021-03-12 NOTE — BH INPATIENT PSYCHIATRY PROGRESS NOTE - OTHER
pt refused to answer assessment questions pt refused to answer assessment questions  in ED pt reported SI, but denies SIIP on unit - pt refused to answer questions today thin

## 2021-03-13 PROCEDURE — 99232 SBSQ HOSP IP/OBS MODERATE 35: CPT

## 2021-03-13 RX ORDER — NICOTINE POLACRILEX 2 MG
2 GUM BUCCAL
Refills: 0 | Status: DISCONTINUED | OUTPATIENT
Start: 2021-03-13 | End: 2021-03-22

## 2021-03-13 RX ADMIN — Medication 6 MILLIGRAM(S): at 20:53

## 2021-03-13 RX ADMIN — Medication 50 MILLIGRAM(S): at 14:48

## 2021-03-13 RX ADMIN — Medication 2 MILLIGRAM(S): at 16:39

## 2021-03-13 NOTE — BH INPATIENT PSYCHIATRY PROGRESS NOTE - OTHER
pt refused to answer assessment questions thin pt refused to answer assessment questions  in ED pt reported SI, but denies SIIP on unit - pt refused to answer questions today

## 2021-03-13 NOTE — BH INPATIENT PSYCHIATRY PROGRESS NOTE - NSBHFUPINTERVALHXFT_PSY_A_CORE
Patient seen for follow up for SI and substance abuse, chart reviewed, and case discussed with nursing staff.  Patient is observed in her room, minimally engaging in interview.  Minimal verbal responses to interview questions, only nodding yes or no or shrugging her shoulders.  Denies SI/HI/AVH, faraz, delusions or paranoia.  Pt continues to refuse to provide urine samples or blood work ordered - labs reordered for 3/15/21. Will continue to monitor and attempt to engage pt.

## 2021-03-14 PROCEDURE — 99232 SBSQ HOSP IP/OBS MODERATE 35: CPT

## 2021-03-14 RX ADMIN — Medication 2 MILLIGRAM(S): at 15:47

## 2021-03-14 RX ADMIN — Medication 50 MILLIGRAM(S): at 15:48

## 2021-03-14 RX ADMIN — Medication 50 MILLIGRAM(S): at 21:55

## 2021-03-14 RX ADMIN — Medication 6 MILLIGRAM(S): at 20:24

## 2021-03-14 RX ADMIN — Medication 2 MILLIGRAM(S): at 20:16

## 2021-03-14 NOTE — BH INPATIENT PSYCHIATRY PROGRESS NOTE - NSBHFUPINTERVALHXFT_PSY_A_CORE
Patient seen for follow up for SI and substance abuse, chart reviewed, and case discussed with nursing staff.  No interval events. Per nursing patient was very vocal yesterday evening.  Patient went to nursing station requesting food, and po prn.  Given Atarax. Per nursing patient also requested Seroquel and melatonin, pt reported to nursing “its for my concentration.”  Today patient is seen in her room.  Minimal verbal responses to interview questions, only nodding yes or no or shrugging her shoulders.  Denies SI/HI/AVH, faraz, delusions or paranoia.  Pt continues to refuse to provide urine samples or blood work ordered - labs reordered for 3/15/21. Will continue to monitor and attempt to engage pt.

## 2021-03-15 PROCEDURE — 99232 SBSQ HOSP IP/OBS MODERATE 35: CPT

## 2021-03-15 RX ADMIN — Medication 50 MILLIGRAM(S): at 20:42

## 2021-03-15 RX ADMIN — Medication 50 MILLIGRAM(S): at 20:43

## 2021-03-15 RX ADMIN — Medication 6 MILLIGRAM(S): at 20:38

## 2021-03-15 NOTE — BH INPATIENT PSYCHIATRY PROGRESS NOTE - NSBHFUPINTERVALHXFT_PSY_A_CORE
Patient seen for follow up for SI and substance abuse, chart reviewed, and case discussed with treatment team.  Overnight, staff reported she was up at 4AM asking for food, irritable, and demanding seroquel for sleep. Pt noted to sleep throughout the day and sleep hygiene was attempted, but pt was not receptive to teaching.  Pt took invega sustenna 234 mg IM Q 4 weeks 3/11/21 and denies any SE. Melatonin increased to 6 mg PO QHS to address insomnia concerns. On approach, pt in bed , refusing blood work, "I'm here for psychiatric, not medical. I don't need blood work." Attempted to provide education but pt was not receptive to this or any further assessment. Pt extremely irritable and uncooperative. Pt did not verbalize any A/VH, SIIP, HIIP, or paranoia. Pt appears to be coming down off substances of abuse, but has refused to provide urine samples or blood work ordered. Will continue to monitor and attempt to engage pt. Projected discharge this week, awaiting shelter application.

## 2021-03-16 PROCEDURE — 99232 SBSQ HOSP IP/OBS MODERATE 35: CPT

## 2021-03-16 RX ORDER — ACETAMINOPHEN 500 MG
650 TABLET ORAL ONCE
Refills: 0 | Status: COMPLETED | OUTPATIENT
Start: 2021-03-16 | End: 2021-03-16

## 2021-03-16 RX ADMIN — Medication 650 MILLIGRAM(S): at 19:58

## 2021-03-16 RX ADMIN — Medication 6 MILLIGRAM(S): at 20:06

## 2021-03-16 RX ADMIN — Medication 650 MILLIGRAM(S): at 20:59

## 2021-03-16 RX ADMIN — Medication 2 MILLIGRAM(S): at 13:39

## 2021-03-16 RX ADMIN — Medication 50 MILLIGRAM(S): at 13:50

## 2021-03-16 RX ADMIN — Medication 50 MILLIGRAM(S): at 20:05

## 2021-03-16 NOTE — BH INPATIENT PSYCHIATRY PROGRESS NOTE - NSBHFUPINTERVALHXFT_PSY_A_CORE
Patient seen for follow up for SI and substance abuse, chart reviewed, and case discussed with treatment team.  Overnight, staff reported she received benadryl and atarax PO PRNs for sleep and slept well afterwards. Pt noted to sleep throughout the day and sleep hygiene was attempted, but pt was not receptive to teaching. Pt took invega sustenna 234 mg IM Q 4 weeks 3/11/21 and denies any SE. On approach, pt in bed, irritable, refusing blood work and VS and requesting discharge. Pt denies having a IMT team today and states that she gets her monthly Rachel "at the hospital" but unable to say which hospital she gets this from. Pt very superficial and smiling when asking about discharge and when informed that the team would like to place a shelter application, pt stated that she lives at "[address] [facility]" in [place], but later found this writer on the unit and corrected herself, "I gave you my old address, I live at [address] [place] [address]" in [place]. This was looked up and is the address for a [facility]. Pt did not verbalize any A/VH, SIIP, HIIP, or paranoia. Pt appears to be coming down off substances of abuse, but has refused to provide urine samples or blood work ordered. Will continue to monitor and attempt to engage pt. Projected discharge this week, awaiting shelter application.

## 2021-03-16 NOTE — BH INPATIENT PSYCHIATRY DISCHARGE NOTE - HOSPITAL COURSE
Pt seen by this writer on 3/10/21: "Pt is a 41 yo AA woman, was domiciled with mother, now homeless, unemployed, per chart and psyckes hx of Substance Induced Mood Disorder vs Schizophrenia/SAD, polysubstance use (alcohol, cocaine), multiple prior hospitalizations (last February 2021 to [facility], Jan 2021 at [facility]),  hx of invega sustenna last administered 2/11/2021, per chart previous suicide attempts, with remote hx of seizure disorder per chart, who walked into the ED with complaints of depression and suicidal thoughts.  Pt seen with SW and RN present. Pt was uncooperative with assessment, keeping her eyes closed and becoming irritable. Pt declined to answer assessment questions. When asked about her haldol allergy, but refused to elaborate only repeating "bad reaction" a dozen times. Pt asked about her reports in the ED that she was suicidal and pt denies this now. Pt then noticed that she had breakfast on her table, got up immediately and began eating, dismissing the treatment team. Pt's IMT team called (Traci [phone], Javi, and Angeles [phone]); Angeles confirmed that the pt last received invega sustaenna 234 mg IM Q 4 weeks on 2/12/21 at [facility] and is due in the next two days. Pt was most recently in [facility] discharged on 1/11/21 on seroquel that the pt was non-compliant with, then SO from 2/3-2/17/21 where she was discharged on the invega sustenna 234 mg IM Q 4 weeks, and was last at the [facility] ED on 3/8/21 where she was given no medications. Per IMT team, pt is chronically non-compliant with medications and frequently uses cocaine then goes to an ED reporting SI. Unclear if substance induced SI or if secondary gain as pt is homeless. Per IMT team, pt's mother will no longer allow the pt into her home. IMT team has a hard time tracking the pt in the community and it is unclear what her allergic reaction to haldol may be. IMT team denies any knowledge of PMH.     Plan:   -invega sustenna 234 mg IM Q 4 weeks ordered for 3/11/21  -PO/IM PRNs changed to prolixin/ativan/benadryl/atarax  -encourage allowing asssessments  -continue to monitor"    During hospitalization the pt was irritable and uncooperative with assessments, nodding or shaking her head and shrugging her shoulders in answer to questions. Pt refused to provide urine samples, refused blood work, and VS despite encouragement and education provided stating she was admitted for psychiatric reasons and not medical. Suspect pt was coming down off substances of abuse given pt's history of presenting similarly in the past. Pt observed to isolate to her room, eat meals, and get back into bed, selectively mute during assessments for several days. Pt was given her invega sustenna 234 mg IM Q 4 weeks on 3/11/21 without incident and pt denied SE. Staff observed that pt then began to be more visible at night, demanding food, and PO PRNs for sleep despite sleeping during the day. Pt uncooperative with discharge planning, giving the address to a [facility] as her home address. Pt gradually became less irritable after NAGY was administered. Pt began denying SIIP, HIIP, A/VH, or paranoia sxs and began requesting discharge. Pt provided with medication education including, but not limited to, possible side effects like sedation, dizziness, change in liver function levels, weight gain, N/V, GI upset, EPS, TD, NMS, and metabolic changes; pt verbalized understanding. Pt instructed not to drive or use hazardous machinery or materials while on this medication; pt verbalized understanding. On day of discharge, pt denied SIIP, HIIP, A/VH, IOR, paranoia, thought insertion/withdrawal/broadcasting, magical thinking, special abilities, mind reading, Hoahaoism preoccupation, sxs of faraz, depression, or anxiety. Pt educated on use of 911 in cases of emergency and to go to the nearest ED if feeling unsafe in the community. Pt verbalized understanding. Pt provided with numbers for Suicide Prevention and [facility] Well and educated on their use; pt verbalized understanding. Pt was educated on the adverse effects these medications may have on a fetus and provided with birth control education; pt verbalized understanding    Pt discharged on: invega sustenna 234 mg IM Q 4 weeks (last on 3/11/21) and melatonin 6 mg PO QHS Pt seen by this writer on 3/10/21: "Pt is a 41 yo AA woman, was domiciled with mother, now homeless, unemployed, per chart and psyckes hx of Substance Induced Mood Disorder vs Schizophrenia/SAD, polysubstance use (alcohol, cocaine), multiple prior hospitalizations (last February 2021 to [facility], Jan 2021 at [facility]),  hx of invega sustenna last administered 2/11/2021, per chart previous suicide attempts, with remote hx of seizure disorder per chart, who walked into the ED with complaints of depression and suicidal thoughts.  Pt seen with SW and RN present. Pt was uncooperative with assessment, keeping her eyes closed and becoming irritable. Pt declined to answer assessment questions. When asked about her haldol allergy, but refused to elaborate only repeating "bad reaction" a dozen times. Pt asked about her reports in the ED that she was suicidal and pt denies this now. Pt then noticed that she had breakfast on her table, got up immediately and began eating, dismissing the treatment team. Pt's IMT team called (Traci [phone], Javi, and Angeles [phone]); Angeles confirmed that the pt last received invega sustaenna 234 mg IM Q 4 weeks on 2/12/21 at [facility] and is due in the next two days. Pt was most recently in [facility] discharged on 1/11/21 on seroquel that the pt was non-compliant with, then SO from 2/3-2/17/21 where she was discharged on the invega sustenna 234 mg IM Q 4 weeks, and was last at the [facility] ED on 3/8/21 where she was given no medications. Per IMT team, pt is chronically non-compliant with medications and frequently uses cocaine then goes to an ED reporting SI. Unclear if substance induced SI or if secondary gain as pt is homeless. Per IMT team, pt's mother will no longer allow the pt into her home. IMT team has a hard time tracking the pt in the community and it is unclear what her allergic reaction to haldol may be. IMT team denies any knowledge of PMH.     Plan:   -invega sustenna 234 mg IM Q 4 weeks ordered for 3/11/21  -PO/IM PRNs changed to prolixin/ativan/benadryl/atarax  -encourage allowing asssessments  -continue to monitor"    During hospitalization the pt was irritable and uncooperative with assessments, nodding or shaking her head and shrugging her shoulders in answer to questions. Pt refused to provide urine samples, refused blood work, and VS despite encouragement and education provided stating she was admitted for psychiatric reasons and not medical. Suspect pt was coming down off substances of abuse given pt's history of presenting similarly in the past. Pt observed to isolate to her room, eat meals, and get back into bed, selectively mute during assessments for several days. Pt was given her invega sustenna 234 mg IM Q 4 weeks on 3/11/21 without incident and pt denied SE. Staff observed that pt then began to be more visible at night, demanding food, and PO PRNs for sleep despite sleeping during the day. Pt uncooperative with discharge planning, giving the address to a [facility] as her home address. Pt gradually became less irritable after NAGY was administered. Pt began denying SIIP, HIIP, A/VH, or paranoia sxs and began requesting discharge. The day prior to discharge, pt stated that she saw a Dr. Craig at the [facility]. Dr. Craig immediately contacted and IMT then reported pt has an AOT order and requested that the IMT team  pt at discharge. Pt's AOT status was not known until just prior to discharge. Pt provided with medication education including, but not limited to, possible side effects like sedation, dizziness, change in liver function levels, weight gain, N/V, GI upset, EPS, TD, NMS, and metabolic changes; pt verbalized understanding. Pt instructed not to drive or use hazardous machinery or materials while on this medication; pt verbalized understanding. On day of discharge, pt denied SIIP, HIIP, A/VH, IOR, paranoia, thought insertion/withdrawal/broadcasting, magical thinking, special abilities, mind reading, Restoration preoccupation, sxs of faraz, depression, or anxiety. Pt educated on use of 911 in cases of emergency and to go to the nearest ED if feeling unsafe in the community. Pt verbalized understanding. Pt provided with numbers for Suicide Prevention and [facility] Well and educated on their use; pt verbalized understanding. Pt was educated on the adverse effects these medications may have on a fetus and provided with birth control education; pt verbalized understanding    Pt discharged on: invega sustenna 234 mg IM Q 4 weeks (last on 3/11/21) and melatonin 6 mg PO QHS

## 2021-03-16 NOTE — BH INPATIENT PSYCHIATRY DISCHARGE NOTE - NSDCMRMEDTOKEN_GEN_ALL_CORE_FT
Christopher Sustenna 234 mg/1.5 mL intramuscular suspension, extended release: 234 milligram(s) intramuscular every 4 weeks Continue to take as prescribed until told otherwise by your provider  Last dose given: 1/14/2021  Next dose due: 2/11/2021  melatonin 3 mg oral tablet: 2 tab(s) orally once a day (at bedtime) x 7 days Continue to take as prescribed until told otherwise by your provider  nicotine: 1 gum orally every 2 hours, As Needed -smoking cessation Continue to take as prescribed until told otherwise by your provider  QUEtiapine 100 mg oral tablet: 1 tab(s) orally once a day x 7 days Continue to take as prescribed until told otherwise by your provider  QUEtiapine 200 mg oral tablet: 1 tab(s) orally once a day (at bedtime) x 7 days Continue to take as prescribed until told otherwise by your provider   Invega Sustenna 234 mg/1.5 mL intramuscular suspension, extended release: 234 milligram(s) intramuscular every 4 weeks Continue to take as prescribed until told otherwise by your provider  Last dose given: 3/11/2021    melatonin 3 mg oral tablet: 2 tab(s) orally once a day (at bedtime)

## 2021-03-16 NOTE — BH INPATIENT PSYCHIATRY DISCHARGE NOTE - NSBHDCHANDOFFFT_PSY_ALL_CORE
Handoff provided to pt's IMT team  Handoff provided to pt's IMT team (Traci) and message left with medication list for Dr. Craig along with this writer's cobtact # [phone] for any further questions or concerns

## 2021-03-16 NOTE — BH INPATIENT PSYCHIATRY DISCHARGE NOTE - NSBHMETABOLIC_PSY_ALL_CORE_FT
BMI: BMI (kg/m2): 19.9 (03-09-21 @ 09:02)  HbA1c: A1C with Estimated Average Glucose Result: 5.4 % (01-16-21 @ 07:55)    Glucose:   BP: 117/79 (03-15-21 @ 06:26) (117/79 - 117/79)  Lipid Panel: Date/Time: 01-16-21 @ 07:55  Cholesterol, Serum: 195  Direct LDL: --  HDL Cholesterol, Serum: 75  Total Cholesterol/HDL Ration Measurement: --  Triglycerides, Serum: 78

## 2021-03-16 NOTE — BH INPATIENT PSYCHIATRY DISCHARGE NOTE - HPI (INCLUDE ILLNESS QUALITY, SEVERITY, DURATION, TIMING, CONTEXT, MODIFYING FACTORS, ASSOCIATED SIGNS AND SYMPTOMS)
Per ED  assessment dated 3/9/21:  "41 yo woman, domiciled with mother, unemployed, per chart and psyckes hx of Substance Induced Mood Disorder vs Schizophrenia/SAD, polysubstance use (alcohol, cocaine), multiple prior hospitalizations (last February 2021 to [facility], Jan 2021 at [facility]),  hx of invega sustenna last administered 2/11/2021, per chart previous suicide attempts, with remote hx of seizure disorder per chart, who walked into the ED this morning with complaint of depression and suicidal thoughts.      On psychiatric evaluation, pt found lying on a stretcher with the sheets covering her entirely and sleeping. Pt is not cooperative with history with limited participation, drowsy and slurring her words.  Pt repeats that she is feeling suicidal and would like to be admitted.  She did not answer other questions including re: suicide plan or recent attempts. She stated "I already told you" to many questions that had not been answered. She was unable to state whether she had used any substances. She was unable to state whether she'd been experiencing hallucinations or other psychotic symptoms.  She did state that she "does not feel good" and became irritable when asked for elaboration. She refused further interview and would not participate in discussion re: psychiatric admission.    Contacted pt's IMT, [facility], and spoke with Traci Conteh [facility] ([phone]) who reports pt with many recent hospitalizations last [facility] in February where she received Invega Sustenna on 2/11, also seen at [facility] ED on 3/2-3/3 after which time pt has been missing from IMT, suspected to be using cocaine as she tends to disappear and use substances at the beginning of the month after receiving public assistance check. No known other substance use. IMT in favor of psychiatric admission given severity of psychotic d/o and substance abuse, with h/o suicide attempt (though no known recent attempts)."    On interview here [sic - from assessment on 3/9/21], patient found resting in bed, considerably somnolent. Per nursing staff, patient received IM Zyprexa prior to EMS transport from [facility] ED for agitation and refusal to enter ambulance. Patient answers some questions briefly with interviewer, after a few minutes returning to sleep and refusing to answer further questions. Patient vaguely reaffirms the above, stating "I was feeling a little suicidal, that's all". Does not elaborate when prompted re:plan, intent. Denies acute precipitants. States now she feels improved and adamantly denies SI/I/P, SIB urges. Additionally denies recent substance use even when UDS results were brought up. Patient asks for food then returns closes eyes and refuses to answer further questions.    PMH:  AMILCAR secondary to patient somnolence/limited cooperation    Meds:  AMILCAR secondary to patient somnolence/limited cooperation    Allergies:  AMILCAR secondary to patient somnolence/limited cooperation    Substance use:  UDS (+) cocaine, SDS negative including BAL <5  Hx polysubstance use (cocaine, alcohol) per chart review    Social:  Currently living "on the streets"; per ED note, recently living with mother. Patient unemployed"

## 2021-03-17 PROCEDURE — 99232 SBSQ HOSP IP/OBS MODERATE 35: CPT

## 2021-03-17 RX ORDER — LANOLIN ALCOHOL/MO/W.PET/CERES
2 CREAM (GRAM) TOPICAL
Qty: 28 | Refills: 0
Start: 2021-03-17 | End: 2021-03-30

## 2021-03-17 RX ORDER — PALIPERIDONE 1.5 MG/1
234 TABLET, EXTENDED RELEASE ORAL
Qty: 0 | Refills: 0 | DISCHARGE

## 2021-03-17 RX ORDER — PALIPERIDONE 1.5 MG/1
234 TABLET, EXTENDED RELEASE ORAL
Qty: 1 | Refills: 0
Start: 2021-03-17 | End: 2021-03-17

## 2021-03-17 RX ADMIN — Medication 50 MILLIGRAM(S): at 20:06

## 2021-03-17 RX ADMIN — Medication 2 MILLIGRAM(S): at 20:07

## 2021-03-17 RX ADMIN — Medication 50 MILLIGRAM(S): at 01:33

## 2021-03-17 RX ADMIN — Medication 6 MILLIGRAM(S): at 21:04

## 2021-03-17 NOTE — BH INPATIENT PSYCHIATRY PROGRESS NOTE - NSBHFUPINTERVALHXFT_PSY_A_CORE
Patient seen for follow up for SI and substance abuse, chart reviewed, and case discussed with treatment team.  Overnight, staff reported she received benadryl  PO PRNs for sleep and slept well afterwards. Pt noted to sleep throughout the day and sleep hygiene was attempted, but pt was not receptive to teaching. Pt took invega sustenna 234 mg IM Q 4 weeks 3/11/21 and denies any SE. On approach, pt in bed, irritable, now reporting a different address to discharge her home to. Pt continue to deny having a IMT team. Pt did not verbalize any A/VH, SIIP, HIIP, or paranoia. Pt appears to be coming down off substances of abuse, but has refused to provide urine samples or blood work ordered. Will continue to monitor and attempt to engage pt. Projected discharge this week, awaiting shelter application. Pt's IMT team called, SW attempted to call and left messages x 3 - awaiting call back

## 2021-03-18 PROCEDURE — 99232 SBSQ HOSP IP/OBS MODERATE 35: CPT

## 2021-03-18 RX ADMIN — Medication 6 MILLIGRAM(S): at 20:48

## 2021-03-18 NOTE — BH TREATMENT PLAN - NSTXDEPRESINTERMD_PSY_ALL_CORE
invega sustenna 234 mg IM Q 4 weeks last given on 2/12/21 per IMT team, will give next dose 3/11/21 
invega sustenna 234 mg IM Q 4 weeks last given on 2/12/21 per IMT team, will give next dose 3/11/21

## 2021-03-18 NOTE — BH TREATMENT PLAN - NSTXCOPEINTERPR_PSY_ALL_CORE
Patient’s psychiatric rehabilitation goal is to meet with staff individually and attend psychiatric rehabilitation groups, in order for patient to identify and utilize 2 effective coping skills for better symptom management and sustain recovery within 7 days.
Patient’s psychiatric rehabilitation goal is to meet with staff individually and attend psychiatric rehabilitation groups, in order for patient to identify and utilize 2 effective coping skills for better symptom management and sustain recovery within 7 days.

## 2021-03-18 NOTE — BH TREATMENT PLAN - NSTXSUBMISGOAL_PSY_ALL_CORE
Be able to acknowledge that substance abuse is a problem
Be able to acknowledge that substance abuse is a problem

## 2021-03-18 NOTE — BH TREATMENT PLAN - NSTXDCOPLKGOAL_PSY_ALL_CORE
Will agree to consider an appropriate level of outpatient care
Will agree to consider an appropriate level of outpatient care

## 2021-03-18 NOTE — BH TREATMENT PLAN - NSTXDEPRESGOAL_PSY_ALL_CORE
Report using a coping skill to overcome sadness and worry in order to socialize with peers daily
Report using a coping skill to overcome sadness and worry in order to socialize with peers daily

## 2021-03-18 NOTE — BH TREATMENT PLAN - NSTXDCOPLKINTERSW_PSY_ALL_CORE
Writer will continue to meet with pt daily and attempt to gather additional information that will enable treatment team to provide apprpriate treatment and proper discharge plan.
Writer will continue to meet with pt daily and attempt to gather additional information that will enable treatment team to provide apprpriate treatment and proper discharge plan.

## 2021-03-18 NOTE — BH TREATMENT PLAN - NSTXPSYCHOGOAL_PSY_ALL_CORE
Will identify 2 coping skills that help mitigate hallucinations
Will identify 2 coping skills that help mitigate hallucinations

## 2021-03-18 NOTE — BH TREATMENT PLAN - NSTXCAREGIVERAGREEMENT_PSY_P_CORE
Patient does not have family/caregiver involved in care
Patient does not have family/caregiver involved in care

## 2021-03-18 NOTE — BH TREATMENT PLAN - NSTXALCDRGINTERMD_PSY_ALL_CORE
monitor for s/s of withdrawal; substance abuse counseling, psychoed 
monitor for s/s of withdrawal; substance abuse counseling, psychoed

## 2021-03-18 NOTE — BH INPATIENT PSYCHIATRY PROGRESS NOTE - NSBHFUPINTERVALHXFT_PSY_A_CORE
Patient seen for follow up for SI and substance abuse, chart reviewed, and case discussed with treatment team.  Overnight, staff reported she received benadryl  PO PRNs for sleep and slept well afterwards. Pt took invega sustenna 234 mg IM Q 4 weeks 3/11/21 and denies any SE. On approach, pt in bed, irritable, reporting she gave only two addresses and one was her old address, when confronted that she also gave an address to a [facility] pharmacy pt. states she doesn't remember saying that. Pt continue to deny having a IMT team. Pt did not verbalize any A/VH, SIIP, HIIP, or paranoia. Will continue to monitor and attempt to engage pt. Projected discharge this week, awaiting shelter application. Pt's IMT team called, SW attempted to call and left messages - awaiting call back Patient seen for follow up for SI and substance abuse, chart reviewed, and case discussed with treatment team.  Overnight, staff reported she received atarax PO PRNs for sleep and slept well afterwards. Pt took invega sustenna 234 mg IM Q 4 weeks 3/11/21 and denies any SE. On approach, pt in bed, irritable, reporting she gave only two addresses and one was her old address, when confronted that she also gave an address to a [facility] pharmacy pt. states she doesn't remember saying that. Pt continue to deny having a IMT team. Pt did not verbalize any A/VH, SIIP, HIIP, or paranoia. Will continue to monitor and attempt to engage pt. Projected discharge this week, awaiting shelter application. Pt's IMT team called, SW attempted to call and left messages - awaiting call back

## 2021-03-19 PROCEDURE — 99232 SBSQ HOSP IP/OBS MODERATE 35: CPT

## 2021-03-19 RX ADMIN — Medication 6 MILLIGRAM(S): at 21:13

## 2021-03-19 RX ADMIN — Medication 2 MILLIGRAM(S): at 11:30

## 2021-03-19 NOTE — BH DISCHARGE NOTE NURSING/SOCIAL WORK/PSYCH REHAB - NSDCPRGOAL_PSY_ALL_CORE
Patient was hospitalized due to worsening depression and SI secondary to noncompliance with medication. On the unit, patient was cooperative, though minimally involved in unit activities. Patient appears to have impaired insight in regards to substance use. Patient was unable to identify effective coping skills to manage symptoms. Patient verbalized improvement in mood and denied SI/HI/AH/VH upon discharge. Patient is encouraged to continue to explore sobriety maintenance skills as well as healthy coping skills for relapse prevention and improved symptom management.  Patient was hospitalized due to worsening depression and SI secondary to noncompliance with medication. On the unit, patient was cooperative, though minimally involved in unit activities. Patient appears to have impaired insight in regards to substance use. Patient was unable to identify effective coping skills to manage symptoms. Patient verbalized improvement in mood and denied SI/HI/AH/VH upon discharge. Patient is encouraged to continue to explore sobriety maintenance skills as well as healthy coping skills for relapse prevention and improved symptom management.     Pt was originally going to be discharged last week, however course of discharge changed and pt was discharged on 3/22/21 (today). Writer met with pt and followed up about safety planning. Pt was able to identify additional coping skills such as "keeping my mind busy by watching TV" and "going for a walk" pt also shared that warning signs include "irritability, agitation, and restlessness" and in order to stop these feelings from occurring, she will "take medications" as prescribed and "it helps to not feel this way" and additionally she will "cope" and use coping skills mentioned above. Pt is bright upon discharge today and states she is feeling "good." SI/HI/I/P and AH/VH/TH denied by pt.

## 2021-03-19 NOTE — BH SAFETY PLAN - LOCAL URGENT CARE NAME
history    All communication needs, concerns and issues assessed and addressed with patient and parent    Adverse effects of 2nd hand smoking discussed with parents and importance of avoiding the cigarette smoke discussed with them        No change in [facility] since last visit      Family history    No change in [facility] since last visit        Health History     Allergies are reviewed, no change in since last visit            Vitals:    10/08/18 1320   Pulse: 123   Resp: 24   Temp: 97.3 °F (36.3 °C)   TempSrc: Temporal   Weight: (!) 23 lb 1 oz (10.5 kg)             Review of Systems   Constitutional: Positive for fatigue. Negative for appetite change, fever and irritability. HENT: Positive for congestion, postnasal drip and rhinorrhea. Negative for mouth sores. Eyes: Positive for discharge. Respiratory: Positive for cough and wheezing. Negative for shortness of breath. Cardiovascular: Negative for fatigue with feeds and sweating with feeds. Gastrointestinal: Positive for anorexia and diarrhea. Negative for constipation and vomiting. Skin: Negative for rash. Neurological: Negative for seizures. Objective:   Physical Exam   Constitutional: Vital signs are normal. He appears well-developed. He is active and playful. He has a strong cry. Non-toxic appearance. No distress. HENT:   Head: Anterior fontanelle is flat. No tenderness. No signs of injury. No swelling in the jaw. No pain on movement. Right Ear: Tympanic membrane, external ear and pinna normal. No drainage. Tympanic membrane is normal. No middle ear effusion. Left Ear: Tympanic membrane, external ear and pinna normal. No drainage. Tympanic membrane is normal.  No middle ear effusion. Nose: Nasal discharge and congestion present. No rhinorrhea. Mouth/Throat: Mucous membranes are moist. No oral lesions. No oropharyngeal exudate or pharynx erythema. No tonsillar exudate. Oropharynx is clear.        Eyes: EOM are normal. Right eye exhibits Urgent MD for more than 12 to 24 hours. Do not give your child over-the-counter antidiarrhea or upset-stomach medicines without talking to your doctor first.    [facility] not give bismuth (Pepto-Bismol) or other medicines that contain salicylates, a form of aspirin, or aspirin. Aspirin has been linked to Reye syndrome, a serious illness. Wash your hands after you change diapers and before you touch food. Have your child wash his or her hands after using the toilet and before eating. Make sure that your child rests. Keep your child at home as long as he or she has a fever.           Braulio Love MD

## 2021-03-19 NOTE — BH INPATIENT PSYCHIATRY PROGRESS NOTE - NSBHFUPINTERVALHXFT_PSY_A_CORE
Patient seen for follow up for SI and substance abuse, chart reviewed, and case discussed with treatment team.  Overnight, staff reported pt is very much focused on food and discharge. Pt took invega sustenna 234 mg IM Q 4 weeks 3/11/21 and denies any SE. On approach, pt in bed, easily irritated, now reporting she sees a Dr. Craig at the [facility] [phone]; appt given for 3/24/21 at 1 PM and handoff on pt's medications and hospital course provided on her voicemail. Pt continues to deny having a IMT team, but Traci from IMT team called this writer and SW [phone] and stated that the pt is on AOT (this was the first the inpt team knew about this). IMT requested that they  the pt for discharge on 3/22/21 as the pt will discharge, go to her mother's home who will call 911, and the pt will be hospitalized again. Pt was informed of this and was initially upset that she would be picked up by IMT team, but was able to be redirected. Pt did not verbalize any A/VH, SIIP, HIIP, or paranoia. Will continue to monitor and attempt to engage pt. Projected discharge next week to the care of her IMT team

## 2021-03-19 NOTE — BH DISCHARGE NOTE NURSING/SOCIAL WORK/PSYCH REHAB - PATIENT PORTAL LINK FT
You can access the FollowMyHealth Patient Portal offered by [facility] by registering at the following website: [email or web address]. By joining [employer]’s FollowMyHealth portal, you will also be able to view your health information using other applications (apps) compatible with our system.

## 2021-03-19 NOTE — BH DISCHARGE NOTE NURSING/SOCIAL WORK/PSYCH REHAB - NSDCPRRECOMMEND_PSY_ALL_CORE
Patient would benefit from attending outpatient treatment for ongoing medication management, support, and psychotherapy.

## 2021-03-19 NOTE — BH SAFETY PLAN - STEP 6 SAFE ENVIRONMENT
Having my own space Having my own space  "I feel safe in my environment."  "Creator caring for me."

## 2021-03-20 RX ADMIN — Medication 6 MILLIGRAM(S): at 21:37

## 2021-03-20 RX ADMIN — Medication 50 MILLIGRAM(S): at 23:33

## 2021-03-20 RX ADMIN — FLUPHENAZINE HYDROCHLORIDE 5 MILLIGRAM(S): 1 TABLET, FILM COATED ORAL at 09:05

## 2021-03-20 RX ADMIN — Medication 50 MILLIGRAM(S): at 09:05

## 2021-03-21 RX ADMIN — Medication 6 MILLIGRAM(S): at 22:15

## 2021-03-21 RX ADMIN — Medication 50 MILLIGRAM(S): at 23:26

## 2021-03-21 RX ADMIN — FLUPHENAZINE HYDROCHLORIDE 5 MILLIGRAM(S): 1 TABLET, FILM COATED ORAL at 23:26

## 2021-03-22 VITALS — TEMPERATURE: 97 F

## 2021-03-22 PROCEDURE — 99238 HOSP IP/OBS DSCHRG MGMT 30/<: CPT

## 2021-03-22 NOTE — BH INPATIENT PSYCHIATRY PROGRESS NOTE - NSTXALCDRGINTERMD_PSY_ALL_CORE
monitor for s/s of withdrawal; substance abuse counseling, psychoed 

## 2021-03-22 NOTE — BH INPATIENT PSYCHIATRY PROGRESS NOTE - NSBHMETABOLIC_PSY_ALL_CORE_FT
BMI: BMI (kg/m2): 19.9 (03-09-21 @ 09:02)  HbA1c: A1C with Estimated Average Glucose Result: 5.4 % (01-16-21 @ 07:55)    Glucose:   BP: --  Lipid Panel: Date/Time: 01-16-21 @ 07:55  Cholesterol, Serum: 195  Direct LDL: --  HDL Cholesterol, Serum: 75  Total Cholesterol/HDL Ration Measurement: --  Triglycerides, Serum: 78  
BMI: BMI (kg/m2): 19.9 (03-09-21 @ 09:02)  HbA1c: A1C with Estimated Average Glucose Result: 5.4 % (01-16-21 @ 07:55)    Glucose:   BP: 117/79 (03-15-21 @ 06:26) (117/79 - 117/79)  Lipid Panel: Date/Time: 01-16-21 @ 07:55  Cholesterol, Serum: 195  Direct LDL: --  HDL Cholesterol, Serum: 75  Total Cholesterol/HDL Ration Measurement: --  Triglycerides, Serum: 78  
BMI: BMI (kg/m2): 19.9 (03-09-21 @ 09:02)  HbA1c: A1C with Estimated Average Glucose Result: 5.4 % (01-16-21 @ 07:55)    Glucose:   BP: 97/63 (03-17-21 @ 08:34) (97/63 - 97/63)  Lipid Panel: Date/Time: 01-16-21 @ 07:55  Cholesterol, Serum: 195  Direct LDL: --  HDL Cholesterol, Serum: 75  Total Cholesterol/HDL Ration Measurement: --  Triglycerides, Serum: 78  
BMI: BMI (kg/m2): 19.9 (03-09-21 @ 09:02)  HbA1c: A1C with Estimated Average Glucose Result: 5.4 % (01-16-21 @ 07:55)    Glucose:   BP: 117/79 (03-15-21 @ 06:26) (117/79 - 117/79)  Lipid Panel: Date/Time: 01-16-21 @ 07:55  Cholesterol, Serum: 195  Direct LDL: --  HDL Cholesterol, Serum: 75  Total Cholesterol/HDL Ration Measurement: --  Triglycerides, Serum: 78  
BMI: BMI (kg/m2): 19.9 (03-09-21 @ 09:02)  HbA1c: A1C with Estimated Average Glucose Result: 5.4 % (01-16-21 @ 07:55)    Glucose:   BP: --  Lipid Panel: Date/Time: 01-16-21 @ 07:55  Cholesterol, Serum: 195  Direct LDL: --  HDL Cholesterol, Serum: 75  Total Cholesterol/HDL Ration Measurement: --  Triglycerides, Serum: 78  
BMI: BMI (kg/m2): 19.9 (03-09-21 @ 09:02)  HbA1c: A1C with Estimated Average Glucose Result: 5.4 % (01-16-21 @ 07:55)    Glucose:   BP: --  Lipid Panel: Date/Time: 01-16-21 @ 07:55  Cholesterol, Serum: 195  Direct LDL: --  HDL Cholesterol, Serum: 75  Total Cholesterol/HDL Ration Measurement: --  Triglycerides, Serum: 78  
BMI: BMI (kg/m2): 19.9 (03-09-21 @ 09:02)  HbA1c: A1C with Estimated Average Glucose Result: 5.4 % (01-16-21 @ 07:55)    Glucose:   BP: 97/63 (03-17-21 @ 08:34) (97/63 - 117/79)  Lipid Panel: Date/Time: 01-16-21 @ 07:55  Cholesterol, Serum: 195  Direct LDL: --  HDL Cholesterol, Serum: 75  Total Cholesterol/HDL Ration Measurement: --  Triglycerides, Serum: 78  
BMI: BMI (kg/m2): 19.9 (03-09-21 @ 09:02)  HbA1c: A1C with Estimated Average Glucose Result: 5.4 % (01-16-21 @ 07:55)    Glucose:   BP: 105/51 (03-20-21 @ 19:31) (93/62 - 105/51)  Lipid Panel: Date/Time: 01-16-21 @ 07:55  Cholesterol, Serum: 195  Direct LDL: --  HDL Cholesterol, Serum: 75  Total Cholesterol/HDL Ration Measurement: --  Triglycerides, Serum: 78  
BMI: BMI (kg/m2): 19.9 (03-09-21 @ 09:02)  HbA1c: A1C with Estimated Average Glucose Result: 5.4 % (01-16-21 @ 07:55)    Glucose:   BP: --  Lipid Panel: Date/Time: 01-16-21 @ 07:55  Cholesterol, Serum: 195  Direct LDL: --  HDL Cholesterol, Serum: 75  Total Cholesterol/HDL Ration Measurement: --  Triglycerides, Serum: 78  
BMI: BMI (kg/m2): 19.9 (03-09-21 @ 09:02)  HbA1c: A1C with Estimated Average Glucose Result: 5.4 % (01-16-21 @ 07:55)    Glucose:   BP: 97/63 (03-17-21 @ 08:34) (97/63 - 97/63)  Lipid Panel: Date/Time: 01-16-21 @ 07:55  Cholesterol, Serum: 195  Direct LDL: --  HDL Cholesterol, Serum: 75  Total Cholesterol/HDL Ration Measurement: --  Triglycerides, Serum: 78

## 2021-03-22 NOTE — BH INPATIENT PSYCHIATRY PROGRESS NOTE - NSBHMSEBEHAV_PSY_A_CORE
Uncooperative
Uncooperative
Other
Other
Cooperative
Uncooperative
Other
Uncooperative
Other
Uncooperative

## 2021-03-22 NOTE — BH INPATIENT PSYCHIATRY PROGRESS NOTE - NSBHMSEBODY_PSY_A_CORE
Well nourished/Other
Other
Well nourished/Other
Other
Well nourished/Other
Other

## 2021-03-22 NOTE — BH INPATIENT PSYCHIATRY PROGRESS NOTE - NSTXSUBMISINTERMD_PSY_ALL_CORE
substance abuse counseling, psychoed

## 2021-03-22 NOTE — BH INPATIENT PSYCHIATRY PROGRESS NOTE - NSTXCOPEPROGRES_PSY_ALL_CORE
No Change
Met - goal discontinued
No Change
Met - goal discontinued
No Change

## 2021-03-22 NOTE — BH INPATIENT PSYCHIATRY PROGRESS NOTE - NSBHMSEMOOD_PSY_A_CORE
Normal
Irritable
Irritable/Angry
Irritable

## 2021-03-22 NOTE — BH INPATIENT PSYCHIATRY PROGRESS NOTE - NSBHMETABOLICLABS_PSY_ALL_CORE
All labs not within last 12 months, ordered

## 2021-03-22 NOTE — BH INPATIENT PSYCHIATRY PROGRESS NOTE - NSBHMSESPEECH_PSY_A_CORE
Normal volume, rate, productivity, spontaneity and articulation
Non-verbal: unable to assess speech further
Non-verbal: unable to assess speech further
Normal volume, rate, productivity, spontaneity and articulation
Non-verbal: unable to assess speech further
Normal volume, rate, productivity, spontaneity and articulation
Non-verbal: unable to assess speech further
Normal volume, rate, productivity, spontaneity and articulation

## 2021-03-22 NOTE — BH INPATIENT PSYCHIATRY PROGRESS NOTE - NSTXDCOPLKDATETRGT_PSY_ALL_CORE
19-Mar-2021
17-Mar-2021
19-Mar-2021
19-Mar-2021
17-Mar-2021

## 2021-03-22 NOTE — BH INPATIENT PSYCHIATRY PROGRESS NOTE - NSTXMEDICINTERMD_PSY_ALL_CORE
NAGY invega sustenna 

## 2021-03-22 NOTE — BH INPATIENT PSYCHIATRY PROGRESS NOTE - NSBHATTESTSEENBY_PSY_A_CORE
NP without Attending Psychiatrist

## 2021-03-22 NOTE — BH INPATIENT PSYCHIATRY PROGRESS NOTE - NSCGISEVERILLNESS_PSY_ALL_CORE
3 = Mildly ill – clearly established symptoms with minimal, if any, distress or difficulty in social and occupational function
5 = Markedly ill - intrusive symptoms that distinctly impair social/occupational function or cause intrusive levels of distress
4 = Moderately ill – overt symptoms causing noticeable, but modest, functional impairment or distress; symptom level may warrant medication
5 = Markedly ill - intrusive symptoms that distinctly impair social/occupational function or cause intrusive levels of distress

## 2021-03-22 NOTE — BH INPATIENT PSYCHIATRY PROGRESS NOTE - NSDCCRITERIA_PSY_ALL_CORE
CGI <=2, return to baseline functioning as evidenced by behavioral control, staff observations, pt self report 

## 2021-03-22 NOTE — BH INPATIENT PSYCHIATRY PROGRESS NOTE - NSTXPSYCHOGOAL_PSY_ALL_CORE
Will identify 2 coping skills that help mitigate hallucinations

## 2021-03-22 NOTE — BH INPATIENT PSYCHIATRY PROGRESS NOTE - NSICDXBHPRIMARYDX_PSY_ALL_CORE
Schizophrenia   F20.9  

## 2021-03-22 NOTE — BH INPATIENT PSYCHIATRY PROGRESS NOTE - NSTXPROBMEDIC_PSY_ALL_CORE
MEDICATION/TREATMENT NON-COMPLIANCE

## 2021-03-22 NOTE — BH INPATIENT PSYCHIATRY PROGRESS NOTE - NSTXMEDICGOAL_PSY_ALL_CORE
Take all medications as prescribed

## 2021-03-22 NOTE — BH INPATIENT PSYCHIATRY PROGRESS NOTE - NSBHFUPINTERVALHXFT_PSY_A_CORE
Patient seen for follow up for SI and substance abuse, chart reviewed, and case discussed with treatment team.  No events reported overnight; staff reports pt requested prolixin and benadryl PO PRNs for insomnia. When asked about this, the pt stated the unit was loud and she is normally able to sleep in the community with just melatonin. Pt took invega sustenna 234 mg IM Q 4 weeks 3/11/21 and denies any SE. On approach, pt in bed, cooperative with assessment and denied any A/VH, SIIP, HIIP, or paranoia. IMT requested that they  the pt for discharge today at 1 PM. Pt educated on the use of 911 or going to the nearest ED of safety concerns should arise in the community; pt verbalized understanding.

## 2021-03-22 NOTE — BH INPATIENT PSYCHIATRY PROGRESS NOTE - NSBHASSESSSUMMFT_PSY_ALL_CORE
Pt denies SIIP, HIIP, A/VH, or paranoia and discharge focused. Irritable, refusing blood work or VS. Took invega sustenna 234 mg IM Q 4 weeks injection 3/11/21    -invega sustenna 234 mg IM Q 4 weeks given 3/11/21  -shelter referral to be placed with projected discharge this week  -discontinue PO risperdal   -encourage pt to participate in treatment  -encourage ADLs  -encourage re-ordered labs  -encourage VS 
Pt denies SIIP, HIIP, A/VH, or paranoia and discharge to IMT team today. Took invega sustenna 234 mg IM Q 4 weeks injection 3/11/21  
Pt is refusing to answer assessment questions verbally, only nodding her head yes or no. Took invega sustenna 234 mg IM Q 4 weeks injection 3/11/21    -invega sustenna 234 mg IM Q 4 weeks given 3/11/21  -discontinue PO risperdal   -encourage pt to participate in treatment  -encourage ADLs  -encourage re-ordered labs  -encourage VS 
Pt is refusing to answer assessment questions verbally, only nodding her head yes or no. Took invega sustenna 234 mg IM Q 4 weeks injection 3/11/21    -invega sustenna 234 mg IM Q 4 weeks given 3/11/21  -discontinue PO risperdal   -encourage pt to participate in treatment  -encourage ADLs  -encourage re-ordered labs  -encourage VS 
Pt is refusing to answer assessment questions. Noted to refuse risperdal QHS dose and insisted on seroquel instead. Pt is due for invega sustenna 234 mf IM Q 4 weeks injection today, but has not been cooperative with administration.     -encourage NAGY invega sustenna 234 mg IM Q 4 weeks compliance  -continue PO risperdal until pt takes NAGY  -encourage pt to participate in treatment  -encourage ADLs
Pt denies SIIP, HIIP, A/VH, or paranoia and discharge focused. Irritable, refusing blood work or VS. Took invega sustenna 234 mg IM Q 4 weeks injection 3/11/21    -invega sustenna 234 mg IM Q 4 weeks given 3/11/21  -shelter referral to be placed with projected discharge this week  -discontinue PO risperdal   -encourage pt to participate in treatment  -encourage ADLs  -encourage re-ordered labs  -encourage VS 
Pt denies SIIP, HIIP, A/VH, or paranoia and discharge focused. Irritable, refusing blood work or VS. Took invega sustenna 234 mg IM Q 4 weeks injection 3/11/21    -invega sustenna 234 mg IM Q 4 weeks given 3/11/21  -shelter referral to be placed with projected discharge this week  -discontinue PO risperdal   -encourage pt to participate in treatment  -encourage ADLs  -encourage re-ordered labs  -encourage VS 
Pt is refusing to answer most assessment questions. Irritable, refusing blood work. Took invega sustenna 234 mg IM Q 4 weeks injection 3/11/21    -invega sustenna 234 mg IM Q 4 weeks given 3/11/21  -shelter referral to be placed with projected discharge this week  -discontinue PO risperdal   -encourage pt to participate in treatment  -encourage ADLs  -encourage re-ordered labs  -encourage VS 
Pt is refusing to answer assessment questions verbally, only nodding her head yes or no. Took invega sustenna 234 mg IM Q 4 weeks injection 3/11/21    -invega sustenna 234 mg IM Q 4 weeks given 3/11/21  -discontinue PO risperdal   -encourage pt to participate in treatment  -encourage ADLs  -encourage re-ordered labs  -encourage VS 
Pt denies SIIP, HIIP, A/VH, or paranoia and discharge focused. Irritable, refusing blood work or VS. Took invega sustenna 234 mg IM Q 4 weeks injection 3/11/21    -invega sustenna 234 mg IM Q 4 weeks given 3/11/21  -shelter referral to be placed with projected discharge this week  -discontinue PO risperdal   -encourage pt to participate in treatment  -encourage ADLs  -encourage re-ordered labs  -encourage VS   -discharge to IMT team on 3/22/21

## 2021-03-22 NOTE — BH INPATIENT PSYCHIATRY PROGRESS NOTE - NSTXPROBPSYCHO_PSY_ALL_CORE
PSYCHOTIC SYMPTOMS

## 2021-03-22 NOTE — BH INPATIENT PSYCHIATRY PROGRESS NOTE - NSTXDCOPLKDATEEST_PSY_ALL_CORE
18-Mar-2021
10-Mar-2021
10-Mar-2021
18-Mar-2021
10-Mar-2021
18-Mar-2021
10-Mar-2021
10-Mar-2021

## 2021-03-22 NOTE — BH INPATIENT PSYCHIATRY PROGRESS NOTE - LEVEL OF CONSCIOUSNESS
Lethargic, arousable to verbal stimulus

## 2021-03-22 NOTE — BH INPATIENT PSYCHIATRY PROGRESS NOTE - GENERAL APPEARANCE
Well developed
No deformities present
Well developed
No deformities present
Well developed

## 2021-03-22 NOTE — BH INPATIENT PSYCHIATRY PROGRESS NOTE - NSTXDCOPLKINTERMD_PSY_ALL_CORE
psychoed, has IMT team

## 2021-03-22 NOTE — BH INPATIENT PSYCHIATRY PROGRESS NOTE - NSTXDCOPLKPROGRES_PSY_ALL_CORE
Improving
No Change
Met - goal discontinued
No Change
Improving

## 2021-03-22 NOTE — BH INPATIENT PSYCHIATRY PROGRESS NOTE - NSTXPROBALCDRG_PSY_ALL_CORE
ALCOHOL OR DRUG WITHDRAWAL

## 2021-03-22 NOTE — BH INPATIENT PSYCHIATRY PROGRESS NOTE - NSTXCOPEDATETRGT_PSY_ALL_CORE
17-Mar-2021
19-Mar-2021
17-Mar-2021
19-Mar-2021
17-Mar-2021
17-Mar-2021

## 2021-03-22 NOTE — BH INPATIENT PSYCHIATRY PROGRESS NOTE - NSTXDEPRESPROGRES_PSY_ALL_CORE
No Change
No Change
Improving
No Change
No Change
Met - goal discontinued
fall
No Change

## 2021-03-22 NOTE — BH INPATIENT PSYCHIATRY PROGRESS NOTE - NSBHINPTBILLING_PSY_ALL_CORE
34313 - Inpatient Moderate Complexity
69260 - Inpatient Moderate Complexity
83513 - Inpatient Moderate Complexity
48307 - Inpatient Moderate Complexity
33682 - Hospital Discharge Day Management; 30 min or less
07716 - Inpatient Moderate Complexity
00273 - Inpatient Moderate Complexity
72660 - Inpatient Moderate Complexity
22346 - Inpatient Moderate Complexity
40390 - Inpatient Moderate Complexity

## 2021-03-22 NOTE — BH INPATIENT PSYCHIATRY PROGRESS NOTE - NSTXPSYCHODATEEST_PSY_ALL_CORE
10-Mar-2021

## 2021-03-22 NOTE — BH INPATIENT PSYCHIATRY PROGRESS NOTE - NSTXDEPRESDATETRGT_PSY_ALL_CORE
23-Mar-2021
17-Mar-2021
23-Mar-2021

## 2021-03-22 NOTE — BH INPATIENT PSYCHIATRY PROGRESS NOTE - NSTXALCDRGPROGRES_PSY_ALL_CORE
Met - goal discontinued
Improving
No Change
No Change
Improving
Improving
No Change
Met - goal discontinued
Improving
No Change

## 2021-03-22 NOTE — BH INPATIENT PSYCHIATRY PROGRESS NOTE - NSBHMSETHTCONTENT_PSY_A_CORE
Unremarkable
Suicidality/Other
Preoccupations
Suicidality/Other
Preoccupations
Suicidality/Other
Preoccupations
Suicidality/Other

## 2021-03-22 NOTE — BH INPATIENT PSYCHIATRY PROGRESS NOTE - NSTXSUBMISPROGRES_PSY_ALL_CORE
Met - goal discontinued
No Change
Improving

## 2021-03-22 NOTE — BH INPATIENT PSYCHIATRY PROGRESS NOTE - NSTXMEDICPROGRES_PSY_ALL_CORE
Improving
Met - goal discontinued
Improving
No Change
No Change
Met - goal discontinued
No Change
Improving
No Change
Improving

## 2021-03-22 NOTE — BH INPATIENT PSYCHIATRY PROGRESS NOTE - NSCGIIMPROVESX_PSY_ALL_CORE
3 = Minimally improved - slightly better with little or no clinically meaningful reduction of symptoms.  Represents very little change in basic clinical status, level of care, or functional capacity.
4 = No change - symptoms remain essentially unchanged
2 = Much improved - notably better with signficant reduction of symptoms; increase in the level of functioning but some symptoms remain
4 = No change - symptoms remain essentially unchanged

## 2021-03-22 NOTE — BH INPATIENT PSYCHIATRY PROGRESS NOTE - NSTXCOPEINTERMD_PSY_ALL_CORE
psychoed, group therapy 

## 2021-03-22 NOTE — BH INPATIENT PSYCHIATRY PROGRESS NOTE - NSBHFUPINTERVALCCFT_PSY_A_CORE
pt refused t o participate in assessment 
psychosis
pt refused t o participate in assessment 
pt refused t o participate in assessment

## 2021-03-22 NOTE — BH INPATIENT PSYCHIATRY PROGRESS NOTE - NSTXALCDRGGOAL_PSY_ALL_CORE
Will not display signs of withdrawal

## 2021-03-22 NOTE — BH INPATIENT PSYCHIATRY PROGRESS NOTE - NSTXMEDICDATEEST_PSY_ALL_CORE
10-Mar-2021

## 2021-03-22 NOTE — BH INPATIENT PSYCHIATRY PROGRESS NOTE - PRN MEDS
MEDICATIONS  (PRN):  diphenhydrAMINE 50 milliGRAM(s) Oral every 6 hours PRN agitation  diphenhydrAMINE   Injectable 50 milliGRAM(s) IntraMuscular once PRN agitation  fluPHENAZine 5 milliGRAM(s) Oral every 6 hours PRN agitation  fluPHENAZine  Injectable 5 milliGRAM(s) IntraMuscular once PRN agitation  hydrOXYzine hydrochloride 50 milliGRAM(s) Oral every 6 hours PRN anxiety  LORazepam   Injectable 2 milliGRAM(s) IntraMuscular once PRN agitation  nicotine  Polacrilex Gum 2 milliGRAM(s) Oral every 2 hours PRN breakthrough cravings  
MEDICATIONS  (PRN):  diphenhydrAMINE 50 milliGRAM(s) Oral every 6 hours PRN agitation  diphenhydrAMINE   Injectable 50 milliGRAM(s) IntraMuscular once PRN agitation  fluPHENAZine 5 milliGRAM(s) Oral every 6 hours PRN agitation  fluPHENAZine  Injectable 5 milliGRAM(s) IntraMuscular once PRN agitation  hydrOXYzine hydrochloride 50 milliGRAM(s) Oral every 6 hours PRN anxiety  LORazepam   Injectable 2 milliGRAM(s) IntraMuscular once PRN agitation  
MEDICATIONS  (PRN):  diphenhydrAMINE 50 milliGRAM(s) Oral every 6 hours PRN agitation  diphenhydrAMINE   Injectable 50 milliGRAM(s) IntraMuscular once PRN agitation  fluPHENAZine 5 milliGRAM(s) Oral every 6 hours PRN agitation  fluPHENAZine  Injectable 5 milliGRAM(s) IntraMuscular once PRN agitation  hydrOXYzine hydrochloride 50 milliGRAM(s) Oral every 6 hours PRN anxiety  LORazepam   Injectable 2 milliGRAM(s) IntraMuscular once PRN agitation  nicotine  Polacrilex Gum 2 milliGRAM(s) Oral every 2 hours PRN breakthrough cravings  
MEDICATIONS  (PRN):  diphenhydrAMINE 50 milliGRAM(s) Oral every 6 hours PRN agitation  diphenhydrAMINE   Injectable 50 milliGRAM(s) IntraMuscular once PRN agitation  fluPHENAZine 5 milliGRAM(s) Oral every 6 hours PRN agitation  fluPHENAZine  Injectable 5 milliGRAM(s) IntraMuscular once PRN agitation  hydrOXYzine hydrochloride 50 milliGRAM(s) Oral every 6 hours PRN anxiety  LORazepam   Injectable 2 milliGRAM(s) IntraMuscular once PRN agitation  
MEDICATIONS  (PRN):  diphenhydrAMINE 50 milliGRAM(s) Oral every 6 hours PRN agitation  diphenhydrAMINE   Injectable 50 milliGRAM(s) IntraMuscular once PRN agitation  fluPHENAZine 5 milliGRAM(s) Oral every 6 hours PRN agitation  fluPHENAZine  Injectable 5 milliGRAM(s) IntraMuscular once PRN agitation  hydrOXYzine hydrochloride 50 milliGRAM(s) Oral every 6 hours PRN anxiety  LORazepam   Injectable 2 milliGRAM(s) IntraMuscular once PRN agitation  nicotine  Polacrilex Gum 2 milliGRAM(s) Oral every 2 hours PRN breakthrough cravings  
MEDICATIONS  (PRN):  diphenhydrAMINE 50 milliGRAM(s) Oral every 6 hours PRN agitation  diphenhydrAMINE   Injectable 50 milliGRAM(s) IntraMuscular once PRN agitation  fluPHENAZine 5 milliGRAM(s) Oral every 6 hours PRN agitation  fluPHENAZine  Injectable 5 milliGRAM(s) IntraMuscular once PRN agitation  hydrOXYzine hydrochloride 50 milliGRAM(s) Oral every 6 hours PRN anxiety  LORazepam   Injectable 2 milliGRAM(s) IntraMuscular once PRN agitation  
MEDICATIONS  (PRN):  diphenhydrAMINE 50 milliGRAM(s) Oral every 6 hours PRN agitation  diphenhydrAMINE   Injectable 50 milliGRAM(s) IntraMuscular once PRN agitation  fluPHENAZine 5 milliGRAM(s) Oral every 6 hours PRN agitation  fluPHENAZine  Injectable 5 milliGRAM(s) IntraMuscular once PRN agitation  hydrOXYzine hydrochloride 50 milliGRAM(s) Oral every 6 hours PRN anxiety  LORazepam   Injectable 2 milliGRAM(s) IntraMuscular once PRN agitation  nicotine  Polacrilex Gum 2 milliGRAM(s) Oral every 2 hours PRN breakthrough cravings  
MEDICATIONS  (PRN):  diphenhydrAMINE 50 milliGRAM(s) Oral every 6 hours PRN agitation  diphenhydrAMINE   Injectable 50 milliGRAM(s) IntraMuscular once PRN agitation  fluPHENAZine 5 milliGRAM(s) Oral every 6 hours PRN agitation  fluPHENAZine  Injectable 5 milliGRAM(s) IntraMuscular once PRN agitation  hydrOXYzine hydrochloride 50 milliGRAM(s) Oral every 6 hours PRN anxiety  LORazepam   Injectable 2 milliGRAM(s) IntraMuscular once PRN agitation  nicotine  Polacrilex Gum 2 milliGRAM(s) Oral every 2 hours PRN breakthrough cravings

## 2021-03-22 NOTE — BH INPATIENT PSYCHIATRY PROGRESS NOTE - NSTXSUBMISGOAL_PSY_ALL_CORE
Be able to acknowledge that substance abuse is a problem

## 2021-03-22 NOTE — BH INPATIENT PSYCHIATRY PROGRESS NOTE - CURRENT MEDICATION
MEDICATIONS  (STANDING):  melatonin. 6 milliGRAM(s) Oral at bedtime    MEDICATIONS  (PRN):  diphenhydrAMINE 50 milliGRAM(s) Oral every 6 hours PRN agitation  diphenhydrAMINE   Injectable 50 milliGRAM(s) IntraMuscular once PRN agitation  fluPHENAZine 5 milliGRAM(s) Oral every 6 hours PRN agitation  fluPHENAZine  Injectable 5 milliGRAM(s) IntraMuscular once PRN agitation  hydrOXYzine hydrochloride 50 milliGRAM(s) Oral every 6 hours PRN anxiety  LORazepam   Injectable 2 milliGRAM(s) IntraMuscular once PRN agitation  
MEDICATIONS  (STANDING):  melatonin. 6 milliGRAM(s) Oral at bedtime    MEDICATIONS  (PRN):  diphenhydrAMINE 50 milliGRAM(s) Oral every 6 hours PRN agitation  diphenhydrAMINE   Injectable 50 milliGRAM(s) IntraMuscular once PRN agitation  fluPHENAZine 5 milliGRAM(s) Oral every 6 hours PRN agitation  fluPHENAZine  Injectable 5 milliGRAM(s) IntraMuscular once PRN agitation  hydrOXYzine hydrochloride 50 milliGRAM(s) Oral every 6 hours PRN anxiety  LORazepam   Injectable 2 milliGRAM(s) IntraMuscular once PRN agitation  nicotine  Polacrilex Gum 2 milliGRAM(s) Oral every 2 hours PRN breakthrough cravings  
MEDICATIONS  (STANDING):  melatonin. 6 milliGRAM(s) Oral at bedtime    MEDICATIONS  (PRN):  diphenhydrAMINE 50 milliGRAM(s) Oral every 6 hours PRN agitation  diphenhydrAMINE   Injectable 50 milliGRAM(s) IntraMuscular once PRN agitation  fluPHENAZine 5 milliGRAM(s) Oral every 6 hours PRN agitation  fluPHENAZine  Injectable 5 milliGRAM(s) IntraMuscular once PRN agitation  hydrOXYzine hydrochloride 50 milliGRAM(s) Oral every 6 hours PRN anxiety  LORazepam   Injectable 2 milliGRAM(s) IntraMuscular once PRN agitation  nicotine  Polacrilex Gum 2 milliGRAM(s) Oral every 2 hours PRN breakthrough cravings  
MEDICATIONS  (STANDING):  melatonin. 3 milliGRAM(s) Oral at bedtime  paliperidone Injectable, Long Acting 234 milliGRAM(s) IntraMuscular once  risperiDONE  Disintegrating Tablet 1 milliGRAM(s) Oral at bedtime    MEDICATIONS  (PRN):  diphenhydrAMINE 50 milliGRAM(s) Oral every 6 hours PRN agitation  diphenhydrAMINE   Injectable 50 milliGRAM(s) IntraMuscular once PRN agitation  fluPHENAZine 5 milliGRAM(s) Oral every 6 hours PRN agitation  fluPHENAZine  Injectable 5 milliGRAM(s) IntraMuscular once PRN agitation  hydrOXYzine hydrochloride 50 milliGRAM(s) Oral every 6 hours PRN anxiety  LORazepam   Injectable 2 milliGRAM(s) IntraMuscular once PRN agitation  
MEDICATIONS  (STANDING):  melatonin. 6 milliGRAM(s) Oral at bedtime    MEDICATIONS  (PRN):  diphenhydrAMINE 50 milliGRAM(s) Oral every 6 hours PRN agitation  diphenhydrAMINE   Injectable 50 milliGRAM(s) IntraMuscular once PRN agitation  fluPHENAZine 5 milliGRAM(s) Oral every 6 hours PRN agitation  fluPHENAZine  Injectable 5 milliGRAM(s) IntraMuscular once PRN agitation  hydrOXYzine hydrochloride 50 milliGRAM(s) Oral every 6 hours PRN anxiety  LORazepam   Injectable 2 milliGRAM(s) IntraMuscular once PRN agitation  nicotine  Polacrilex Gum 2 milliGRAM(s) Oral every 2 hours PRN breakthrough cravings  
MEDICATIONS  (STANDING):  melatonin. 3 milliGRAM(s) Oral at bedtime    MEDICATIONS  (PRN):  diphenhydrAMINE 50 milliGRAM(s) Oral every 6 hours PRN agitation  diphenhydrAMINE   Injectable 50 milliGRAM(s) IntraMuscular once PRN agitation  fluPHENAZine 5 milliGRAM(s) Oral every 6 hours PRN agitation  fluPHENAZine  Injectable 5 milliGRAM(s) IntraMuscular once PRN agitation  hydrOXYzine hydrochloride 50 milliGRAM(s) Oral every 6 hours PRN anxiety  LORazepam   Injectable 2 milliGRAM(s) IntraMuscular once PRN agitation  
MEDICATIONS  (STANDING):  melatonin. 6 milliGRAM(s) Oral at bedtime    MEDICATIONS  (PRN):  diphenhydrAMINE 50 milliGRAM(s) Oral every 6 hours PRN agitation  diphenhydrAMINE   Injectable 50 milliGRAM(s) IntraMuscular once PRN agitation  fluPHENAZine 5 milliGRAM(s) Oral every 6 hours PRN agitation  fluPHENAZine  Injectable 5 milliGRAM(s) IntraMuscular once PRN agitation  hydrOXYzine hydrochloride 50 milliGRAM(s) Oral every 6 hours PRN anxiety  LORazepam   Injectable 2 milliGRAM(s) IntraMuscular once PRN agitation  nicotine  Polacrilex Gum 2 milliGRAM(s) Oral every 2 hours PRN breakthrough cravings  

## 2021-03-22 NOTE — BH INPATIENT PSYCHIATRY PROGRESS NOTE - NSBHCHARTREVIEWVS_PSY_A_CORE FT
Vital Signs Last 24 Hrs  T(C): 36.4 (15 Mar 2021 20:33), Max: 36.4 (15 Mar 2021 20:33)  T(F): 97.6 (15 Mar 2021 20:33), Max: 97.6 (15 Mar 2021 20:33)  HR: --  BP: --  BP(mean): --  RR: --  SpO2: --
Vital Signs Last 24 Hrs  T(C): 36.7 (17 Mar 2021 08:34), Max: 36.7 (17 Mar 2021 08:34)  T(F): 98 (17 Mar 2021 08:34), Max: 98 (17 Mar 2021 08:34)  HR: 64 (17 Mar 2021 08:34) (64 - 64)  BP: 97/63 (17 Mar 2021 08:34) (97/63 - 97/63)  BP(mean): --  RR: --  SpO2: --
Vital Signs Last 24 Hrs  T(C): 36.3 (13 Mar 2021 08:28), Max: 36.3 (13 Mar 2021 08:28)  T(F): 97.4 (13 Mar 2021 08:28), Max: 97.4 (13 Mar 2021 08:28)  HR: --  BP: --  BP(mean): --  RR: --  SpO2: --
Vital Signs Last 24 Hrs  T(C): 36.7 (11 Mar 2021 08:33), Max: 36.7 (11 Mar 2021 08:33)  T(F): 98 (11 Mar 2021 08:33), Max: 98 (11 Mar 2021 08:33)  HR: --  BP: --  BP(mean): --  RR: --  SpO2: --
Vital Signs Last 24 Hrs  T(C): 36.6 (18 Mar 2021 08:38), Max: 36.6 (18 Mar 2021 08:38)  T(F): 97.9 (18 Mar 2021 08:38), Max: 97.9 (18 Mar 2021 08:38)  HR: --  BP: --  BP(mean): --  RR: --  SpO2: --
Vital Signs Last 24 Hrs  T(C): 36.4 (19 Mar 2021 08:15), Max: 36.8 (18 Mar 2021 20:27)  T(F): 97.5 (19 Mar 2021 08:15), Max: 98.2 (18 Mar 2021 20:27)  HR: --  BP: --  BP(mean): --  RR: --  SpO2: --
Vital Signs Last 24 Hrs  T(C): --  T(F): --  HR: --  BP: --  BP(mean): --  RR: --  SpO2: --
Vital Signs Last 24 Hrs  T(C): 36 (22 Mar 2021 08:33), Max: 36 (22 Mar 2021 08:33)  T(F): 96.8 (22 Mar 2021 08:33), Max: 96.8 (22 Mar 2021 08:33)  HR: --63  BP: --98/58  BP(mean): --  RR: --  SpO2: --
Vital Signs Last 24 Hrs  T(C): 36.3 (15 Mar 2021 06:26), Max: 36.3 (15 Mar 2021 06:26)  T(F): 97.4 (15 Mar 2021 06:26), Max: 97.4 (15 Mar 2021 06:26)  HR: 70 (15 Mar 2021 06:26) (70 - 70)  BP: 117/79 (15 Mar 2021 06:26) (117/79 - 117/79)  BP(mean): --  RR: --  SpO2: --
Vital Signs Last 24 Hrs  T(C): 36.8 (14 Mar 2021 08:35), Max: 36.8 (14 Mar 2021 08:35)  T(F): 98.2 (14 Mar 2021 08:35), Max: 98.2 (14 Mar 2021 08:35)  HR: --  BP: --  BP(mean): --  RR: --  SpO2: --

## 2021-03-22 NOTE — BH INPATIENT PSYCHIATRY PROGRESS NOTE - NSTXDEPRESGOAL_PSY_ALL_CORE
Report using a coping skill to overcome sadness and worry in order to socialize with peers daily

## 2021-03-22 NOTE — BH INPATIENT PSYCHIATRY PROGRESS NOTE - NSTXPROBSUBMIS_PSY_ALL_CORE
SUBSTANCE MISUSE

## 2021-03-29 NOTE — SOCIAL WORK POST DISCHARGE FOLLOW UP NOTE - NSBHSWFOLLOWUP_PSY_ALL_CORE_FT
Pt being followed by Traci Conteh, ORACIO/ deni Kurtz [phone]/  IMT carolyn CASES/ Pt was stable at discharge, familiar with community supports and emergency contacts.

## 2021-03-29 NOTE — SOCIAL WORK POST DISCHARGE FOLLOW UP NOTE - NSBHSWFOLLOWUP_PSY_ALL_CORE_FT
Pt did not keep hyacinth, follow up made with Traci Conteh, ORACIO/ deni [phone]/  IMT carolyn CASES

## 2021-10-21 NOTE — ED BEHAVIORAL HEALTH ASSESSMENT NOTE - SUBSTANCE ISSUES AND PLAN (INCLUDE STANDING AND PRN MEDICATION)
Routine safety standards initiated.  Routine nursing standards initiated.   f/u urine toxicology. substance education as pt better able to participate. No known recent EtOH use warranting withdrawal tx at this time.

## 2021-12-12 NOTE — ED PROVIDER NOTE - OBJECTIVE STATEMENT
40 year old female with extensive psychiatric history including multiple prior psychiatric hospitalizations with most recent in Jan 2021, substance induced mood disorder, suicide attempts, depression presents to ED with concern for feeling suicidal today.  Patient states she does not have any formal psychiatric diagnosis aside from "feeling sad."  Patient uncooperative with staff on arrival to ED, demanding "voluntary admission."  She denies plan to harm herself or recent suicide attempt.  She denies associated fever, chills, chest pain, shortness of breath, abdominal pain, nausea, emesis, changes to bowel movements, peripheral edema, rashes, recent travel, known sick contacts or any additional acute complaints or concerns at this time. 1st Trimester Sonogram/20 Week Level II Sonogram/Other

## 2022-02-14 NOTE — ED BEHAVIORAL HEALTH ASSESSMENT NOTE - INSIGHT (REGARDING PSYCHIATRIC ILLNESS)
Patient Assessment Instrument  Quality Indicators - Admission    Section B. Hearing, Speech Vision      Section C. Cognitive Patterns      Section [id number]. Prior Functioning    Self Care: Patient completed the activities by him/herself, with or without an  assistive device, with no assistance from a helper.  Indoor Mobility: Patient completed the activities by him/herself, with or  without an assistive device, with no assistance from a helper.  Stairs: Patient completed the activities by him/herself, with or without an  assistive device, with no assistance from a helper.  Functional Cognition: Patient completed the activities by him/herself, with or  without an assistive device, with no assistance from a helper.    Section [id number]. Prior Device Use      Section [id number]. Self Care Performance      Section LS3291. Self Care Discharge Goals      Section [id number]. Mobility Performance      Section [id number]. Mobility Discharge Goals      Section H. Bladder and Bowel  Bladder Continence: Incontinent less than daily (e.g., once or twice during the  3-day assessment period).  Bowel Continence: Always continent (no documented incontinence).    Section I. Active Diagnosis  Comorbidities and Co-existing Conditions:   Diabetes Mellitus (DM) - e.g.,  diabetic retinopathy, nephropathy, and neuropathy).    Section J. Health Conditions  Patient has not had any falls in the past year. Patient has had major surgery  during the 100 days prior to admission.    Section K. Swallowing/Nutritional Status  Tube/parenteral feeding (used wholly or partially as a means of sustenance)    Section M. Skin Conditions  Unhealed Pressure Ulcer/Injuries at Stage 1 or Higher on Admission:  No.    Section N. Medication    Potential Clinically Significant Medication Issues: No issues found during  review    Section O. Special Treatments, Procedures, and Programs  Patient did not receive total parenteral nutrition treatment at the time  of  admission.    OPTIONAL BRANCH FOR TRACKING FALLS  Fall(s) During Shift: No falls.    Signed by: Melina Mccabe, Supervisor     Poor

## 2022-09-27 NOTE — BH INPATIENT PSYCHIATRY PROGRESS NOTE - NSBHMSETHTPROC_PSY_A_CORE
Linear
Other
Linear
Linear
Other
Benzoyl Peroxide Pregnancy And Lactation Text: This medication is Pregnancy Category C. It is unknown if benzoyl peroxide is excreted in breast milk.

## 2023-04-17 NOTE — BH INPATIENT PSYCHIATRY PROGRESS NOTE - NSBHMSEPERCEPT_PSY_A_CORE
Other
none
Other
No abnormalities
Other
No abnormalities
Other
No abnormalities
No abnormalities
Other
No abnormalities

## 2023-08-01 NOTE — ED BEHAVIORAL HEALTH ASSESSMENT NOTE - DIFFERENTIAL
Please see anesthesia report for details Unspecified mood disorder, concern for substance-induced depressive d/o  Cocaine Use Disorder  Alcohol Use Disorder  r/o psychotic d/o

## 2023-08-28 NOTE — BH TREATMENT PLAN - NSDCCRITERIA_PSY_ALL_CORE
CGI <=2, return to baseline functioning as evidenced by behavioral control, staff observations, pt self report 
CGI <=2, return to baseline functioning as evidenced by behavioral control, staff observations, pt self report 
Verónica Rojas

## 2023-09-27 NOTE — BH INPATIENT PSYCHIATRY ASSESSMENT NOTE - NSBHMSEMOOD_PSY_A_CORE
Post-Care Instructions: I reviewed with the patient in detail post-care instructions. Patient is to keep the biopsy site dry overnight, and then apply bacitracin twice daily until healed. Patient may apply hydrogen peroxide soaks to remove any crusting. Irritable/Angry

## 2023-12-04 NOTE — BH PATIENT PROFILE - NSTOBACCONEVERSMOKERY/N_GEN_A
After consideration, I'm wondering if this plaque to his inguinal area could possibly be Nephrogenic Systemic Fibrosis.  I discussed his case with Dr. Sanderson who follows him for shelter, who recommend excisional biopsy of the area. Interesting case.  Yes

## 2025-04-09 NOTE — ED BEHAVIORAL HEALTH ASSESSMENT NOTE - EMPLOYMENT
[facility] EMERGENCY DEPARTMENT  EMERGENCY DEPARTMENT ENCOUNTER        Pt Name: Brent Baugh  MRN: [id number]  Birthdate 2008  Date of evaluation: 4/9/2025  Provider: Compa Britton MD  PCP: [facility] ([facility], [place])  Note Started: 12:43 PM EDT 4/9/25    CHIEF COMPLAINT       Chief Complaint   Patient presents with    Abdominal Pain     Pt from home c/o abdominal pain or \"belly button pain\" x2 days. Pain rated 2/10       HISTORY OF PRESENT ILLNESS: 1 or more Elements     History from : Patient    Limitations to history : None    Brent Baugh is a 16 y.o. female who presents for pain in her abdomen specifically right at the bellybutton for the last 2 days some redness and small amount of clear discharge coming from the bellybutton.  Denies any previous history of bellybutton piercing.  No fevers or chills.  No urinary symptoms.  No other modifying factors.  No pain elsewhere in the abdomen.  No nausea vomiting or diarrhea.  No other GI symptoms.    Nursing Notes were all reviewed and agreed with or any disagreements were addressed in the HPI.    REVIEW OF SYSTEMS :      Review of Systems   Constitutional:  Negative for chills and fever.   Cardiovascular: Negative.    Gastrointestinal:  Positive for abdominal pain. Negative for constipation, diarrhea, nausea and vomiting.   Genitourinary: Negative.  Negative for dysuria.   Skin:  Positive for color change and rash. Negative for wound.   Neurological:  Negative for weakness and numbness.       Positives and Pertinent negatives as per HPI.     SURGICAL HISTORY   History reviewed. No pertinent surgical history.    CURRENTMEDICATIONS       Discharge Medication List as of 4/9/2025 12:31 PM        CONTINUE these medications which have NOT CHANGED    Details   dicyclomine (BENTYL) 10 MG capsule Take 1 capsule by mouth every 6 hours as needed (abdominal cramping), Disp-20 capsule, R-0Normal      ondansetron (ZOFRAN-ODT) 4 MG 
Unemployed

## 2025-05-20 NOTE — H&P ADULT - NSTOBACCOSCREENHP_GEN_A_NCS
-----------------------------------------------------------------  ENDOCRINE CLINIC NOTE  -----------------------------------------------------------------        PATIENT NAME: Arpit Henry  PATIENT : 1954 AGE: 70 y.o.  MRN NUMBER: [id number]  PRIMARY CARE: Danita Damon APRN    ==========================================================================    CHIEF COMPLAINT: Hypercalcemia  DATE OF SERVICE: 25    ==========================================================================    HPI / SUBJECTIVE    70 y.o. female is seen in the clinic today for hypercalcemia and T2DM.    Hypercalcemia:    Patient is known to have calcium Kassidy since around .  No history of kidney stone.  No history of fractures.  Last DEXA scan was in 2024 which showed no evidence of osteoporosis.  No previous history of cancer.  History significant for CKD stage IIIa as well.  Currently taking calcium through nutritional sources through daily product and vitamin D 1000 units daily.  Blood work was in 2024 which showed abnormally normal PTH level with mild hypercalcemia.  Patient had 24-hour urine calcium evaluation along with SPEP evaluation, no significant finding.  Patient completed nuclear medicine parathyroid scan which showed/identified a thyroid nodule therefore patient underwent ultrasound thyroid as well which showed multiple thyroid nodules but does not meet criteria for FNA but require yearly monitoring.  Patient recently had repeat blood work on 2025 which showed corrected calcium within normal limits along with stable PTH level.    Diabetes Mellitus Hx:    -Diagnosis Date:   -Last known A1c: 9.4% - 2025    -Current Therapy / Medications:  Trulicity 3 mgs - two weeks ago  Glipizide 10 mgs - twice a day  Farxiga 10 mgs once a day    -Past tried medications: (Not currently using)  Metformin     -Home BG logs / monitoring: Finger sticks, three times a day, between 150 and  175    -Hypoglycemia: None    -Meals / Dietary Habits:  Three meals a day    -Last eye exam: April 2025 - [facility], no retinopathy reported by patient  -Neuropathy: -ve  -Nephropathy: CKD IIIa with positive microalbumin in April 2025, following nephrology as outpatient Dr. Mayo  -On rosuvastatin  -No CAD or CVA    ==========================================================================                                                PAST MEDICAL HISTORY    Past Medical History:   Diagnosis Date    Diabetes insipidus     4 to 5 years or more    Hyperlipidemia     Years    Hypertension     Years    Hypoglycemia     I dont know    Primary hyperparathyroidism 10/14/2024    Thyroid nodule 10/14/2024    Vitamin D deficiency 10/14/2024       ==========================================================================    PAST SURGICAL HISTORY    Past Surgical History:   Procedure Laterality Date    CYST REMOVAL      NECK, SHOULDER, BREAST, THUMB    HYSTERECTOMY      PARTIAL HYSTERECTOMY    TUBAL ABDOMINAL LIGATION         ==========================================================================    FAMILY HISTORY    Family History   Problem Relation Age of Onset    Hyperlipidemia Mother     Cancer Mother     Heart failure Mother     Hypertension Mother     Thyroid disease Mother     Stroke Mother     Diabetes Father     Hypertension Father     Heart failure Father     Hyperlipidemia Father     Heart failure Maternal Aunt     Cancer Paternal Aunt     Heart failure Paternal Aunt     Hyperlipidemia Maternal Grandmother     Hyperlipidemia Maternal Grandfather     Hyperlipidemia Paternal Grandmother     Heart failure Paternal Grandmother     Hyperlipidemia Paternal Grandfather     Stroke Daughter     Hyperlipidemia Son     Cancer Son     Hyperlipidemia Son        ==========================================================================    SOCIAL HISTORY    Social History     Socioeconomic History    Marital status:      Number of children: 5    Years of education: 12   Tobacco Use    Smoking status: Never     Passive exposure: Never    Smokeless tobacco: Never   Vaping Use    Vaping status: Never Used   Substance and Sexual Activity    Alcohol use: Yes     Comment: 1 X YEAR    Drug use: Never    Sexual activity: Not Currently     Partners: Male     Birth control/protection: None, Tubal ligation, Hysterectomy       ==========================================================================    MEDICATIONS      Current Outpatient Medications:     aspirin 81 MG EC tablet, Take 1 tablet by mouth Daily., Disp: , Rfl:     fexofenadine (ALLEGRA) 180 MG tablet, Take 1 tablet by mouth Daily., Disp: , Rfl:     fluticasone (FLONASE) 50 MCG/ACT nasal spray, Spray 2 sprays every day by intranasal route in the morning., Disp: , Rfl:     glipizide (GLUCOTROL) 5 MG tablet, Take 2 tablets every day by oral route for 90 days. (Patient taking differently: Take 2 tablets by mouth 2 (Two) Times a Day Before Meals.), Disp: , Rfl:     losartan (COZAAR) 100 MG tablet, Take 1 tablet by mouth Daily., Disp: , Rfl:     Magnesium 500 MG tablet, Take 1 tablet by mouth Daily., Disp: , Rfl:     metoprolol succinate XL (TOPROL-XL) 100 MG 24 hr tablet, Take 1 tablet by mouth Daily., Disp: , Rfl:     montelukast (SINGULAIR) 10 MG tablet, Take 1 tablet every day by oral route in the morning for 90 days., Disp: , Rfl:     Omega-3 Fatty Acids (Fish Oil) 1000 MG capsule delayed-release, Take 1 capsule by mouth 2 (Two) Times a Day., Disp: , Rfl:     omeprazole (priLOSEC) 20 MG capsule, Take 1 capsule by mouth Daily., Disp: , Rfl:     ondansetron ODT (ZOFRAN-ODT) 8 MG disintegrating tablet, Take 1 tablet by mouth., Disp: , Rfl:     promethazine (PHENERGAN) 25 MG tablet, Take 1 tablet by mouth., Disp: , Rfl:     rosuvastatin (CRESTOR) 10 MG tablet, Take 2 tablets every day by oral route., Disp: , Rfl:     vitamin B-12 (CYANOCOBALAMIN) 1000 MCG tablet, Take 1  tablet by mouth Daily., Disp: , Rfl:     Zinc 50 MG tablet, Take  by mouth., Disp: , Rfl:     ==========================================================================    ALLERGIES    Allergies   Allergen Reactions    Codeine Hallucinations and Other (See Comments)    Penicillins Rash       ==========================================================================    OBJECTIVE    Vitals:    05/20/25 1218   BP: 125/80   Pulse: 88   SpO2: 96%     Body mass index is 28.63 kg/m².     General: Alert, cooperative, no acute distress  Thyroid:  no enlargement/tenderness/palpable nodules  Lungs: Clear to auscultation bilaterally, respirations unlabored  Heart: Regular rate and rhythm, S1 and S2 normal, no murmur, rub or gallop  Abdomen: Soft, NT, ND and Bowel sounds Positive  Extremities:  Extremities normal, atraumatic, no cyanosis or edema    ==========================================================================    LAB EVALUATION    8/24/2024  PTH 42  Vitamin D 51.6  TSH 1.920  Hemoglobin A1c 9.5%  Phosphorus 3.5, Glucose 180, BUN 19, creatinine 1.09, GFR 54, sodium 138, potassium 4.9, chloride 98, bicarb 26, calcium 10.8 with albumin of 4.3 (corrected calcium 10.6), total bili 0.7, ALP 65, AST 14, ALT 30    4/21/2025  Glucose 150, BUN 14, creatinine 1.07, GFR 55, sodium 139, potassium 4.4, chloride 101, bicarb 20, calcium 10.2 with albumin of 4.2 (corrected calcium of 10), alk phos 63, AST 18, ALT 12  PTH 35  Vitamin D53.4  TSH 1.660  Hemoglobin A1c 9.4%  Urine microalbumin positive at 56  Total cholesterol 124, triglyceride 210, HDL 44, LDL 46    ==========================================================================    NM PARATHYROID PLANAR WITH SPECT AND CT-     TECHNIQUE: 26.3 mCi of technetium 99m sestamibi was injected  intravenously with early and delayed anterior planar imaging of the neck  and chest. Early SPECT-CT imaging of the same region was obtained.     INDICATION: Primary hyperparathyroidism. Most  recent PTH of 58 pg/mL.     COMPARISON: None available        FINDINGS: Planar imaging shows physiologic activity within the salivary  glands, thyroid, heart and partially visualized liver/GI tract. Thyroid  mostly washes out on delayed imaging. However, persistent abnormal focus  of more increased MDP activity overlying the left thyroid lobe on  delayed planar imaging. SPECT-CT imaging localizes the abnormal activity  on planar imaging to a slightly hypodense nodule within the upper left  thyroid lobe (series 3/image 66).        IMPRESSION:  A sestamibi avid nodule within the upper left thyroid lobe  may represent a parathyroid lesion. Primary thyroid lesion remains in  the differential. Recommend further evaluation with thyroid ultrasound.     This report was finalized on 7/29/2024 11:07 AM by Dr. Mervin Campbell M.D on Workstation: [id number]     ==========================================================================    US THYROID     Date of Exam: 8/23/2024 3:13 PM EDT     Indication: Thyroid nodule on Sestambi Scan.     Comparison: Outside parathyroid scan with SPECT imaging 7/24/2024.     Technique: Grayscale and color and Doppler ultrasound imaging of the thyroid performed according to routine thyroid ultrasound protocol, real time with image documentation.        Findings:  The right thyroid lobe measures 3.8 x 1.5 x 1.7 cm. The left thyroid lobe measures 4.3 x 1.5 x 1.6 m. The isthmus measures 5 mm thickness. The thyroid parenchyma appears homogeneous. Multiple small bilateral thyroid nodules are present, with index   nodules included for purposes of this report.     There is a dominant index solid nodule within the left upper thyroid pole anteriorly measuring 1.5 x 1.2 x 1.5 cm (image 41, 44). It is solid, isoechoic, smoothly circumscribed, wider than tall, without calcification, corresponding to a TI-RADS category   3 nodule. This may correspond to the focus of abnormal sestamibi uptake on outside  parathyroid SPECT imaging.     Within the left mid thyroid lobe posteriorly, a 0.7 x 0.6 x 1.0 cm solid nodule is seen. It is hypoechoic, smoothly circumscribed, wider than tall, without calcification, corresponding to a TI-RADS category 4 nodule.     Within the left lower thyroid pole, a 1.2 x 0.9 x 0.8 cm nodule is seen (images 30, 33). It is isoechoic, smoothly circumscribed, wider than tall, without calcification, corresponding to a TI-RADS category 3 nodule.     Within the right upper thyroid pole, a 0.4 x 0.4 x 0.5 cm solid nodule is seen. It is hypoechoic, smoothly circumscribed, wider than tall, without calcification, corresponding to a TI-RADS category 4 nodule.     No definite parathyroid nodularity is seen posterior to the thyroid gland.        IMPRESSION:  Impression:     1. 1.5 cm TI-RADS category 3 nodule in the left upper thyroid pole, which could correspond to the focus of elevated sestamibi uptake on outside SPECT imaging.  2. 1.5 cm TI-RADS category 3 and 1.0 cm TI-RADS category 4 nodule nodules are present. Consider thyroid ultrasound follow-up at 1, 2, and 5 years.     TI-RADS: TR4 - Size between 1 cm and 1.5 cm. Recommend follow up thyroid ultrasound at years 1, 2, 3 and 5 of surveillance.   TI-RADS: TR3 - Size between 1.5 cm and 2.5 cm. Recommend follow up thyroid ultrasound at years 1, 3, and 5 years of  surveillance.     Electronically Signed: Mimi Ballesteros MD    8/26/2024 3:39 PM EDT    Workstation ID: [id number]    ==========================================================================    ASSESSMENT AND PLAN    # Hypercalcemia with primary hyperparathyroidism  - Patient serum calcium and vitamin D and PTH level all within normal limits  - Patient have possible parathyroid nodule but given ultrasound thyroid findings suggesting the left upper nodule is most likely thyroid related rather than parathyroid related but patient have underlying history of CKD stage IIIa which makes her a  surgical candidate  - Since patient current blood work is stable  - Will continue good hydration with 6 to 8 glasses of water every day  - Continue calcium through nutritional sources and vitamin D on supplementation  - Repeat blood work in 6 months time    # Thyroid nodules  - Thyroid labs within normal limits  - Plan for repeat thyroid ultrasound in August 2025    # Type 2 diabetes with significant hyperglycemia  # CKD stage IIIa, following nephrology as outpatient  -Patient last A1c was more than 9% and patient also reported persistently it has been more than 9%  - Patient will benefit from insulin therapy  - Patient already currently on Farxiga, Trulicity and glipizide therapy  - Presence of glipizide therapy can predispose patient for risk for hypoglycemia as well  - Patient will benefit from continued glucose monitoring system, ordered as DME  - New adjusted regimen:  Insulin Lantus/glargine 10 units daily  Farxiga 10 mg 1 pill by mouth daily  Glipizide 10 mg twice a day  Trulicity 3 mg once a week  -Clinical follow-up and repeat blood work in 3 months time  - Additionally patient may require more closer monitoring and if needed patient will reach out to the office and we will download the CGM reading and plan adjustment of therapy accordingly      Return to clinic: 3 months    Entire assessment and plan was discussed and counseled the patient in detail to which patient verbalized understanding and agreed with care.  Answered all queries and concerns.    Part of this note may be an electronic transcription/translation of spoken language to printed text using the Dragon Dictation System.     Note: Portions of this note may have been copied from previous notes but documentation have been reviewed and edited as necessary to support clinical decision making for today's visit.    ==========================================================================    INFORMATION PROVIDED TO PATIENT    Patient Instructions    Please,    Hyperparathyroidism/hypercalcemia  - Maintain good hydration around 6 to 8 glasses of water every day.  - Maintain dietary calcium intake to daily sources.  - Maintain vitamin D 1000 units every day with meal.    Type 2 diabetes melitis  - Continue glipizide 10 mg twice a day  - Continue Farxiga 10 mg once a day  - Continue Trulicity 3 mg once a week  - Start taking insulin Lantus 10 units once a day    Please start checking blood sugar once a day in the morning and maintain logs.  I have also ordered continuous glucose monitoring system called freestyle evelia for you, if covered by the insurance you can start checking blood sugar with the freestyle evelia and can use fingersticks as needed.    Since you will start taking insulin therapy there is always going to be a risk for hypoglycemia/low blood sugar, please carry some source of sugar with you.    Thyroid nodules:  - Please plan for repeat ultrasound thyroid before next visit    Plan for repeat blood work and clinical follow-up in 3 months time.    Repeat blood work and clinical follow-up in 6 months time.    Thank you for your visit today.    If you have any questions or concerns please feel free to reach out of the office.       ==========================================================================  William Chavarria MD  Department of Endocrine, Diabetes and Metabolism  [facility], [place]  ==========================================================================   No